# Patient Record
Sex: FEMALE | Race: WHITE | Employment: OTHER | URBAN - NONMETROPOLITAN AREA
[De-identification: names, ages, dates, MRNs, and addresses within clinical notes are randomized per-mention and may not be internally consistent; named-entity substitution may affect disease eponyms.]

---

## 2021-03-21 NOTE — PROGRESS NOTES
[facility] from 7 AM to 5:30 PM                         Past Medical History:   Diagnosis Date    Hypertension        No past surgical history on file. No Known Allergies    Current Outpatient Medications   Medication Sig Dispense Refill    Multiple Vitamins-Minerals (MULTIVITAMIN ADULT PO) Take by mouth daily       No current facility-administered medications for this visit. Family History   Problem Relation Age of Onset    High Cholesterol Mother     Cancer Father     Heart Disease Father     Heart Disease Maternal Grandfather     High Blood Pressure Maternal Grandfather     Diabetes Paternal Grandfather     Heart Disease Paternal Grandfather     High Blood Pressure Paternal Grandfather         Review of Systems:   General/Constitutional: No recent loss of weight or appetite changes. No fever or chills. HENT: Negative. Eyes: Negative. Upper respiratory tract: No nasal stuffiness or post nasal drip. Lower respiratory tract/ lungs: No cough or sputum production. No hemoptysis. Cardiovascular: No palpitations or chest pain. Gastrointestinal: No nausea or vomiting. Neurological: No focal neurologiacal weakness. Extremities: No edema. Musculoskeletal: No complaints. Genitourinary: No complaints. Hematological: Negative. Psychiatric/Behavioral: Negative. Skin: No itching. /80 (Site: Left Lower Arm, Position: Sitting, Cuff Size: Large Adult)   Pulse 86   Temp 97.4 °F (36.3 °C) (Tympanic)   Ht 5' 5\" (1.651 m)   Wt (!) 407 lb 3.2 oz (184.7 kg)   SpO2 91% Comment: room air  BMI 67.76 kg/m²   Mallampati airway Class:4  Neck Circumference: 17. 50Inches  [place] sleepiness score 4/1/21: 3  Sleep Apnea Quality of Life Questionnaire 65      Physical Exam   Nursing note and vitals reviewed. Constitutional: Patient appears well built and well nourished. No distress. Patient is oriented to person, place, and time. HENT:   Head: Normocephalic and atraumatic.    Right Ear: External ear normal.   Left Ear: External ear normal.   Mouth/Throat: Oropharynx is clear and moist.  No oral thrush. Eyes: Conjunctivae are normal. Pupils are equal, round, and reactive to light. No scleral icterus. Neck: Neck supple. No JVD present. No tracheal deviation present. Cardiovascular: Normal rate, regular rhythm, normal heart sounds. No murmur heard. Pulmonary/Chest: Effort normal and breath sounds normal. No stridor. No respiratory distress. No wheezes. No rales. Patient exhibits no tenderness. Abdominal: Soft. Obese. Patient exhibits no distension. No tenderness. Musculoskeletal: Normal range of motion. Extremities: Patient exhibits no edema and no tenderness. Lymphadenopathy:  No cervical adenopathy. Neurological: Patient is alert and oriented to person, place, and time. Skin: Skin is warm and dry. Patient is not diaphoretic. Psychiatric: Patient  has a normal mood and affect. Patient behavior is normal.     Diagnostic Data:  None related sleep. Assessment:  -Snoring without witnessed apneas,frequent nocturnal awakenings and excessive daytime sleepiness to evaluate for obstructive sleep apnea. -Inadequate sleep hygiene.  -Pre operative evaluation for planned bariatric surgery.  -Polycystic ovarian syndrome. She used to be on treatment with Metformin in the past.  She quit taking Metformin      Recommendations/Plan:  -Will schedule patient for polysomnogram in the sleep lab. -I had a discussion with patient regarding avialable treatment options for her sleep disorder breathing including but not limited to CPAP titration in the sleep lab Vs.Dental appliance placement with referral to a local dentist Vs other available surgical options including Uvulopalatopharyngoplasty, maxillomandibular ostomy and tracheostomy as last option.  At the end of discussion, she is not decided on her   treatment if she found to have obstructive sleep apnea at this time.  -We will see Genoveva Johnson Shields back in 1week after the sleep study to go over the sleep study results and further management options.  -She was educated to practice good sleep hygiene practices. She was provided with a good sleep hygiene hand out.  -Abagail Kocher was advised to make earlier appointment with my clinic if she develops any worsening of sleep symptoms. She verbalizes understanding.  -Shewas advised to loose weight by controlling diet and doing exercise. She is currently following with [facility] weight management center with Dr. Dolores Winters was educated about my impression and plan. She verbalizes understanding.      -I personally reviewed updated the Past medical hx, Past surgical hx,Social hx, Family hx, Medications, Allergies in the discrete data section of the patient chart along with labs, Pulmonary medicine,Sleep medicine related, Pathological, Microbiological and Radiological investigations.

## 2021-04-01 ENCOUNTER — INITIAL CONSULT (OUTPATIENT)
Dept: PULMONOLOGY | Age: 36
End: 2021-04-01
Payer: COMMERCIAL

## 2021-04-01 VITALS
TEMPERATURE: 97.4 F | HEART RATE: 86 BPM | DIASTOLIC BLOOD PRESSURE: 80 MMHG | OXYGEN SATURATION: 91 % | WEIGHT: 293 LBS | HEIGHT: 65 IN | BODY MASS INDEX: 48.82 KG/M2 | SYSTOLIC BLOOD PRESSURE: 138 MMHG

## 2021-04-01 DIAGNOSIS — Z01.818 PRE-OP EVALUATION: ICD-10-CM

## 2021-04-01 DIAGNOSIS — G47.30 SLEEP APNEA, UNSPECIFIED TYPE: ICD-10-CM

## 2021-04-01 DIAGNOSIS — R06.83 SNORING: Primary | ICD-10-CM

## 2021-04-01 PROCEDURE — 99204 OFFICE O/P NEW MOD 45 MIN: CPT | Performed by: INTERNAL MEDICINE

## 2021-04-01 NOTE — PROGRESS NOTES
Chief Complaint: Consult,ref by Wayne No prior studies per patient    Mallampati airway Class:4  Neck Circumference: 17. 50Inches    [place] sleepiness score 4/1/21: 3  Sleep Apnea Quality of Life Questionnaire 65

## 2021-05-05 DIAGNOSIS — R06.83 SNORING: ICD-10-CM

## 2021-05-05 DIAGNOSIS — Z01.818 PRE-OP EVALUATION: ICD-10-CM

## 2021-05-05 DIAGNOSIS — G47.30 SLEEP APNEA, UNSPECIFIED TYPE: ICD-10-CM

## 2021-11-21 ENCOUNTER — APPOINTMENT (OUTPATIENT)
Dept: CT IMAGING | Age: 36
End: 2021-11-21
Payer: COMMERCIAL

## 2021-11-21 ENCOUNTER — HOSPITAL ENCOUNTER (EMERGENCY)
Age: 36
Discharge: ANOTHER HOSPITAL | End: 2021-11-22
Attending: EMERGENCY MEDICINE
Payer: COMMERCIAL

## 2021-11-21 ENCOUNTER — APPOINTMENT (OUTPATIENT)
Dept: GENERAL RADIOLOGY | Age: 36
End: 2021-11-21
Payer: COMMERCIAL

## 2021-11-21 DIAGNOSIS — J12.82 PNEUMONIA DUE TO COVID-19 VIRUS: Primary | ICD-10-CM

## 2021-11-21 DIAGNOSIS — R09.02 HYPOXIA: ICD-10-CM

## 2021-11-21 DIAGNOSIS — U07.1 PNEUMONIA DUE TO COVID-19 VIRUS: Primary | ICD-10-CM

## 2021-11-21 LAB
ALBUMIN SERPL-MCNC: 3.9 GM/DL (ref 3.4–5)
ALP BLD-CCNC: 96 IU/L (ref 40–129)
ALT SERPL-CCNC: 33 U/L (ref 10–40)
ANION GAP SERPL CALCULATED.3IONS-SCNC: 16 MMOL/L (ref 4–16)
AST SERPL-CCNC: 33 IU/L (ref 15–37)
BASOPHILS ABSOLUTE: 0 K/CU MM
BASOPHILS RELATIVE PERCENT: 0.4 % (ref 0–1)
BILIRUB SERPL-MCNC: 0.5 MG/DL (ref 0–1)
BUN BLDV-MCNC: 15 MG/DL (ref 6–23)
CALCIUM SERPL-MCNC: 8.2 MG/DL (ref 8.3–10.6)
CHLORIDE BLD-SCNC: 97 MMOL/L (ref 99–110)
CO2: 22 MMOL/L (ref 21–32)
CREAT SERPL-MCNC: 0.8 MG/DL (ref 0.6–1.1)
D DIMER: 767 NG/ML(DDU)
DIFFERENTIAL TYPE: ABNORMAL
EKG ATRIAL RATE: 86 BPM
EKG DIAGNOSIS: NORMAL
EKG P AXIS: 35 DEGREES
EKG P-R INTERVAL: 168 MS
EKG Q-T INTERVAL: 382 MS
EKG QRS DURATION: 108 MS
EKG QTC CALCULATION (BAZETT): 457 MS
EKG R AXIS: -3 DEGREES
EKG T AXIS: 23 DEGREES
EKG VENTRICULAR RATE: 86 BPM
EOSINOPHILS ABSOLUTE: 0 K/CU MM
EOSINOPHILS RELATIVE PERCENT: 0.1 % (ref 0–3)
GFR AFRICAN AMERICAN: >60 ML/MIN/1.73M2
GFR NON-AFRICAN AMERICAN: >60 ML/MIN/1.73M2
GLUCOSE BLD-MCNC: 109 MG/DL (ref 70–99)
HCG QUALITATIVE: NEGATIVE
HCT VFR BLD CALC: 50.7 % (ref 37–47)
HEMOGLOBIN: 15.9 GM/DL (ref 12.5–16)
IMMATURE NEUTROPHIL %: 0.8 % (ref 0–0.43)
LYMPHOCYTES ABSOLUTE: 2.5 K/CU MM
LYMPHOCYTES RELATIVE PERCENT: 31.1 % (ref 24–44)
MCH RBC QN AUTO: 28.5 PG (ref 27–31)
MCHC RBC AUTO-ENTMCNC: 31.4 % (ref 32–36)
MCV RBC AUTO: 91 FL (ref 78–100)
MONOCYTES ABSOLUTE: 0.5 K/CU MM
MONOCYTES RELATIVE PERCENT: 6.6 % (ref 0–4)
PDW BLD-RTO: 14.9 % (ref 11.7–14.9)
PLATELET # BLD: 233 K/CU MM (ref 140–440)
PMV BLD AUTO: 10 FL (ref 7.5–11.1)
POTASSIUM SERPL-SCNC: 3.9 MMOL/L (ref 3.5–5.1)
PRO-BNP: 73.91 PG/ML
RBC # BLD: 5.57 M/CU MM (ref 4.2–5.4)
SEGMENTED NEUTROPHILS ABSOLUTE COUNT: 4.8 K/CU MM
SEGMENTED NEUTROPHILS RELATIVE PERCENT: 61 % (ref 36–66)
SODIUM BLD-SCNC: 135 MMOL/L (ref 135–145)
TOTAL IMMATURE NEUTOROPHIL: 0.06 K/CU MM
TOTAL PROTEIN: 7.4 GM/DL (ref 6.4–8.2)
TROPONIN T: <0.01 NG/ML
WBC # BLD: 7.9 K/CU MM (ref 4–10.5)

## 2021-11-21 PROCEDURE — 87205 SMEAR GRAM STAIN: CPT

## 2021-11-21 PROCEDURE — 84703 CHORIONIC GONADOTROPIN ASSAY: CPT

## 2021-11-21 PROCEDURE — 85025 COMPLETE CBC W/AUTO DIFF WBC: CPT

## 2021-11-21 PROCEDURE — 6360000004 HC RX CONTRAST MEDICATION: Performed by: EMERGENCY MEDICINE

## 2021-11-21 PROCEDURE — 83615 LACTATE (LD) (LDH) ENZYME: CPT

## 2021-11-21 PROCEDURE — 2580000003 HC RX 258: Performed by: NURSE PRACTITIONER

## 2021-11-21 PROCEDURE — 86141 C-REACTIVE PROTEIN HS: CPT

## 2021-11-21 PROCEDURE — 71275 CT ANGIOGRAPHY CHEST: CPT

## 2021-11-21 PROCEDURE — 86140 C-REACTIVE PROTEIN: CPT

## 2021-11-21 PROCEDURE — 84484 ASSAY OF TROPONIN QUANT: CPT

## 2021-11-21 PROCEDURE — 87449 NOS EACH ORGANISM AG IA: CPT

## 2021-11-21 PROCEDURE — 87899 AGENT NOS ASSAY W/OPTIC: CPT

## 2021-11-21 PROCEDURE — 6360000002 HC RX W HCPCS: Performed by: EMERGENCY MEDICINE

## 2021-11-21 PROCEDURE — 6360000002 HC RX W HCPCS: Performed by: NURSE PRACTITIONER

## 2021-11-21 PROCEDURE — 85379 FIBRIN DEGRADATION QUANT: CPT

## 2021-11-21 PROCEDURE — 93005 ELECTROCARDIOGRAM TRACING: CPT | Performed by: EMERGENCY MEDICINE

## 2021-11-21 PROCEDURE — 80053 COMPREHEN METABOLIC PANEL: CPT

## 2021-11-21 PROCEDURE — 99285 EMERGENCY DEPT VISIT HI MDM: CPT

## 2021-11-21 PROCEDURE — 96375 TX/PRO/DX INJ NEW DRUG ADDON: CPT

## 2021-11-21 PROCEDURE — 83880 ASSAY OF NATRIURETIC PEPTIDE: CPT

## 2021-11-21 PROCEDURE — 71045 X-RAY EXAM CHEST 1 VIEW: CPT

## 2021-11-21 PROCEDURE — 96376 TX/PRO/DX INJ SAME DRUG ADON: CPT

## 2021-11-21 PROCEDURE — 93010 ELECTROCARDIOGRAM REPORT: CPT | Performed by: INTERNAL MEDICINE

## 2021-11-21 PROCEDURE — 82728 ASSAY OF FERRITIN: CPT

## 2021-11-21 PROCEDURE — 96367 TX/PROPH/DG ADDL SEQ IV INF: CPT

## 2021-11-21 PROCEDURE — 96365 THER/PROPH/DIAG IV INF INIT: CPT

## 2021-11-21 PROCEDURE — 96372 THER/PROPH/DIAG INJ SC/IM: CPT

## 2021-11-21 RX ORDER — DEXAMETHASONE SODIUM PHOSPHATE 10 MG/ML
10 INJECTION, SOLUTION INTRAMUSCULAR; INTRAVENOUS ONCE
Status: COMPLETED | OUTPATIENT
Start: 2021-11-21 | End: 2021-11-21

## 2021-11-21 RX ORDER — ACETAMINOPHEN 325 MG/1
650 TABLET ORAL EVERY 6 HOURS PRN
Status: DISCONTINUED | OUTPATIENT
Start: 2021-11-21 | End: 2021-11-22 | Stop reason: HOSPADM

## 2021-11-21 RX ORDER — ACETAMINOPHEN 650 MG/1
650 SUPPOSITORY RECTAL EVERY 6 HOURS PRN
Status: DISCONTINUED | OUTPATIENT
Start: 2021-11-21 | End: 2021-11-22 | Stop reason: HOSPADM

## 2021-11-21 RX ADMIN — AZITHROMYCIN 500 MG: 500 INJECTION, POWDER, LYOPHILIZED, FOR SOLUTION INTRAVENOUS at 18:19

## 2021-11-21 RX ADMIN — DEXAMETHASONE SODIUM PHOSPHATE 10 MG: 10 INJECTION, SOLUTION INTRAMUSCULAR; INTRAVENOUS at 14:36

## 2021-11-21 RX ADMIN — DEXAMETHASONE SODIUM PHOSPHATE 10 MG: 10 INJECTION, SOLUTION INTRAMUSCULAR; INTRAVENOUS at 17:25

## 2021-11-21 RX ADMIN — CEFTRIAXONE SODIUM 1000 MG: 1 INJECTION, POWDER, FOR SOLUTION INTRAMUSCULAR; INTRAVENOUS at 17:43

## 2021-11-21 RX ADMIN — IOPAMIDOL 100 ML: 755 INJECTION, SOLUTION INTRAVENOUS at 14:23

## 2021-11-21 ASSESSMENT — ENCOUNTER SYMPTOMS
EYE REDNESS: 0
RHINORRHEA: 0
BACK PAIN: 0
VOMITING: 0
ABDOMINAL PAIN: 0
SHORTNESS OF BREATH: 1
COUGH: 1
NAUSEA: 0
SORE THROAT: 0
WHEEZING: 0

## 2021-11-21 NOTE — CONSULTS
History   Problem Relation Age of Onset    High Cholesterol Mother      Cancer Father      Heart Disease Father      Heart Disease Maternal Grandfather      High Blood Pressure Maternal Grandfather      Diabetes Paternal Grandfather      Heart Disease Paternal Grandfather      High Blood Pressure Paternal Grandfather         Objective:   No intake or output data in the 24 hours ending 11/21/21 1540   Vitals:   Vitals:    11/21/21 1236   BP: (!) 145/84   Pulse: 99   Resp: 19   Temp: 99.4 °F (37.4 °C)   SpO2: 95%     Physical Exam:   GEN Awake female, sitting upright in bed in no apparent distress. Appears given age. EYES Pupils are equally round. No scleral erythema, discharge, or conjunctivitis. HENT Mucous membranes are moist. Oral pharynx without exudates, no evidence of thrush. NECK Supple, no apparent thyromegaly or masses. RESP diminished breath sounds bilaterally  CARDIO/VASC S1/S2 auscultated. Regular rate without appreciable murmurs, rubs, or gallops. No JVD or carotid bruits. Peripheral pulses equal bilaterally and palpable. No peripheral edema. GI Abdomen is soft without significant tenderness, masses, or guarding. Bowel sounds are normoactive.  No costovertebral angle tenderness  MSK No gross joint deformities. SKIN Normal coloration, warm, dry. NEURO Cranial nerves appear grossly intact, normal speech, no lateralizing weakness. PSYCH Awake, alert, oriented x 4. Affect appropriate.     Medications:   Medications:    dexamethasone  20 mg IntraVENous Daily      Infusions:   PRN Meds:        Electronically signed by TRICIA Pereira NP on 11/21/2021 at 3:40 PM

## 2021-11-21 NOTE — ED PROVIDER NOTES
Triage Chief Complaint:   Shortness of Breath (sent from urgent care today for low oxygen saturation. Symptoms started wednesday  with fever chills and SOB. )    Knik:  Faith Sharp is a 31 y.o. female that presents from urgent care after she tested positive for Covid there and was found to be hypoxic. Patient states her symptoms started on Wednesday with body aches and a headache and loss of taste and smell. She did have some coughing over the last 2 days and last night she had one episode of blood-tinged mucus. She felt short of breath with the cough. She has had occasional fevers. No chest pain. No nausea or vomiting. She denies any significant past medical history denies being on any medications daily. No history of blood clots. No lower extremity swelling. ROS:   Review of Systems   Constitutional: Positive for chills, fatigue and fever. HENT: Negative for congestion, rhinorrhea and sore throat. Eyes: Negative for redness and visual disturbance. Respiratory: Positive for cough and shortness of breath (with coughing). Negative for wheezing. Cardiovascular: Negative for chest pain and leg swelling. Gastrointestinal: Negative for abdominal pain, nausea and vomiting. Genitourinary: Negative for dysuria and frequency. Musculoskeletal: Positive for myalgias (generalized). Negative for arthralgias and back pain. Skin: Negative for rash and wound. Neurological: Positive for headaches. Negative for dizziness, syncope and light-headedness. Psychiatric/Behavioral: Negative. Negative for hallucinations and suicidal ideas. Past Medical History:   Diagnosis Date    Hypertension      No past surgical history on file.   Family History   Problem Relation Age of Onset    High Cholesterol Mother     Cancer Father     Heart Disease Father     Heart Disease Maternal Grandfather     High Blood Pressure Maternal Grandfather     Diabetes Paternal Grandfather     Heart Disease Paternal Grandfather     High Blood Pressure Paternal Grandfather      Social History     Socioeconomic History    Marital status:      Spouse name: Not on file    Number of children: Not on file    Years of education: Not on file    Highest education level: Not on file   Occupational History    Not on file   Tobacco Use    Smoking status: Former Smoker     Quit date: 2013     Years since quittin.4    Smokeless tobacco: Never Used   Substance and Sexual Activity    Alcohol use: No     Comment: ocassionally     Drug use: Not on file    Sexual activity: Not on file   Other Topics Concern    Not on file   Social History Narrative    Not on file     Social Determinants of Health     Financial Resource Strain:     Difficulty of Paying Living Expenses: Not on file   Food Insecurity:     Worried About [address] in the Last Year: Not on file    [address] [place] in the Last Year: Not on file   Transportation Needs:     Lack of Transportation (Medical): Not on file    Lack of Transportation (Non-Medical):  Not on file   Physical Activity:     Days of Exercise per Week: Not on file    Minutes of Exercise per Session: Not on file   Stress:     Feeling of Stress : Not on file   Social Connections:     Frequency of Communication with Friends and Family: Not on file    Frequency of Social Gatherings with Friends and Family: Not on file    Attends Anabaptism Services: Not on file    Active Member of [address] [place] [address] or Organizations: Not on file    Attends Club or Organization Meetings: Not on file    Marital Status: Not on file   Intimate Partner Violence:     Fear of Current or Ex-Partner: Not on file    Emotionally Abused: Not on file    Physically Abused: Not on file    Sexually Abused: Not on file   Housing Stability:     Unable to Pay for Housing in the Last Year: Not on file    Number of [place] in the Last Year: Not on file    Unstable Housing in the Last Year: Not on file     No current facility-administered medications for this encounter. No current outpatient medications on file.      Facility-Administered Medications Ordered in Other Encounters   Medication Dose Route Frequency Provider Last Rate Last Admin    sodium chloride flush 0.9 % injection 5-40 mL  5-40 mL IntraVENous 2 times per day Phoebe Rim, APRN - CNP   10 mL at 11/22/21 1229    sodium chloride flush 0.9 % injection 5-40 mL  5-40 mL IntraVENous PRN Phoebe Rim, APRN - CNP        0.9 % sodium chloride infusion  25 mL IntraVENous PRN Phoebe Rim, APRN - CNP        enoxaparin (LOVENOX) injection 30 mg  30 mg SubCUTAneous BID Phoebe Rim, APRN - CNP   30 mg at 11/22/21 1133    ondansetron (ZOFRAN-ODT) disintegrating tablet 4 mg  4 mg Oral Q8H PRN Phoebe Rim, APRN - CNP        Or    ondansetron (ZOFRAN) injection 4 mg  4 mg IntraVENous Q6H PRN Phoebe Rim, APRN - CNP        polyethylene glycol (GLYCOLAX) packet 17 g  17 g Oral Daily PRN Phoeeb Rim, APRN - CNP        acetaminophen (TYLENOL) tablet 650 mg  650 mg Oral Q6H PRN Phoebe Rim, APRN - CNP        Or    acetaminophen (TYLENOL) suppository 650 mg  650 mg Rectal Q6H PRN Phoebe Rim, APRN - CNP        ascorbic acid (VITAMIN C) tablet 500 mg  500 mg Oral Daily Phoebe Rim, APRN - CNP   500 mg at 11/22/21 1133    guaiFENesin-dextromethorphan (ROBITUSSIN DM) 100-10 MG/5ML syrup 5 mL  5 mL Oral Q4H PRN Phoebe Rim, APRN - CNP        dexamethasone (DECADRON) tablet 6 mg  6 mg Oral Daily Phoebe Rim, APRN - CNP   6 mg at 11/22/21 1133    vitamin D CAPS 2,000 Units  2,000 Units Oral Daily Phoebe Rim, APRN - CNP   2,000 Units at 11/22/21 1133    baricitinib (OLUMIANT) tablet 4 mg  4 mg Oral Daily Phoebe Rim, APRN - CNP   4 mg at 11/22/21 1228    albuterol sulfate  (90 Base) MCG/ACT inhaler 2 puff  2 puff Inhalation Q6H PRN Phoebe Rim, APRN - CNP         No Known Allergies    Nursing Notes Reviewed Pneumoniae Antigen    Specimen: CSF   Result Value Ref Range    Strep pneumo Ag URINE NEGATIVE    CBC Auto Differential   Result Value Ref Range    WBC 7.9 4.0 - 10.5 K/CU MM    RBC 5.57 (H) 4.2 - 5.4 M/CU MM    Hemoglobin 15.9 12.5 - 16.0 GM/DL    Hematocrit 50.7 (H) 37 - 47 %    MCV 91.0 78 - 100 FL    MCH 28.5 27 - 31 PG    MCHC 31.4 (L) 32.0 - 36.0 %    RDW 14.9 11.7 - 14.9 %    Platelets 918 228 - 940 K/CU MM    MPV 10.0 7.5 - 11.1 FL    Differential Type AUTOMATED DIFFERENTIAL     Segs Relative 61.0 36 - 66 %    Lymphocytes % 31.1 24 - 44 %    Monocytes % 6.6 (H) 0 - 4 %    Eosinophils % 0.1 0 - 3 %    Basophils % 0.4 0 - 1 %    Segs Absolute 4.8 K/CU MM    Lymphocytes Absolute 2.5 K/CU MM    Monocytes Absolute 0.5 K/CU MM    Eosinophils Absolute 0.0 K/CU MM    Basophils Absolute 0.0 K/CU MM    Immature Neutrophil % 0.8 (H) 0 - 0.43 %    Total Immature Neutrophil 0.06 K/CU MM   Comprehensive Metabolic Panel w/ Reflex to MG   Result Value Ref Range    Sodium 135 135 - 145 MMOL/L    Potassium 3.9 3.5 - 5.1 MMOL/L    Chloride 97 (L) 99 - 110 mMol/L    CO2 22 21 - 32 MMOL/L    BUN 15 6 - 23 MG/DL    CREATININE 0.8 0.6 - 1.1 MG/DL    Glucose 109 (H) 70 - 99 MG/DL    Calcium 8.2 (L) 8.3 - 10.6 MG/DL    Albumin 3.9 3.4 - 5.0 GM/DL    Total Protein 7.4 6.4 - 8.2 GM/DL    Total Bilirubin 0.5 0.0 - 1.0 MG/DL    ALT 33 10 - 40 U/L    AST 33 15 - 37 IU/L    Alkaline Phosphatase 96 40 - 129 IU/L    GFR Non-African American >60 >60 mL/min/1.73m2    GFR African American >60 >60 mL/min/1.73m2    Anion Gap 16 4 - 16   Troponin   Result Value Ref Range    Troponin T <0.010 <0.01 NG/ML   Brain Natriuretic Peptide   Result Value Ref Range    Pro-BNP 73.91 <300 PG/ML   HCG Serum, Qualitative   Result Value Ref Range    hCG Qual NEGATIVE    C-reactive protein   Result Value Ref Range    CRP, High Sensitivity 215.2 mg/L   Lactate dehydrogenase   Result Value Ref Range     (H) 120 - 246 IU/L   D-dimer, quantitative   Result Value Ref Range    D-Dimer, Quant 767 (H) <230 NG/mL(DDU)   High sensitivity CRP   Result Value Ref Range    CRP High Sensitivity 150.2 (H) <5.0 mg/L   Ferritin   Result Value Ref Range    Ferritin 347 (H) 15 - 150 NG/ML   EKG 12 Lead   Result Value Ref Range    Ventricular Rate 86 BPM    Atrial Rate 86 BPM    P-R Interval 168 ms    QRS Duration 108 ms    Q-T Interval 382 ms    QTc Calculation (Bazett) 457 ms    P Axis 35 degrees    R Axis -3 degrees    T Axis 23 degrees    Diagnosis       Normal sinus rhythm  Possible Left atrial enlargement  Incomplete right bundle branch block  Left ventricular hypertrophy  Abnormal ECG  No previous ECGs available  Confirmed by [facility] Ronal GARCÍA ([id number]) on 11/21/2021 6:35:05 PM        Radiographs (if obtained):  [] The following radiograph was interpreted by myself in the absence of a radiologist:  [x]Radiologist's Report Reviewed:  CTA PULMONARY W CONTRAST   Final Result   1. No pulmonary embolism. 2. Diffuse patchy bilateral consolidative opacities compatible with   multifocal pneumonia. 3. Enlarged main pulmonary artery as can be seen with pulmonary hypertension. XR CHEST PORTABLE   Final Result   Mild pulmonary edema. EKG (if obtained): (All EKG's are interpreted by myself in the absence of a cardiologist)  Normal sinus rhythm with a rate of 86. NC interval 168, , QTc 457. No ST elevations or depressions. Normal T waves. Incomplete right bundle branch block. Left ventricular hypertrophy. Impression: Abnormal EKG. No previous EKGs for comparison. MDM:  Differential diagnoses considered include but are not limited to COVID-19, Covid pneumonia, bacterial pneumonia, pulmonary edema, pulmonary embolism. Given patient's positive Covid test and hypoxia suspect her symptoms are due to COVID-19 pneumonia. Basic labs were obtained and are unremarkable. Chest x-ray shows mild pulmonary edema.   A CT scan of her chest was obtained which shows no pulmonary embolism but diffuse patchy bilateral consolidative opacities compatible with multifocal pneumonia. There is also an enlarged pulmonary artery which could be seen with pulmonary hypertension. Patient continues to require at least 2 L of oxygen at rest to maintain oxygen saturations above 90%. Due to this plan admitted to the hospital for further evaluation and treatment. Patient given a dose of Decadron in the emergency department. Patient will need to be transferred to [facility] given her positive Covid status. Plan of care explained to patient. All questions and concerns were addressed to the patient's satisfaction. Patient understood and agreed with plan. I spoke with Dr. Judi Herr who accepted pt in transfer. I did don appropriate PPE (including face mask, protective eye ware/safety glasses and gloves), as recommended by the health facility/national standard best practice, during my bedside interactions with the patient. The likelihood of other entities in the differential is insufficient to justify any further testing for them. This was explained to the patient. The patient was advised that persistent or worsening symptoms would requirefurther evaluation. Clinical Impression:  1. Pneumonia due to COVID-19 virus    2. Hypoxia          Kirk Stiles MD       Please note that portions of this note may have been complete with a voice recognition program.  Effortswere made to edit the dictations, but occasional words are mis-transcribed.   \       Kirk Stiles MD  11/22/21 4426

## 2021-11-21 NOTE — ED TRIAGE NOTES
Pt ambulatory into ED bed 5. Pt went to urgent care today and she was sent directly to [facility] ED. Pt sats were 72% on RA on arrival. Pt placed on 2 L NC upon arrival and sats are currently 96% at rest. Pt denies cp.

## 2021-11-22 ENCOUNTER — HOSPITAL ENCOUNTER (INPATIENT)
Age: 36
LOS: 5 days | Discharge: HOME | DRG: 177 | End: 2021-11-27
Attending: FAMILY MEDICINE | Admitting: FAMILY MEDICINE
Payer: COMMERCIAL

## 2021-11-22 VITALS
RESPIRATION RATE: 20 BRPM | DIASTOLIC BLOOD PRESSURE: 86 MMHG | HEART RATE: 85 BPM | OXYGEN SATURATION: 92 % | SYSTOLIC BLOOD PRESSURE: 135 MMHG | TEMPERATURE: 98.2 F

## 2021-11-22 LAB
APTT: 34.5 SECONDS (ref 25.1–37.1)
C-REACTIVE PROTEIN, HIGH SENSITIVITY: 150.2 MG/L
D DIMER: 720 NG/ML(DDU)
FERRITIN: 347 NG/ML (ref 15–150)
HIGH SENSITIVE C-REACTIVE PROTEIN: 148.1 MG/L
HIGH SENSITIVE C-REACTIVE PROTEIN: 215.2 MG/L
INR BLD: 1.03 INDEX
LACTATE DEHYDROGENASE: 313 IU/L (ref 120–246)
LACTATE DEHYDROGENASE: 341 IU/L (ref 120–246)
LEGIONELLA URINARY AG: NEGATIVE
PROCALCITONIN: 0.04
PROTHROMBIN TIME: 13.3 SECONDS (ref 11.7–14.5)
STREP PNEUMONIAE ANTIGEN: NORMAL
VITAMIN D 25-HYDROXY: 11.61 NG/ML

## 2021-11-22 PROCEDURE — 85730 THROMBOPLASTIN TIME PARTIAL: CPT

## 2021-11-22 PROCEDURE — 85379 FIBRIN DEGRADATION QUANT: CPT

## 2021-11-22 PROCEDURE — 2060000000 HC ICU INTERMEDIATE R&B

## 2021-11-22 PROCEDURE — 6370000000 HC RX 637 (ALT 250 FOR IP): Performed by: NURSE PRACTITIONER

## 2021-11-22 PROCEDURE — 1200000000 HC SEMI PRIVATE

## 2021-11-22 PROCEDURE — 84145 PROCALCITONIN (PCT): CPT

## 2021-11-22 PROCEDURE — 82306 VITAMIN D 25 HYDROXY: CPT

## 2021-11-22 PROCEDURE — 6360000002 HC RX W HCPCS: Performed by: NURSE PRACTITIONER

## 2021-11-22 PROCEDURE — 86141 C-REACTIVE PROTEIN HS: CPT

## 2021-11-22 PROCEDURE — 2700000000 HC OXYGEN THERAPY PER DAY

## 2021-11-22 PROCEDURE — 36415 COLL VENOUS BLD VENIPUNCTURE: CPT

## 2021-11-22 PROCEDURE — 94761 N-INVAS EAR/PLS OXIMETRY MLT: CPT

## 2021-11-22 PROCEDURE — 83615 LACTATE (LD) (LDH) ENZYME: CPT

## 2021-11-22 PROCEDURE — 85610 PROTHROMBIN TIME: CPT

## 2021-11-22 PROCEDURE — 2580000003 HC RX 258: Performed by: NURSE PRACTITIONER

## 2021-11-22 PROCEDURE — 94660 CPAP INITIATION&MGMT: CPT

## 2021-11-22 PROCEDURE — 80053 COMPREHEN METABOLIC PANEL: CPT

## 2021-11-22 RX ORDER — SODIUM CHLORIDE 0.9 % (FLUSH) 0.9 %
5-40 SYRINGE (ML) INJECTION PRN
Status: DISCONTINUED | OUTPATIENT
Start: 2021-11-22 | End: 2021-11-27 | Stop reason: HOSPADM

## 2021-11-22 RX ORDER — ACETAMINOPHEN 650 MG/1
650 SUPPOSITORY RECTAL EVERY 6 HOURS PRN
Status: DISCONTINUED | OUTPATIENT
Start: 2021-11-22 | End: 2021-11-27 | Stop reason: HOSPADM

## 2021-11-22 RX ORDER — DEXAMETHASONE 4 MG/1
6 TABLET ORAL DAILY
Status: DISCONTINUED | OUTPATIENT
Start: 2021-11-22 | End: 2021-11-27 | Stop reason: HOSPADM

## 2021-11-22 RX ORDER — ASCORBIC ACID 500 MG
500 TABLET ORAL DAILY
Status: DISCONTINUED | OUTPATIENT
Start: 2021-11-22 | End: 2021-11-27 | Stop reason: HOSPADM

## 2021-11-22 RX ORDER — ACETAMINOPHEN 325 MG/1
650 TABLET ORAL EVERY 6 HOURS PRN
Status: DISCONTINUED | OUTPATIENT
Start: 2021-11-22 | End: 2021-11-27 | Stop reason: HOSPADM

## 2021-11-22 RX ORDER — SODIUM CHLORIDE 0.9 % (FLUSH) 0.9 %
5-40 SYRINGE (ML) INJECTION EVERY 12 HOURS SCHEDULED
Status: DISCONTINUED | OUTPATIENT
Start: 2021-11-22 | End: 2021-11-27 | Stop reason: HOSPADM

## 2021-11-22 RX ORDER — POLYETHYLENE GLYCOL 3350 17 G/17G
17 POWDER, FOR SOLUTION ORAL DAILY PRN
Status: DISCONTINUED | OUTPATIENT
Start: 2021-11-22 | End: 2021-11-27 | Stop reason: HOSPADM

## 2021-11-22 RX ORDER — ALBUTEROL SULFATE 90 UG/1
2 AEROSOL, METERED RESPIRATORY (INHALATION) EVERY 6 HOURS PRN
Status: DISCONTINUED | OUTPATIENT
Start: 2021-11-22 | End: 2021-11-27 | Stop reason: HOSPADM

## 2021-11-22 RX ORDER — GUAIFENESIN/DEXTROMETHORPHAN 100-10MG/5
5 SYRUP ORAL EVERY 4 HOURS PRN
Status: DISCONTINUED | OUTPATIENT
Start: 2021-11-22 | End: 2021-11-27 | Stop reason: HOSPADM

## 2021-11-22 RX ORDER — ONDANSETRON 4 MG/1
4 TABLET, ORALLY DISINTEGRATING ORAL EVERY 8 HOURS PRN
Status: DISCONTINUED | OUTPATIENT
Start: 2021-11-22 | End: 2021-11-27 | Stop reason: HOSPADM

## 2021-11-22 RX ORDER — ONDANSETRON 2 MG/ML
4 INJECTION INTRAMUSCULAR; INTRAVENOUS EVERY 6 HOURS PRN
Status: DISCONTINUED | OUTPATIENT
Start: 2021-11-22 | End: 2021-11-27 | Stop reason: HOSPADM

## 2021-11-22 RX ORDER — SODIUM CHLORIDE 9 MG/ML
25 INJECTION, SOLUTION INTRAVENOUS PRN
Status: DISCONTINUED | OUTPATIENT
Start: 2021-11-22 | End: 2021-11-27 | Stop reason: HOSPADM

## 2021-11-22 RX ADMIN — DEXAMETHASONE 6 MG: 4 TABLET ORAL at 11:33

## 2021-11-22 RX ADMIN — OXYCODONE HYDROCHLORIDE AND ACETAMINOPHEN 500 MG: 500 TABLET ORAL at 11:33

## 2021-11-22 RX ADMIN — SODIUM CHLORIDE, PRESERVATIVE FREE 10 ML: 5 INJECTION INTRAVENOUS at 12:29

## 2021-11-22 RX ADMIN — Medication 2000 UNITS: at 11:33

## 2021-11-22 RX ADMIN — ENOXAPARIN SODIUM 30 MG: 100 INJECTION SUBCUTANEOUS at 00:56

## 2021-11-22 RX ADMIN — SODIUM CHLORIDE, PRESERVATIVE FREE 10 ML: 5 INJECTION INTRAVENOUS at 21:32

## 2021-11-22 RX ADMIN — ENOXAPARIN SODIUM 30 MG: 100 INJECTION SUBCUTANEOUS at 21:31

## 2021-11-22 RX ADMIN — ENOXAPARIN SODIUM 30 MG: 100 INJECTION SUBCUTANEOUS at 11:33

## 2021-11-22 RX ADMIN — BARICITINIB 4 MG: 2 TABLET, FILM COATED ORAL at 12:28

## 2021-11-22 ASSESSMENT — ENCOUNTER SYMPTOMS
VOMITING: 0
COUGH: 1
PHOTOPHOBIA: 0
SHORTNESS OF BREATH: 1
EYES NEGATIVE: 1
CHEST TIGHTNESS: 0
ABDOMINAL PAIN: 0
NAUSEA: 0
DIARRHEA: 0
SINUS PRESSURE: 0
WHEEZING: 0

## 2021-11-22 NOTE — ED NOTES
Frozen dinner, sherbert, blane mist and water served. States she has not eaten dinner. Sitting at the side of bed to eat. Enc activity as tolerated.        Kayleen Encinas RN  11/21/21 1584

## 2021-11-22 NOTE — ED NOTES
Transferred to [address] [place] [address] 2013 in stable condition by [facility].        Ashvin Araya RN  11/22/21 0208

## 2021-11-22 NOTE — ED NOTES
Patient placed on bipap on the following settings: IPAP 16 , EPAP 8, set RR 14 and 02 at 5lpm entrained to maintain Sp02 91-95%. VS HR 81, RR 17.  Patient uses bipap at home during sleep and related she is comfortable on mask. RN [employer] aware and RT follow.

## 2021-11-22 NOTE — CARE COORDINATION
.CM called pt's spouse to initiate a safe discharge plan. Cm introduced self and explained role of CM. Pt is kind, alert and oriented. Pt lives at home in a one floor home with spouse. Pt has a PCP. Pt has transportation for appointments and groceries. Pt does not have insurance but  is working with his employer to help. CM discussed Med Assist if needed. . CM called Otilia Vaz with the financial counselor to check also on insurance. Discharge plan is pending pts needs but at this time pt will return home with her . She is also loved and supported by her parents. Pt has a PCP. Poss need for Med assist. No insur listed. .  CM provided card and encouraged to call for any needs or concern. CM is available if any needs arise.

## 2021-11-22 NOTE — CONSULTS
Pharmacy Consult for Baricitinib Initiation per Trace Seth NP    Criteria per [facility] P&T Committee  **All criteria need to be met to receive   Baricitinib for COVID-19 patients**   Age    >5years old     Yes   Laboratory Results    Confirmed positive COVID-19     PLUS    ANY elevation in biomarkers   (CRP, D-dimer, LDH, ferritin)       Yes  D-Dimer   Concomitant Therapy    Receiving systemic steroids for treatment of COVID 19     Yes   Oxygen Status        Requiring supplemental oxygen   (>1 L NC, HFNC, Heated Vapotherm, biPAP, mechanical ventilation)        Yes   Special Considerations    Pregnancy  Severe Hepatic Impairment  Chronic/Recurrent Infections   Hemoglobin <8         Clarify risk vs. benefit with provider if criteria met     Contraindications    1. Invasive active mycobacterial or fungal infection  2. Lymphocyte count <200  3. Neutrophil count, ANC <500   4. Significant immunosuppression    ? None     Dosing Recommendations:    Baricitinib 4 mg PO daily x 14 days (or until hospital discharge)    Renal dose adjustment:  -eGFR > 60: no adjustment necessary       Thank you for the consult,  Supriya Santiago.  Kattie Kayser  11/22/2021 @ 11:27 AM

## 2021-11-22 NOTE — H&P
History and Physical      Name:  Eliseo Cabral /Age/Sex: 1985  (31 y.o. female)   MRN & CSN:  [id number] & [id number] Admission Date/Time: 2021  9:02 AM   Location:  6602/7327-F PCP: Jennifer Mojica Day: 1    Assessment and Plan:   Eliseo Cabral is a 31 y.o.  female  who presents with shortness of breath     Assessment and plan:     Acute respiratory failure with hypoxia 2/2 COVID viral pneumonia -CTA chest-no pulmonary embolism. Diffuse patchy bilateral consultive opacities compatible with multifocal pneumonia. Enlarged main pulmonary artery. In the Er patient was treated with Azithromycin and Rocephin. Decadron 20 mg IVPB. DDimer [phone],   -Admit to Med/Surg  -Pending screening labs  -Consult to Pharmacy for treatment plan  -Pending cultures  -Bronchodilators   -Pulmonary hygiene  - Decadron 6 mg daily  -Vitamin C 500 mg daily PO  -Vit D level  -Vitamin D daily PO  -respiratory per protocol  -Continuous pulse ox  -Strep pneumo. and legionella pending  -Robitussin DM    Obstructive sleep apnea: CPAP at night. CTA demonstrated pulmonary artery measurement of 4.5 cm seen in Pulmonary hypertension   -continue CPAP    Super Morbid obesity BMI: 65.45 kg/m2 Life style modifications    Disposition: Inpaitent. Patient was accepted for continue evaluation and treatment and improvement of clinical symptoms. Discussed assessment and treatment plan with the admitting and supervising physician who agrees with the plan. Diet ADULT DIET; Regular   DVT Prophylaxis [x] Lovenox, []  Heparin, [] SCDs, [] Warfarin  [] NOAC     GI Prophylaxis [] PPI,  [] H2 Blocker,  [] Carafate,  [] Diet/Tube Feeds   Code Status Full Code   ADM: mother Kate Pineda and Spouse Wilian     History of Present Illness:     Chief Complaint: shortness of breath, fever, chills, myalgias.    Eliseo Cabral is a 31 y.o.  female, with past medical history significant for sleep apnea, morbid obesity, who presented to the emergency room with complaint of fever, chills, shortness of breath, myalgias. The present condition started 5 days ago on Wednesday she reports she started with fever, chills, myalgias, cough at night states that dinner she had a change in her taste. Patient states she did not receive her Covid vaccine or a flu vaccine. 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 Patient denies any nausea, vomiting, abdominal pain, diarrhea. Denies any urinary symptoms, dysuria urgency or frequency. Patient denies any chest pain or palpitations. Patient states she does have a history of sleep apnea and uses CPAP at night. Patient initially presented urgent care and was found to be hypoxic with saturations 80-86% per what patient was told in  and was sent to the ER, her Covid test was positive. Upon arrival to ER O2 sats were 72%. Patient was seen at [facility] and accepted to transfer to [facility]. For further evaluation and treatment. Vital signs upon admission were temperature 97.8, blood pressure 122/103, respirations 19, pulse 79, 94% on high flow nasal cannula. The patient was brought to the ED and was subsequently admitted for  further evaluation. and management     Review of Systems   Constitutional: Positive for appetite change, chills and fever. Altered taste   HENT: Negative for congestion and sinus pressure. Eyes: Negative. Negative for photophobia and visual disturbance. Respiratory: Positive for cough and shortness of breath. Negative for chest tightness and wheezing. Cardiovascular: Negative for chest pain and palpitations. Gastrointestinal: Negative for abdominal pain, diarrhea, nausea and vomiting. Endocrine: Negative. Genitourinary: Negative for dysuria, frequency and urgency. Musculoskeletal: Positive for myalgias. Skin: Negative. Neurological: Positive for headaches.  Negative for weakness and light-headedness. Psychiatric/Behavioral: Negative. Objective:   No intake or output data in the 24 hours ending 11/22/21 1051   Vitals:   Vitals:    11/22/21 0903   BP: (!) 122/103   Pulse: 79   Resp: 19   Temp: 97.8 °F (36.6 °C)   SpO2: 94%     Physical Exam:   GEN- Awake female, NAD, sitting up in bed, appears to be stated age. EYES- Pupils are equally round. No scleral erythema, discharge, or conjunctivitis. HENT- Mucous membranes are moist. Oral pharynx without exudates, no evidence of thrush. NECK- Supple, no apparent thyromegaly or masses. RESP-Clear to auscultation, no wheezes, rales or rhonchi. Symmetric chest movement while on room air. CARDIO/VASC-  S1/S2 auscultated. Regular rate and rhythm, No JVD. Peripheral pulses equal bilaterally and palpable. GI- Abdomen is soft, obese, no tenderness, masses, or guarding. Bowel sounds present. MSK- No gross joint deformities. EXTREMITIES- pulses intact, no swelling, no calf tenderness  SKIN- Normal coloration, warm, dry. NEURO- Cranial nerves appear grossly intact, normal speech, no lateralizing weakness. PSYCH- Awake, alert, oriented x 4. Past Medical History:      Past Medical History:   Diagnosis Date    Hypertension      PSHX:  has no past surgical history on file. Allergies: No Known Allergies    FAM HX: family history includes Cancer in her father; Diabetes in her paternal grandfather; Heart Disease in her father, maternal grandfather, and paternal grandfather; High Blood Pressure in her maternal grandfather and paternal grandfather; High Cholesterol in her mother.   Soc HX:   Social History     Socioeconomic History    Marital status:      Spouse name: Not on file    Number of children: Not on file    Years of education: Not on file    Highest education level: Not on file   Occupational History    Not on file   Tobacco Use    Smoking status: Former Smoker     Quit date: 5/30/2013     Years since quittin.4    Smokeless tobacco: Never Used   Substance and Sexual Activity    Alcohol use: No     Comment: ocassionally     Drug use: Not on file    Sexual activity: Not on file   Other Topics Concern    Not on file   Social History Narrative    Not on file     Social and family history reviewed with patient. Medications:     Home Medication   Prior to Admission medications    Medication Sig Start Date End Date Taking? Authorizing Provider   Multiple Vitamins-Minerals (MULTIVITAMIN ADULT PO) Take by mouth daily    Historical Provider, MD     Medications:    sodium chloride flush  5-40 mL IntraVENous 2 times per day    enoxaparin  30 mg SubCUTAneous BID    ascorbic acid  500 mg Oral Daily    dexamethasone  6 mg Oral Daily    Vitamin D  2,000 Units Oral Daily      Infusions:    sodium chloride       PRN Meds: sodium chloride flush, 5-40 mL, PRN  sodium chloride, 25 mL, PRN  ondansetron, 4 mg, Q8H PRN   Or  ondansetron, 4 mg, Q6H PRN  polyethylene glycol, 17 g, Daily PRN  acetaminophen, 650 mg, Q6H PRN   Or  acetaminophen, 650 mg, Q6H PRN  guaiFENesin-dextromethorphan, 5 mL, Q4H PRN        Recent Labs     21  1230   WBC 7.9   HGB 15.9   HCT 50.7*         Recent Labs     21  1230      K 3.9   CL 97*   CO2 22   BUN 15   CREATININE 0.8     Recent Labs     21  1230   AST 33   ALT 33   BILITOT 0.5   ALKPHOS 96     No results for input(s): INR in the last 72 hours. Recent Labs     21  1230   TROPONINT <0.010        Imaging reviewed  XR CHEST PORTABLE    Result Date: 2021  EXAMINATION: ONE XRAY VIEW OF THE CHEST 2021 12:33 pm COMPARISON: None. HISTORY: ORDERING SYSTEM PROVIDED HISTORY: shortness of breath TECHNOLOGIST PROVIDED HISTORY: Reason for exam:->shortness of breath Reason for Exam: Shortness of breath FINDINGS: Cardiomediastinal silhouette is within normal limits. Bilateral pulmonary vascular congestion and mild interstitial edema.   No pleural effusion or pneumothorax. No gross bony abnormality. Mild pulmonary edema. CTA PULMONARY W CONTRAST    Result Date: 11/21/2021  EXAMINATION: CTA OF THE CHEST 11/21/2021 1:56 pm TECHNIQUE: CTA of the chest was performed after the administration of intravenous contrast.  Multiplanar reformatted images are provided for review. MIP images are provided for review. Dose modulation, iterative reconstruction, and/or weight based adjustment of the mA/kV was utilized to reduce the radiation dose to as low as reasonably achievable. COMPARISON: Chest radiograph 11/21/2021. HISTORY: ORDERING SYSTEM PROVIDED HISTORY: hypoxia TECHNOLOGIST PROVIDED HISTORY: Reason for exam:->hypoxia Decision Support Exception - unselect if not a suspected or confirmed emergency medical condition->Emergency Medical Condition (MA) Reason for Exam: Hypoxia FINDINGS: Pulmonary Arteries: The main pulmonary artery is enlarged measuring up to approximately 4.5 cm in diameter. The pulmonary arteries are adequately opacified for evaluation. Limited evaluation of the subsegmental pulmonary artery branches secondary to the patient's body habitus. No pulmonary embolism identified. Mediastinum: The thoracic aorta is normal in appearance. The coronary arteries and branch vessels of the superior mediastinum and lower neck are unremarkable. The heart is upper limits of normal in size. No pericardial effusion. The mediastinal esophagus and visualized aspects of the thyroid gland are unremarkable. No mediastinal or hilar lymphadenopathy. Lungs/pleura: The central airways are patent. No pleural effusion or pneumothorax. There are diffuse patchy bilateral consolidative opacities most pronounced in the upper lobes. No interlobular septal thickening. Upper Abdomen: Limited images of the upper abdomen are unremarkable. Soft Tissues/Bones: No acute bone or soft tissue abnormality. 1. No pulmonary embolism.  2. Diffuse patchy bilateral consolidative opacities compatible with multifocal pneumonia. 3. Enlarged main pulmonary artery as can be seen with pulmonary hypertension.           Electronically signed by TRICIA Long CNP on 11/22/2021 at 10:51 AM

## 2021-11-22 NOTE — ED NOTES
Report given to [facility].       Felix Mike RN  11/22/21 [address] Siddhartha Maria M Aguillon RN  11/22/21 6416

## 2021-11-22 NOTE — ED NOTES
Symptoms started Wednesday (4 days ago). Was sick last month with with URI but tested Negative for covid then. Today is the first covid test she has received since Wednesday.  + HA, body aches, fever, can't smell or taste anything. SOB started last night with cough. Productive cough and states she has had blood in it. Typically sleeps in a recliner. Has been able to walk to BR at home. Has increased fatigue, slept for 2 days straight.        Reji Ozuna, GONZALES  11/21/21 2041

## 2021-11-22 NOTE — ED NOTES
Bipap changed to V-60 and settings adjusted to IPAP 17, EPAP 8 , set RR 15 and Fi02 55%  to maintain Sp02 in the 90's. VS HR 64, RR17 and Sp02 98%. Noted patient does have apneas during sleep and she does use bipap at home with 02 entrained. Patient relates she is currently comfortable and respirations are non-labored. RN aware and RT will follow.

## 2021-11-22 NOTE — ED NOTES
Sitting at the side of bed drinking fluids. Diaphoretic and states she has been that way since she got here.        Ashley Chery RN  11/21/21 3999

## 2021-11-23 LAB
ALBUMIN SERPL-MCNC: 3.9 GM/DL (ref 3.4–5)
ALP BLD-CCNC: 85 IU/L (ref 40–128)
ALT SERPL-CCNC: 34 U/L (ref 10–40)
ANION GAP SERPL CALCULATED.3IONS-SCNC: 11 MMOL/L (ref 4–16)
AST SERPL-CCNC: 39 IU/L (ref 15–37)
BASOPHILS ABSOLUTE: 0 K/CU MM
BASOPHILS RELATIVE PERCENT: 0.1 % (ref 0–1)
BILIRUB SERPL-MCNC: 0.5 MG/DL (ref 0–1)
BUN BLDV-MCNC: 23 MG/DL (ref 6–23)
CALCIUM SERPL-MCNC: 8.9 MG/DL (ref 8.3–10.6)
CHLORIDE BLD-SCNC: 101 MMOL/L (ref 99–110)
CO2: 29 MMOL/L (ref 21–32)
CREAT SERPL-MCNC: 0.7 MG/DL (ref 0.6–1.1)
D DIMER: 632 NG/ML(DDU)
DIFFERENTIAL TYPE: ABNORMAL
EOSINOPHILS ABSOLUTE: 0 K/CU MM
EOSINOPHILS RELATIVE PERCENT: 0 % (ref 0–3)
GFR AFRICAN AMERICAN: >60 ML/MIN/1.73M2
GFR NON-AFRICAN AMERICAN: >60 ML/MIN/1.73M2
GLUCOSE BLD-MCNC: 192 MG/DL (ref 70–99)
HCT VFR BLD CALC: 49.8 % (ref 37–47)
HEMOGLOBIN: 15.2 GM/DL (ref 12.5–16)
HIGH SENSITIVE C-REACTIVE PROTEIN: 63.4 MG/L
IMMATURE NEUTROPHIL %: 0.7 % (ref 0–0.43)
LACTATE: 1.8 MMOL/L (ref 0.4–2)
LYMPHOCYTES ABSOLUTE: 1.5 K/CU MM
LYMPHOCYTES RELATIVE PERCENT: 15 % (ref 24–44)
MCH RBC QN AUTO: 28.5 PG (ref 27–31)
MCHC RBC AUTO-ENTMCNC: 30.5 % (ref 32–36)
MCV RBC AUTO: 93.4 FL (ref 78–100)
MONOCYTES ABSOLUTE: 0.6 K/CU MM
MONOCYTES RELATIVE PERCENT: 5.3 % (ref 0–4)
NUCLEATED RBC %: 0 %
PDW BLD-RTO: 14.6 % (ref 11.7–14.9)
PLATELET # BLD: 289 K/CU MM (ref 140–440)
PMV BLD AUTO: 9.7 FL (ref 7.5–11.1)
POTASSIUM SERPL-SCNC: 4.4 MMOL/L (ref 3.5–5.1)
RBC # BLD: 5.33 M/CU MM (ref 4.2–5.4)
SEGMENTED NEUTROPHILS ABSOLUTE COUNT: 8.1 K/CU MM
SEGMENTED NEUTROPHILS RELATIVE PERCENT: 78.9 % (ref 36–66)
SODIUM BLD-SCNC: 141 MMOL/L (ref 135–145)
TOTAL IMMATURE NEUTOROPHIL: 0.07 K/CU MM
TOTAL NUCLEATED RBC: 0 K/CU MM
TOTAL PROTEIN: 7.2 GM/DL (ref 6.4–8.2)
TROPONIN T: <0.01 NG/ML
WBC # BLD: 10.3 K/CU MM (ref 4–10.5)

## 2021-11-23 PROCEDURE — 6370000000 HC RX 637 (ALT 250 FOR IP): Performed by: FAMILY MEDICINE

## 2021-11-23 PROCEDURE — XW0DXM6 INTRODUCTION OF BARICITINIB INTO MOUTH AND PHARYNX, EXTERNAL APPROACH, NEW TECHNOLOGY GROUP 6: ICD-10-PCS | Performed by: FAMILY MEDICINE

## 2021-11-23 PROCEDURE — 84484 ASSAY OF TROPONIN QUANT: CPT

## 2021-11-23 PROCEDURE — 2060000000 HC ICU INTERMEDIATE R&B

## 2021-11-23 PROCEDURE — 36415 COLL VENOUS BLD VENIPUNCTURE: CPT

## 2021-11-23 PROCEDURE — 80053 COMPREHEN METABOLIC PANEL: CPT

## 2021-11-23 PROCEDURE — 2580000003 HC RX 258: Performed by: NURSE PRACTITIONER

## 2021-11-23 PROCEDURE — 1200000000 HC SEMI PRIVATE

## 2021-11-23 PROCEDURE — 83605 ASSAY OF LACTIC ACID: CPT

## 2021-11-23 PROCEDURE — 86141 C-REACTIVE PROTEIN HS: CPT

## 2021-11-23 PROCEDURE — 6370000000 HC RX 637 (ALT 250 FOR IP): Performed by: NURSE PRACTITIONER

## 2021-11-23 PROCEDURE — 94761 N-INVAS EAR/PLS OXIMETRY MLT: CPT

## 2021-11-23 PROCEDURE — 85379 FIBRIN DEGRADATION QUANT: CPT

## 2021-11-23 PROCEDURE — 6360000002 HC RX W HCPCS: Performed by: NURSE PRACTITIONER

## 2021-11-23 PROCEDURE — 2700000000 HC OXYGEN THERAPY PER DAY

## 2021-11-23 PROCEDURE — 85025 COMPLETE CBC W/AUTO DIFF WBC: CPT

## 2021-11-23 RX ADMIN — SODIUM CHLORIDE, PRESERVATIVE FREE 10 ML: 5 INJECTION INTRAVENOUS at 08:57

## 2021-11-23 RX ADMIN — SODIUM CHLORIDE, PRESERVATIVE FREE 10 ML: 5 INJECTION INTRAVENOUS at 21:49

## 2021-11-23 RX ADMIN — BARICITINIB 4 MG: 2 TABLET, FILM COATED ORAL at 08:57

## 2021-11-23 RX ADMIN — DEXAMETHASONE 6 MG: 4 TABLET ORAL at 08:57

## 2021-11-23 RX ADMIN — Medication 2000 UNITS: at 08:56

## 2021-11-23 RX ADMIN — ENOXAPARIN SODIUM 30 MG: 100 INJECTION SUBCUTANEOUS at 21:48

## 2021-11-23 RX ADMIN — OXYCODONE HYDROCHLORIDE AND ACETAMINOPHEN 500 MG: 500 TABLET ORAL at 08:56

## 2021-11-23 RX ADMIN — ENOXAPARIN SODIUM 30 MG: 100 INJECTION SUBCUTANEOUS at 08:57

## 2021-11-23 NOTE — PROGRESS NOTES
Hospitalist Progress Note      Name:  Silvana Izaguirre /Age/Sex: 1985  (31 y.o. female)   MRN & CSN:  [id number] & [id number] Admission Date/Time: 2021  9:02 AM   Location:  [address] [place] [address]-A PCP: Jodee Vigil Day: 2    Assessment and Plan:   Silvana Izaguirre is a 31 y.o.  female  who presents with <principal problem not specified>    1. PNEUMONIA DUE TO COVID-19  -Currently stable, on Olumiant/dexa day 2. Will continue for now    2. ACUTE RESPIRATORY FAILURE WITH HYPOXIA  -sec to above. Still on 7L will continue and wean as tolerated    3. OBSTRUCTIVE SLEEP APNEA  -Stable, slept last night. Based on what patient described she is likely on BiPAP at home. Suggested she could have home machine brought in but is currently stable without it    4. ENLARGED PULMONARY ARTERY  -Incidental finding on CT. I explained it is likely secondary to TENNILLE and obesity and suggested she can follow up with PCP as an outpatient to continue to follow    5. MORBID OBESITY      Diet ADULT DIET; Regular   DVT Prophylaxis [x] Lovenox, []  Heparin, [] SCDs, [] Ambulation   GI Prophylaxis [] PPI,  [] H2 Blocker,  [] Carafate,  [] Diet/Tube Feeds   Code Status Full Code   Disposition Patient requires continued admission due to covid   MDM [] Low, [] Moderate,[]  High  Patient's risk as above due to covid     History of Present Illness:     Chief Complaint: <principal problem not specified>  Silvana Izaguirre is a 31 y.o.  female  who presents with SOB    No reported issues overnight. She was able to sleep well last night. No SOB or other complaints. Appetite appropriate. No other complaints       14 point ROS reviewed negative, unless as noted above    Objective:        Intake/Output Summary (Last 24 hours) at 2021 1201  Last data filed at 2021 2119  Gross per 24 hour   Intake 120 ml   Output --   Net 120 ml      Vitals:   Vitals:    21 0855   BP:    Pulse:    Resp:    Temp: 97.4 °F (36.3 °C)   SpO2: Physical Exam:   GEN Awake female, sitting on side of bed in no apparent distress. Appears given age. EYES Pupils are equally round. No scleral erythema, discharge, or conjunctivitis. HENT Mucous membranes are moist. Oral pharynx without exudates, no evidence of thrush. NECK Supple, no apparent thyromegaly or masses. RESP Clear to auscultation, no wheezes, rales or rhonchi. Symmetric chest movement. CARDIO/VASC S1/S2 auscultated. Regular rate without appreciable murmurs, rubs, or gallops. No JVD or carotid bruits. Peripheral pulses equal bilaterally and palpable. No peripheral edema. GI Abdomen is soft without significant tenderness, masses, or guarding. Bowel sounds are normoactive. Rectal exam deferred. HEME/LYMPH No palpable cervical lymphadenopathy and no hepatosplenomegaly. No petechiae or ecchymoses. MSK No gross joint deformities. SKIN Normal coloration, warm, dry. NEURO Cranial nerves appear grossly intact, normal speech, no lateralizing weakness. PSYCH Awake, alert, oriented x 4. Affect appropriate.     Medications:   Medications:    sodium chloride flush  5-40 mL IntraVENous 2 times per day    enoxaparin  30 mg SubCUTAneous BID    ascorbic acid  500 mg Oral Daily    dexamethasone  6 mg Oral Daily    vitamin D  2,000 Units Oral Daily    baricitinib  4 mg Oral Daily      Infusions:    sodium chloride       PRN Meds: sodium chloride flush, 5-40 mL, PRN  sodium chloride, 25 mL, PRN  ondansetron, 4 mg, Q8H PRN   Or  ondansetron, 4 mg, Q6H PRN  polyethylene glycol, 17 g, Daily PRN  acetaminophen, 650 mg, Q6H PRN   Or  acetaminophen, 650 mg, Q6H PRN  guaiFENesin-dextromethorphan, 5 mL, Q4H PRN  albuterol sulfate HFA, 2 puff, Q6H PRN

## 2021-11-23 NOTE — CARE COORDINATION
Reviewing pt's chart and noticed pt does not have Insurance and is positive for COVID. Per Ashley Desai CM at [facility], \"Pt does not have insurance but  is working with his employer to help. CM discussed Med Assist if needed. . CM called Fanny Keith with the financial counselor to check also on insurance. Poss need for Med Assist.\" Pt is being added to our referral list if she would need assistance. If a voucher is created at d/c, she will then be put on the flag list. If she would require any further assistance, she would have to fill out an application and required documents. Noni Epstein

## 2021-11-24 LAB
ALBUMIN SERPL-MCNC: 3.6 GM/DL (ref 3.4–5)
ALP BLD-CCNC: 71 IU/L (ref 40–128)
ALT SERPL-CCNC: 53 U/L (ref 10–40)
ANION GAP SERPL CALCULATED.3IONS-SCNC: 15 MMOL/L (ref 4–16)
AST SERPL-CCNC: 50 IU/L (ref 15–37)
BASOPHILS ABSOLUTE: 0 K/CU MM
BASOPHILS RELATIVE PERCENT: 0.1 % (ref 0–1)
BILIRUB SERPL-MCNC: 0.5 MG/DL (ref 0–1)
BUN BLDV-MCNC: 23 MG/DL (ref 6–23)
CALCIUM SERPL-MCNC: 8.4 MG/DL (ref 8.3–10.6)
CHLORIDE BLD-SCNC: 103 MMOL/L (ref 99–110)
CO2: 26 MMOL/L (ref 21–32)
CREAT SERPL-MCNC: 0.6 MG/DL (ref 0.6–1.1)
D DIMER: 617 NG/ML(DDU)
DIFFERENTIAL TYPE: ABNORMAL
EOSINOPHILS ABSOLUTE: 0 K/CU MM
EOSINOPHILS RELATIVE PERCENT: 0 % (ref 0–3)
GFR AFRICAN AMERICAN: >60 ML/MIN/1.73M2
GFR NON-AFRICAN AMERICAN: >60 ML/MIN/1.73M2
GLUCOSE BLD-MCNC: 233 MG/DL (ref 70–99)
HCT VFR BLD CALC: 46.7 % (ref 37–47)
HEMOGLOBIN: 14.3 GM/DL (ref 12.5–16)
HIGH SENSITIVE C-REACTIVE PROTEIN: 38.1 MG/L
IMMATURE NEUTROPHIL %: 0.9 % (ref 0–0.43)
LYMPHOCYTES ABSOLUTE: 2 K/CU MM
LYMPHOCYTES RELATIVE PERCENT: 25.1 % (ref 24–44)
MCH RBC QN AUTO: 28.7 PG (ref 27–31)
MCHC RBC AUTO-ENTMCNC: 30.6 % (ref 32–36)
MCV RBC AUTO: 93.8 FL (ref 78–100)
MONOCYTES ABSOLUTE: 0.4 K/CU MM
MONOCYTES RELATIVE PERCENT: 4.7 % (ref 0–4)
NUCLEATED RBC %: 0 %
PDW BLD-RTO: 14.7 % (ref 11.7–14.9)
PLATELET # BLD: 260 K/CU MM (ref 140–440)
PMV BLD AUTO: 9.3 FL (ref 7.5–11.1)
POTASSIUM SERPL-SCNC: 4 MMOL/L (ref 3.5–5.1)
RBC # BLD: 4.98 M/CU MM (ref 4.2–5.4)
SEGMENTED NEUTROPHILS ABSOLUTE COUNT: 5.4 K/CU MM
SEGMENTED NEUTROPHILS RELATIVE PERCENT: 69.2 % (ref 36–66)
SODIUM BLD-SCNC: 144 MMOL/L (ref 135–145)
TOTAL IMMATURE NEUTOROPHIL: 0.07 K/CU MM
TOTAL NUCLEATED RBC: 0 K/CU MM
TOTAL PROTEIN: 6.3 GM/DL (ref 6.4–8.2)
WBC # BLD: 7.8 K/CU MM (ref 4–10.5)

## 2021-11-24 PROCEDURE — 6370000000 HC RX 637 (ALT 250 FOR IP): Performed by: NURSE PRACTITIONER

## 2021-11-24 PROCEDURE — 36415 COLL VENOUS BLD VENIPUNCTURE: CPT

## 2021-11-24 PROCEDURE — 94761 N-INVAS EAR/PLS OXIMETRY MLT: CPT

## 2021-11-24 PROCEDURE — 86141 C-REACTIVE PROTEIN HS: CPT

## 2021-11-24 PROCEDURE — 2700000000 HC OXYGEN THERAPY PER DAY

## 2021-11-24 PROCEDURE — 85379 FIBRIN DEGRADATION QUANT: CPT

## 2021-11-24 PROCEDURE — 80053 COMPREHEN METABOLIC PANEL: CPT

## 2021-11-24 PROCEDURE — 2580000003 HC RX 258: Performed by: NURSE PRACTITIONER

## 2021-11-24 PROCEDURE — 6370000000 HC RX 637 (ALT 250 FOR IP): Performed by: FAMILY MEDICINE

## 2021-11-24 PROCEDURE — 85025 COMPLETE CBC W/AUTO DIFF WBC: CPT

## 2021-11-24 PROCEDURE — 1200000000 HC SEMI PRIVATE

## 2021-11-24 PROCEDURE — 84145 PROCALCITONIN (PCT): CPT

## 2021-11-24 PROCEDURE — 6360000002 HC RX W HCPCS: Performed by: NURSE PRACTITIONER

## 2021-11-24 RX ADMIN — BARICITINIB 4 MG: 2 TABLET, FILM COATED ORAL at 08:20

## 2021-11-24 RX ADMIN — Medication 2000 UNITS: at 08:21

## 2021-11-24 RX ADMIN — DEXAMETHASONE 6 MG: 4 TABLET ORAL at 08:21

## 2021-11-24 RX ADMIN — ENOXAPARIN SODIUM 30 MG: 100 INJECTION SUBCUTANEOUS at 21:39

## 2021-11-24 RX ADMIN — ENOXAPARIN SODIUM 30 MG: 100 INJECTION SUBCUTANEOUS at 08:22

## 2021-11-24 RX ADMIN — OXYCODONE HYDROCHLORIDE AND ACETAMINOPHEN 500 MG: 500 TABLET ORAL at 08:21

## 2021-11-24 RX ADMIN — SODIUM CHLORIDE, PRESERVATIVE FREE 10 ML: 5 INJECTION INTRAVENOUS at 08:22

## 2021-11-24 RX ADMIN — SODIUM CHLORIDE, PRESERVATIVE FREE 10 ML: 5 INJECTION INTRAVENOUS at 21:39

## 2021-11-24 NOTE — FLOWSHEET NOTE
Initial pastoral care visit. Patient was hoping to be home on thanksgiving and realize that is probably not going to happen. Patient was sad and little tearful during the visit. This  offered active listening, emotional support through encouragements and prayer support. Patient appreciated the visit.

## 2021-11-24 NOTE — PROGRESS NOTES
Hospitalist Progress Note      Name:  Eric Damon /Age/Sex: 1985  (31 y.o. female)   MRN & CSN:  [id number] & [id number] Admission Date/Time: 2021  9:02 AM   Location:  [address] [place] [address]-H PCP: Lilly Quinn Day: 3    Assessment and Plan:   Eric Damon is a 31 y.o.  female  who presents with <principal problem not specified>    1. PNEUMONIA DUE TO COVID-19  -Currently stable, she is feeling better. On Olumiant/dexa day 2. Will continue for now     2. ACUTE RESPIRATORY FAILURE WITH HYPOXIA  -sec to above. Currently on 6L will continue and wean as tolerated     3. OBSTRUCTIVE SLEEP APNEA  -Stable, slept last night. I encouraged patient to have her  bring in machine     4. ENLARGED PULMONARY ARTERY  -Incidental finding on CT. Will follow up with PCP    5. MORBID OBESITY          Diet ADULT DIET; Regular   DVT Prophylaxis [x] Lovenox, []  Heparin, [] SCDs, [] Ambulation   GI Prophylaxis [] PPI,  [] H2 Blocker,  [] Carafate,  [] Diet/Tube Feeds   Code Status Full Code   Disposition Patient requires continued admission due to covid   MDM [] Low, [] Moderate,[]  High  Patient's risk as above due to covid     History of Present Illness:     Chief Complaint: <principal problem not specified>  Eric Damon is a 31 y.o.  female  who presents with SOB    No issues overnight. She is feeling better today. Was able to sleep and is going to toilet without issues       14 point ROS reviewed negative, unless as noted above    Objective:   No intake or output data in the 24 hours ending 21 1357   Vitals:   Vitals:    21 0811   BP:    Pulse:    Resp:    Temp:    SpO2: 93%     Physical Exam:   GEN Awake female, sitting upright side of bed in no apparent distress. Appears given age. EYES Pupils are equally round. No scleral erythema, discharge, or conjunctivitis. HENT Mucous membranes are moist. Oral pharynx without exudates, no evidence of thrush.   NECK Supple, no apparent thyromegaly or masses. RESP Clear to auscultation, no wheezes, rales or rhonchi. Symmetric chest movement . CARDIO/VASC S1/S2 auscultated. Regular rate without appreciable murmurs, rubs, or gallops. No JVD or carotid bruits. Peripheral pulses equal bilaterally and palpable. No peripheral edema. GI Abdomen is soft without significant tenderness, masses, or guarding. Bowel sounds are normoactive. Rectal exam deferred. HEME/LYMPH No palpable cervical lymphadenopathy and no hepatosplenomegaly. No petechiae or ecchymoses. MSK No gross joint deformities. SKIN Normal coloration, warm, dry. NEURO Cranial nerves appear grossly intact, normal speech, no lateralizing weakness. PSYCH Awake, alert, oriented x 4. Affect appropriate.     Medications:   Medications:    sodium chloride flush  5-40 mL IntraVENous 2 times per day    enoxaparin  30 mg SubCUTAneous BID    ascorbic acid  500 mg Oral Daily    dexamethasone  6 mg Oral Daily    vitamin D  2,000 Units Oral Daily    baricitinib  4 mg Oral Daily      Infusions:    sodium chloride       PRN Meds: sodium chloride flush, 5-40 mL, PRN  sodium chloride, 25 mL, PRN  ondansetron, 4 mg, Q8H PRN   Or  ondansetron, 4 mg, Q6H PRN  polyethylene glycol, 17 g, Daily PRN  acetaminophen, 650 mg, Q6H PRN   Or  acetaminophen, 650 mg, Q6H PRN  guaiFENesin-dextromethorphan, 5 mL, Q4H PRN  albuterol sulfate HFA, 2 puff, Q6H PRN

## 2021-11-25 LAB
ESTIMATED AVERAGE GLUCOSE: 143 MG/DL
GLUCOSE BLD-MCNC: 102 MG/DL (ref 70–99)
GLUCOSE BLD-MCNC: 119 MG/DL (ref 70–99)
GLUCOSE BLD-MCNC: 123 MG/DL (ref 70–99)
GLUCOSE BLD-MCNC: 99 MG/DL (ref 70–99)
HBA1C MFR BLD: 6.6 % (ref 4.2–6.3)
PROCALCITONIN: 0.02

## 2021-11-25 PROCEDURE — 2700000000 HC OXYGEN THERAPY PER DAY

## 2021-11-25 PROCEDURE — 1200000000 HC SEMI PRIVATE

## 2021-11-25 PROCEDURE — 82962 GLUCOSE BLOOD TEST: CPT

## 2021-11-25 PROCEDURE — 83036 HEMOGLOBIN GLYCOSYLATED A1C: CPT

## 2021-11-25 PROCEDURE — 6360000002 HC RX W HCPCS: Performed by: NURSE PRACTITIONER

## 2021-11-25 PROCEDURE — 36415 COLL VENOUS BLD VENIPUNCTURE: CPT

## 2021-11-25 PROCEDURE — 6370000000 HC RX 637 (ALT 250 FOR IP): Performed by: FAMILY MEDICINE

## 2021-11-25 PROCEDURE — 6370000000 HC RX 637 (ALT 250 FOR IP): Performed by: NURSE PRACTITIONER

## 2021-11-25 PROCEDURE — 2580000003 HC RX 258: Performed by: NURSE PRACTITIONER

## 2021-11-25 PROCEDURE — 94761 N-INVAS EAR/PLS OXIMETRY MLT: CPT

## 2021-11-25 RX ORDER — NICOTINE POLACRILEX 4 MG
15 LOZENGE BUCCAL PRN
Status: DISCONTINUED | OUTPATIENT
Start: 2021-11-25 | End: 2021-11-27 | Stop reason: HOSPADM

## 2021-11-25 RX ORDER — DEXTROSE MONOHYDRATE 25 G/50ML
12.5 INJECTION, SOLUTION INTRAVENOUS PRN
Status: DISCONTINUED | OUTPATIENT
Start: 2021-11-25 | End: 2021-11-27 | Stop reason: HOSPADM

## 2021-11-25 RX ORDER — DEXTROSE MONOHYDRATE 50 MG/ML
100 INJECTION, SOLUTION INTRAVENOUS PRN
Status: DISCONTINUED | OUTPATIENT
Start: 2021-11-25 | End: 2021-11-27 | Stop reason: HOSPADM

## 2021-11-25 RX ADMIN — Medication 2000 UNITS: at 10:03

## 2021-11-25 RX ADMIN — SODIUM CHLORIDE, PRESERVATIVE FREE 10 ML: 5 INJECTION INTRAVENOUS at 21:05

## 2021-11-25 RX ADMIN — ENOXAPARIN SODIUM 30 MG: 100 INJECTION SUBCUTANEOUS at 10:03

## 2021-11-25 RX ADMIN — OXYCODONE HYDROCHLORIDE AND ACETAMINOPHEN 500 MG: 500 TABLET ORAL at 10:03

## 2021-11-25 RX ADMIN — DEXAMETHASONE 6 MG: 4 TABLET ORAL at 10:02

## 2021-11-25 RX ADMIN — BARICITINIB 4 MG: 2 TABLET, FILM COATED ORAL at 10:03

## 2021-11-25 RX ADMIN — ENOXAPARIN SODIUM 30 MG: 100 INJECTION SUBCUTANEOUS at 21:03

## 2021-11-25 RX ADMIN — SODIUM CHLORIDE, PRESERVATIVE FREE 10 ML: 5 INJECTION INTRAVENOUS at 10:03

## 2021-11-25 NOTE — PROGRESS NOTES
11/25/21 0943   Oxygen Therapy/Pulse Ox   O2 Therapy Oxygen   $Oxygen $Daily Charge   O2 Device High flow nasal cannula   O2 Flow Rate (L/min) 4 L/min   SpO2 96 %   Pulse Oximeter Device Mode Continuous   Pulse Oximeter Device Location Finger   $Pulse Oximeter $Spot check (multiple/continuous)   Blood Gas  Performed?  No

## 2021-11-25 NOTE — PROGRESS NOTES
Hospitalist Progress Note      Name:  Eric Damon /Age/Sex: 1985  (31 y.o. female)   MRN & CSN:  [id number] & [id number] Admission Date/Time: 2021  9:02 AM   Location:  8483/5056-U PCP: Lilly Quinn Day: 4    Assessment and Plan:   Eric Damon is a 31 y.o.  female  who presents with <principal problem not specified>    1. PNEUMONIA DUE TO COVID-19  -Currently stable, she is feeling better. On Olumiant/dexa day 3 , see below     2. ACUTE RESPIRATORY FAILURE WITH HYPOXIA  -sec to above. Currently on 5L will continue to wean as tolerated     3. OBSTRUCTIVE SLEEP APNEA  -Stable, slept last night. She has been using her home machine     4. ENLARGED PULMONARY ARTERY  -Incidental finding on CT. Will follow up with PCP     5. MORBID OBESITY    6. HYPERGLYCEMIA  -sec to steroids. No reported H/O DM or elevated glucose. Add HgbA1C       Diet ADULT DIET; Regular   DVT Prophylaxis [x] Lovenox, []  Heparin, [] SCDs, [] Ambulation   GI Prophylaxis [] PPI,  [] H2 Blocker,  [] Carafate,  [] Diet/Tube Feeds   Code Status Full Code   Disposition Patient requires continued admission due to resp failure   MDM [] Low, [] Moderate,[]  High  Patient's risk as above due to resp failure     History of Present Illness:     Chief Complaint: <principal problem not specified>  Eric Damon is a 31 y.o.  female  who presents with SOB    No issues overnight. She is sitting on side of bed, feeling better       14 point ROS reviewed negative, unless as noted above    Objective: Intake/Output Summary (Last 24 hours) at 2021 1328  Last data filed at 2021 0831  Gross per 24 hour   Intake 240 ml   Output --   Net 240 ml      Vitals:   Vitals:    21 0943   BP:    Pulse:    Resp:    Temp:    SpO2: 96%     Physical Exam:   GEN Awake female, sitting upright in bed in no apparent distress. Appears given age. EYES Pupils are equally round.   No scleral erythema, discharge, or conjunctivitis. HENT Mucous membranes are moist. Oral pharynx without exudates, no evidence of thrush. NECK Supple, no apparent thyromegaly or masses. RESP Clear to auscultation, no wheezes, rales or rhonchi. Symmetric chest movement . CARDIO/VASC S1/S2 auscultated. Regular rate without appreciable murmurs, rubs, or gallops. No JVD or carotid bruits. Peripheral pulses equal bilaterally and palpable. No peripheral edema. GI Abdomen is soft without significant tenderness, masses, or guarding. Bowel sounds are normoactive. Rectal exam deferred. HEME/LYMPH No palpable cervical lymphadenopathy and no hepatosplenomegaly. No petechiae or ecchymoses. MSK No gross joint deformities. SKIN Normal coloration, warm, dry. NEURO Cranial nerves appear grossly intact, normal speech, no lateralizing weakness. PSYCH Awake, alert, oriented x 4. Affect appropriate.         Medications:   Medications:    insulin lispro  0-12 Units SubCUTAneous TID WC    insulin lispro  0-6 Units SubCUTAneous Nightly    sodium chloride flush  5-40 mL IntraVENous 2 times per day    enoxaparin  30 mg SubCUTAneous BID    ascorbic acid  500 mg Oral Daily    dexamethasone  6 mg Oral Daily    vitamin D  2,000 Units Oral Daily    baricitinib  4 mg Oral Daily      Infusions:    dextrose      sodium chloride       PRN Meds: glucose, 15 g, PRN  dextrose, 12.5 g, PRN  glucagon (rDNA), 1 mg, PRN  dextrose, 100 mL/hr, PRN  sodium chloride flush, 5-40 mL, PRN  sodium chloride, 25 mL, PRN  ondansetron, 4 mg, Q8H PRN   Or  ondansetron, 4 mg, Q6H PRN  polyethylene glycol, 17 g, Daily PRN  acetaminophen, 650 mg, Q6H PRN   Or  acetaminophen, 650 mg, Q6H PRN  guaiFENesin-dextromethorphan, 5 mL, Q4H PRN  albuterol sulfate HFA, 2 puff, Q6H PRN

## 2021-11-26 LAB
GLUCOSE BLD-MCNC: 101 MG/DL (ref 70–99)
GLUCOSE BLD-MCNC: 103 MG/DL (ref 70–99)
GLUCOSE BLD-MCNC: 103 MG/DL (ref 70–99)
GLUCOSE BLD-MCNC: 124 MG/DL (ref 70–99)

## 2021-11-26 PROCEDURE — 2580000003 HC RX 258: Performed by: NURSE PRACTITIONER

## 2021-11-26 PROCEDURE — 1200000000 HC SEMI PRIVATE

## 2021-11-26 PROCEDURE — 94761 N-INVAS EAR/PLS OXIMETRY MLT: CPT

## 2021-11-26 PROCEDURE — 82962 GLUCOSE BLOOD TEST: CPT

## 2021-11-26 PROCEDURE — 6360000002 HC RX W HCPCS: Performed by: NURSE PRACTITIONER

## 2021-11-26 PROCEDURE — 6370000000 HC RX 637 (ALT 250 FOR IP): Performed by: NURSE PRACTITIONER

## 2021-11-26 PROCEDURE — 6370000000 HC RX 637 (ALT 250 FOR IP): Performed by: FAMILY MEDICINE

## 2021-11-26 PROCEDURE — 2700000000 HC OXYGEN THERAPY PER DAY

## 2021-11-26 RX ADMIN — ENOXAPARIN SODIUM 30 MG: 100 INJECTION SUBCUTANEOUS at 09:14

## 2021-11-26 RX ADMIN — OXYCODONE HYDROCHLORIDE AND ACETAMINOPHEN 500 MG: 500 TABLET ORAL at 09:14

## 2021-11-26 RX ADMIN — SODIUM CHLORIDE, PRESERVATIVE FREE 10 ML: 5 INJECTION INTRAVENOUS at 09:14

## 2021-11-26 RX ADMIN — SODIUM CHLORIDE, PRESERVATIVE FREE 10 ML: 5 INJECTION INTRAVENOUS at 21:30

## 2021-11-26 RX ADMIN — DEXAMETHASONE 6 MG: 4 TABLET ORAL at 09:14

## 2021-11-26 RX ADMIN — Medication 2000 UNITS: at 09:13

## 2021-11-26 RX ADMIN — ACETAMINOPHEN 650 MG: 325 TABLET ORAL at 21:30

## 2021-11-26 RX ADMIN — ENOXAPARIN SODIUM 30 MG: 100 INJECTION SUBCUTANEOUS at 21:31

## 2021-11-26 RX ADMIN — BARICITINIB 4 MG: 2 TABLET, FILM COATED ORAL at 09:13

## 2021-11-26 RX ADMIN — SODIUM CHLORIDE, PRESERVATIVE FREE 10 ML: 5 INJECTION INTRAVENOUS at 21:31

## 2021-11-26 ASSESSMENT — PAIN SCALES - GENERAL
PAINLEVEL_OUTOF10: 0
PAINLEVEL_OUTOF10: 5
PAINLEVEL_OUTOF10: 0

## 2021-11-26 NOTE — PROGRESS NOTES
Hospitalist Progress Note      Name:  Meaghan Thomas /Age/Sex: 1985  (31 y.o. female)   MRN & CSN:  [id number] & [id number] Admission Date/Time: 2021  9:02 AM   Location:  6984/2961-K PCP: Karen Remy Day: 5    Assessment and Plan:   Meaghan Thomas is a 31 y.o.  female  who presents with <principal problem not specified>    1. PNEUMONIA DUE TO COVID-19  -Currently stable, she is feeling better. On Olumiant/dexa day 4 , see below     2. ACUTE RESPIRATORY FAILURE WITH HYPOXIA  -sec to above. Currently on 4L will continue to wean as tolerated     3. OBSTRUCTIVE SLEEP APNEA  -Stable, she is using her home machine with oxygen bleed in, continue     4. ENLARGED PULMONARY ARTERY  -Incidental finding on CT.  Will follow up with PCP     5. MORBID OBESITY     6. TYPE 2 DIABETES  -New, HgbA1C is 6.6. We did discuss. She would likely benefit from metformin, she previously used for PCOS. If FBG at acceptable level at DC she can address with PCP, may start before discharge    Diet ADULT DIET; Regular   DVT Prophylaxis [x] Lovenox, []  Heparin, [] SCDs, [] Ambulation   GI Prophylaxis [] PPI,  [] H2 Blocker,  [] Carafate,  [] Diet/Tube Feeds   Code Status Full Code   Disposition Patient requires continued admission due to resp failure   MDM [] Low, [] Moderate,[]  High  Patient's risk as above due to resp failure     History of Present Illness:     Chief Complaint: <principal problem not specified>  Meaghan Thomas is a 31 y.o.  female  who presents with SOB    No issues overnight. She is feeling better today no complaints       14 point ROS reviewed negative, unless as noted above    Objective:        Intake/Output Summary (Last 24 hours) at 2021 1302  Last data filed at 2021 2318  Gross per 24 hour   Intake 720 ml   Output --   Net 720 ml      Vitals:   Vitals:    21 0834   BP: 124/69   Pulse: (!) 41   Resp: 16   Temp: 97.3 °F (36.3 °C)   SpO2: 94%     Physical Exam: GEN Awake female, sitting upright in bed in no apparent distress. Appears given age. EYES Pupils are equally round. No scleral erythema, discharge, or conjunctivitis. HENT Mucous membranes are moist. Oral pharynx without exudates, no evidence of thrush. NECK Supple, no apparent thyromegaly or masses. RESP Clear to auscultation, no wheezes, rales or rhonchi. Symmetric chest movement . CARDIO/VASC S1/S2 auscultated. Regular rate without appreciable murmurs, rubs, or gallops. No JVD or carotid bruits. Peripheral pulses equal bilaterally and palpable. No peripheral edema. GI Abdomen is soft without significant tenderness, masses, or guarding. Bowel sounds are normoactive. Rectal exam deferred. HEME/LYMPH No palpable cervical lymphadenopathy and no hepatosplenomegaly. No petechiae or ecchymoses. MSK No gross joint deformities. SKIN Normal coloration, warm, dry. NEURO Cranial nerves appear grossly intact, normal speech, no lateralizing weakness. PSYCH Awake, alert, oriented x 4. Affect appropriate.     I have reviewed recent results including lab testing, radiology and pertinent findings if any are as follows:  HgbA1C 6.6  /119    Medications:   Medications:    insulin lispro  0-12 Units SubCUTAneous TID WC    insulin lispro  0-6 Units SubCUTAneous Nightly    sodium chloride flush  5-40 mL IntraVENous 2 times per day    enoxaparin  30 mg SubCUTAneous BID    ascorbic acid  500 mg Oral Daily    dexamethasone  6 mg Oral Daily    vitamin D  2,000 Units Oral Daily    baricitinib  4 mg Oral Daily      Infusions:    dextrose      sodium chloride       PRN Meds: glucose, 15 g, PRN  dextrose, 12.5 g, PRN  glucagon (rDNA), 1 mg, PRN  dextrose, 100 mL/hr, PRN  sodium chloride flush, 5-40 mL, PRN  sodium chloride, 25 mL, PRN  ondansetron, 4 mg, Q8H PRN   Or  ondansetron, 4 mg, Q6H PRN  polyethylene glycol, 17 g, Daily PRN  acetaminophen, 650 mg, Q6H PRN   Or  acetaminophen, 650 mg, Q6H PRN  guaiFENesin-dextromethorphan, 5 mL, Q4H PRN  albuterol sulfate HFA, 2 puff, Q6H PRN

## 2021-11-26 NOTE — PROGRESS NOTES
Per discussion with patient, patient has O2 at home and uses for bleed on home Cpap. I turned patient down to 2 lpm, She only uses O2 at night with CPAP.

## 2021-11-27 VITALS
SYSTOLIC BLOOD PRESSURE: 128 MMHG | RESPIRATION RATE: 25 BRPM | BODY MASS INDEX: 65.45 KG/M2 | OXYGEN SATURATION: 90 % | DIASTOLIC BLOOD PRESSURE: 89 MMHG | TEMPERATURE: 97.9 F | HEART RATE: 41 BPM | WEIGHT: 293 LBS

## 2021-11-27 LAB
ALBUMIN SERPL-MCNC: 3.6 GM/DL (ref 3.4–5)
ALP BLD-CCNC: 66 IU/L (ref 40–128)
ALT SERPL-CCNC: 129 U/L (ref 10–40)
ANION GAP SERPL CALCULATED.3IONS-SCNC: 9 MMOL/L (ref 4–16)
AST SERPL-CCNC: 54 IU/L (ref 15–37)
BASOPHILS ABSOLUTE: 0 K/CU MM
BASOPHILS RELATIVE PERCENT: 0.2 % (ref 0–1)
BILIRUB SERPL-MCNC: 0.7 MG/DL (ref 0–1)
BUN BLDV-MCNC: 26 MG/DL (ref 6–23)
CALCIUM SERPL-MCNC: 8.5 MG/DL (ref 8.3–10.6)
CHLORIDE BLD-SCNC: 105 MMOL/L (ref 99–110)
CO2: 28 MMOL/L (ref 21–32)
CREAT SERPL-MCNC: 0.6 MG/DL (ref 0.6–1.1)
DIFFERENTIAL TYPE: ABNORMAL
EOSINOPHILS ABSOLUTE: 0.1 K/CU MM
EOSINOPHILS RELATIVE PERCENT: 0.6 % (ref 0–3)
GFR AFRICAN AMERICAN: >60 ML/MIN/1.73M2
GFR NON-AFRICAN AMERICAN: >60 ML/MIN/1.73M2
GLUCOSE BLD-MCNC: 113 MG/DL (ref 70–99)
GLUCOSE BLD-MCNC: 89 MG/DL (ref 70–99)
HCT VFR BLD CALC: 45.9 % (ref 37–47)
HEMOGLOBIN: 14.4 GM/DL (ref 12.5–16)
IMMATURE NEUTROPHIL %: 1.5 % (ref 0–0.43)
LYMPHOCYTES ABSOLUTE: 2 K/CU MM
LYMPHOCYTES RELATIVE PERCENT: 24.1 % (ref 24–44)
MCH RBC QN AUTO: 28.3 PG (ref 27–31)
MCHC RBC AUTO-ENTMCNC: 31.4 % (ref 32–36)
MCV RBC AUTO: 90.4 FL (ref 78–100)
MONOCYTES ABSOLUTE: 0.6 K/CU MM
MONOCYTES RELATIVE PERCENT: 6.9 % (ref 0–4)
NUCLEATED RBC %: 0 %
PDW BLD-RTO: 13.9 % (ref 11.7–14.9)
PLATELET # BLD: 323 K/CU MM (ref 140–440)
PMV BLD AUTO: 9.4 FL (ref 7.5–11.1)
POTASSIUM SERPL-SCNC: 4.5 MMOL/L (ref 3.5–5.1)
RBC # BLD: 5.08 M/CU MM (ref 4.2–5.4)
SEGMENTED NEUTROPHILS ABSOLUTE COUNT: 5.4 K/CU MM
SEGMENTED NEUTROPHILS RELATIVE PERCENT: 66.7 % (ref 36–66)
SODIUM BLD-SCNC: 142 MMOL/L (ref 135–145)
TOTAL IMMATURE NEUTOROPHIL: 0.12 K/CU MM
TOTAL NUCLEATED RBC: 0 K/CU MM
TOTAL PROTEIN: 6.1 GM/DL (ref 6.4–8.2)
WBC # BLD: 8.1 K/CU MM (ref 4–10.5)

## 2021-11-27 PROCEDURE — 94618 PULMONARY STRESS TESTING: CPT

## 2021-11-27 PROCEDURE — 2580000003 HC RX 258: Performed by: NURSE PRACTITIONER

## 2021-11-27 PROCEDURE — 82962 GLUCOSE BLOOD TEST: CPT

## 2021-11-27 PROCEDURE — 6370000000 HC RX 637 (ALT 250 FOR IP): Performed by: NURSE PRACTITIONER

## 2021-11-27 PROCEDURE — 85025 COMPLETE CBC W/AUTO DIFF WBC: CPT

## 2021-11-27 PROCEDURE — 36415 COLL VENOUS BLD VENIPUNCTURE: CPT

## 2021-11-27 PROCEDURE — 80053 COMPREHEN METABOLIC PANEL: CPT

## 2021-11-27 PROCEDURE — 6370000000 HC RX 637 (ALT 250 FOR IP): Performed by: FAMILY MEDICINE

## 2021-11-27 PROCEDURE — 6360000002 HC RX W HCPCS: Performed by: NURSE PRACTITIONER

## 2021-11-27 RX ORDER — DEXAMETHASONE 6 MG/1
6 TABLET ORAL DAILY
Qty: 4 TABLET | Refills: 0 | Status: SHIPPED | OUTPATIENT
Start: 2021-11-28 | End: 2021-11-27 | Stop reason: HOSPADM

## 2021-11-27 RX ORDER — ALBUTEROL SULFATE 90 UG/1
2 AEROSOL, METERED RESPIRATORY (INHALATION) EVERY 6 HOURS PRN
Qty: 18 G | Refills: 0 | Status: SHIPPED | OUTPATIENT
Start: 2021-11-27 | End: 2022-04-04 | Stop reason: ALTCHOICE

## 2021-11-27 RX ORDER — ASCORBIC ACID 500 MG
500 TABLET ORAL DAILY
Qty: 30 TABLET | Refills: 0 | Status: ON HOLD
Start: 2021-11-28 | End: 2022-09-13 | Stop reason: HOSPADM

## 2021-11-27 RX ADMIN — Medication 2000 UNITS: at 08:40

## 2021-11-27 RX ADMIN — ENOXAPARIN SODIUM 30 MG: 100 INJECTION SUBCUTANEOUS at 08:40

## 2021-11-27 RX ADMIN — BARICITINIB 4 MG: 2 TABLET, FILM COATED ORAL at 08:40

## 2021-11-27 RX ADMIN — DEXAMETHASONE 6 MG: 4 TABLET ORAL at 08:40

## 2021-11-27 RX ADMIN — SODIUM CHLORIDE, PRESERVATIVE FREE 10 ML: 5 INJECTION INTRAVENOUS at 08:43

## 2021-11-27 RX ADMIN — OXYCODONE HYDROCHLORIDE AND ACETAMINOPHEN 500 MG: 500 TABLET ORAL at 09:35

## 2021-11-27 NOTE — PROGRESS NOTES
Patient already has Home O2 at night with a CPAP. No need to order overnight Pulse Ox.   Home O2 is completed and patient DID NOT qualify

## 2021-11-27 NOTE — DISCHARGE INSTR - DIET
Good nutrition is important when healing from an illness, injury, or surgery. Follow any nutrition recommendations given to you during your hospital stay. If you were given an oral nutrition supplement while in the hospital, continue to take this supplement at home. You can take it with meals, in-between meals, and/or before bedtime. These supplements can be purchased at most local grocery stores, pharmacies, and chain [employer]-stores. If you have any questions about your diet or nutrition, call the hospital and ask for the dietitian. Recommend limiting carbs due to sugar. Recommend American Diabetes Association website-  www.diabetes. org

## 2021-11-27 NOTE — PROGRESS NOTES
At home oxygen test initated at 1032. At time of initiation patient on 3 LPM via Nasal cannula, oxygen saturation 94%. During trial patient oxygen saturation remained above 92% while at rest and during exertion. At conclusion of trial patient was at 93% on room air. Notified respiratory.      Electronically signed by Dianna Lin RN on 11/27/2021 at 11:39 AM

## 2021-11-27 NOTE — PROGRESS NOTES
Discharge teaching and instructions for Complications of CVID completed with patient using teachback method. AVS reviewed. Reviewed prescriptions called to pharmacy. Patient voiced understanding regarding prescriptions, follow up appointments, and care of self at home. Discharged in a wheelchair to  home with support per family.      Electronically signed by Abdias Ramesh RN on 11/27/2021 at 2:53 PM

## 2021-11-27 NOTE — DISCHARGE SUMMARY
VITAMIN C  Take 1 tablet by mouth daily  Start taking on: November 28, 2021     vitamin D 25 MCG (1000 UT) Caps  Take 2 capsules by mouth daily  Start taking on: November 28, 2021        Mari Le taking these medications    MULTIVITAMIN ADULT PO           Where to Get Your Medications      These medications were sent to [facility]  [address], [address] [place] [address]    Phone: [phone]   · albuterol sulfate  (90 Base) MCG/ACT inhaler     Information about where to get these medications is not yet available    Ask your nurse or doctor about these medications  · ascorbic acid 500 MG tablet  · vitamin D 25 MCG (1000 UT) Caps         Objective Findings at Discharge:   /89   Pulse (!) 41   Temp 97.9 °F (36.6 °C) (Oral)   Resp 25   Wt (!) 393 lb 4.8 oz (178.4 kg)   SpO2 90%   BMI 65.45 kg/m²            PHYSICAL EXAM   GEN Awake female, sitting upright in bed in no apparent distress. Appears given age. EYES Pupils are equally round. No scleral erythema, discharge, or conjunctivitis. HENT Mucous membranes are moist. Oral pharynx without exudates, no evidence of thrush. NECK Supple, no apparent thyromegaly or masses. RESP Clear to auscultation, no wheezes, rales or rhonchi. Symmetric chest movement while on room air. CARDIO/VASC S1/S2 auscultated. Regular rate without appreciable murmurs, rubs, or gallops. No JVD or carotid bruits. Peripheral pulses equal bilaterally and palpable. No peripheral edema. GI Abdomen is soft without significant tenderness, masses, or guarding. Bowel sounds are normoactive. Rectal exam deferred. HEME/LYMPH No palpable cervical lymphadenopathy and no hepatosplenomegaly. No petechiae or ecchymoses. MSK No gross joint deformities. SKIN Normal coloration, warm, dry. NEURO Cranial nerves appear grossly intact, normal speech, no lateralizing weakness. PSYCH Awake, alert, oriented x 4.   Affect appropriate. BMP/CBC  Recent Labs     11/27/21  0251      K 4.5      CO2 28   BUN 26*   CREATININE 0.6   WBC 8.1   HCT 45.9          IMAGING:  CTA OF THE CHEST 11/21/2021 1:56 pm       TECHNIQUE:   CTA of the chest was performed after the administration of intravenous   contrast.  Multiplanar reformatted images are provided for review.  MIP   images are provided for review. Dose modulation, iterative reconstruction,   and/or weight based adjustment of the mA/kV was utilized to reduce the   radiation dose to as low as reasonably achievable.       COMPARISON:   Chest radiograph 11/21/2021.       HISTORY:   ORDERING SYSTEM PROVIDED HISTORY: hypoxia   TECHNOLOGIST PROVIDED HISTORY:   Reason for exam:->hypoxia   Decision Support Exception - unselect if not a suspected or confirmed   emergency medical condition->Emergency Medical Condition (MA)   Reason for Exam: Hypoxia       FINDINGS:   Pulmonary Arteries: The main pulmonary artery is enlarged measuring up to   approximately 4.5 cm in diameter.  The pulmonary arteries are adequately   opacified for evaluation.  Limited evaluation of the subsegmental pulmonary   artery branches secondary to the patient's body habitus.  No pulmonary   embolism identified.       Mediastinum: The thoracic aorta is normal in appearance.  The coronary   arteries and branch vessels of the superior mediastinum and lower neck are   unremarkable.  The heart is upper limits of normal in size.  No pericardial   effusion.  The mediastinal esophagus and visualized aspects of the thyroid   gland are unremarkable.  No mediastinal or hilar lymphadenopathy.       Lungs/pleura:  The central airways are patent.  No pleural effusion or   pneumothorax.  There are diffuse patchy bilateral consolidative opacities   most pronounced in the upper lobes.  No interlobular septal thickening.       Upper Abdomen: Limited images of the upper abdomen are unremarkable.       Soft Tissues/Bones: No

## 2021-11-29 ENCOUNTER — CARE COORDINATION (OUTPATIENT)
Dept: CASE MANAGEMENT | Age: 36
End: 2021-11-29

## 2021-11-29 NOTE — CARE COORDINATION
[facility] 45 Transitions Initial Follow Up Call    Call within 2 business days of discharge: Yes    Patient: Narendra Gavin Patient : 1985   MRN: <[id number]>  Reason for Admission: PNA d/t COVID  Discharge Date: 21 RARS: Readmission Risk Score: 5.4 ( )      Last Discharge [address] [id number]       Complaint Diagnosis Description Type Department Provider    21   Admission (Discharged) Rupal Williamson MD           Spoke with: Narendra Gavin, patient    Facility: [facility] patient for initial [facility] transition follow up. [facility] states that she is doing pretty good so far. Reports she had anxiety the first night she returned because she did not have the oxygen but has been doing fine since. She plans to purchase a pulse oximeter. She denies having any c/o chest pain/discomfort, pressure, tightness, increased shortness of breath, cough, fever, chills. Reports she had a headache yesterday and took Tylenol which was effective. Denies having a headache today. Appetite has been good. Sense of taste and smell slowly returning. Reviewed medication list.  She states her  will be picking up the Albuterol inhaler today. She plans on contacting her PCP office for a follow up appointment. She asked if PCP will have her hospital records. Informed her that if office does not then they can contact the hospital medical records. She verbalized understanding. She does not have any questions or needs at this time. Patient contacted regarding COVID-19 risk, exposure, diagnosis, pulse oximeter ordered at discharge and monoclonal antibody infusion follow up. Discussed COVID-19 related testing which was available at this time. Test results were positive. Patient informed of results, if available? Yes. Care Transition Nurse contacted the patient by telephone to perform post discharge assessment. Call within 2 business days of discharge: Yes.  Verified name and  with patient as identifiers. Provided introduction to self, and explanation of the CTN/ACM role, and reason for call due to risk factors for infection and/or exposure to COVID-19. Symptoms reviewed with patient who verbalized the following symptoms: no new symptoms, no worsening symptoms and headache. Due to no new or worsening symptoms encounter was not routed to provider for escalation. Discussed follow-up appointments. If no appointment was previously scheduled, appointment scheduling offered: Yes-she plans to call the office today. [facility] follow up appointment(s): No future appointments. Non-[facility] follow up appointment(s):      Advance Care Planning:   Does patient have an Advance Directive:  not on file. Educated patient about risk for severe COVID-19 due to risk factors according to CDC guidelines. CTN reviewed discharge instructions, medical action plan and red flag symptoms with the patient who verbalized understanding. Discussed COVID vaccination status: Yes. Education provided on COVID-19 vaccination as appropriate. Discussed exposure protocols and quarantine with CDC Guidelines. Patient was given an opportunity to verbalize any questions and concerns and agrees to contact CTN or health care provider for questions related to their healthcare. Reviewed and educated patient on any new and changed medications related to discharge diagnosis     Was patient discharged with a pulse oximeter? No Discussed and confirmed pulse oximeter discharge instructions and when to notify provider or seek emergency care. CTN provided contact information. Plan for follow up in 7-10 days based on severity of symptoms and risk factors.       Care Transitions 24 Hour Call    Do you have any ongoing symptoms?: Yes  Patient-reported symptoms: Other (Comment: Headache yesterday)  Do you have a copy of your discharge instructions?: Yes  Do you have all of your prescriptions and are they filled?: No  Have you been contacted by a [address]?: No  Have you scheduled your follow up appointment?: No (Comment: She plans to call the office today)  Were you discharged with any Home Care or Post Acute Services: No  Do you feel like you have everything you need to keep you well at home?: Yes  Care Transitions Interventions         Follow Up  No future appointments.     Trevor Espino RN

## 2021-12-07 ENCOUNTER — CARE COORDINATION (OUTPATIENT)
Dept: CASE MANAGEMENT | Age: 36
End: 2021-12-07

## 2021-12-07 NOTE — CARE COORDINATION
[facility] Transitions Follow Up Call    2021    Patient: Krzysztof Longoria  Patient : 1985   MRN: <[id number]>  Reason for Admission: PNA d/t COVID  Discharge Date: 21 RARS: Readmission Risk Score: 5.4 ( )    Attempted to contact patient for COVID transition follow up. Unable to reach patient. Left message with contact information and request for call back. Follow Up  No future appointments.     Deborah Thomas RN

## 2021-12-13 ENCOUNTER — CARE COORDINATION (OUTPATIENT)
Dept: CASE MANAGEMENT | Age: 36
End: 2021-12-13

## 2021-12-13 NOTE — CARE COORDINATION
[facility] Transitions Follow Up Call    2021    Patient: Faith Sharp  Patient : 1985   MRN: <[id number]>  Reason for Admission:   Discharge Date: 21 RARS: Readmission Risk Score: 5.4 ( )    Attempted to contact patient for COVID transition follow up. Unable to reach patient. Left message with contact information and request for call back. Patient has COVID risk score 2. No further outreach. Follow Up  No future appointments.     Enrico Worley RN

## 2022-01-25 NOTE — ADT AUTH CERT
Pneumonia - Care Day 6 (11/27/2021) by Alix Nyhan, RN       Review Status Review Entered   Completed 1/25/2022 08:11      Criteria Review      Care Day: 6 Care Date: 11/27/2021 Level of Care: Inpatient Floor    Guideline Day 2    Clinical Status    (X) * No CO2 retention or acidosis    (X) * No requirement for mechanical ventilation    (X) * Hypotension absent    1/25/2022 8:11 AM EST by Milan Pierce      128/89 23 94% room air    (X) * Afebrile or fever improved    (X) * No hypoxia on room air or oxygenation improved    (X) * Mental status improved or at baseline    Activity    (X) * Increased activity    Routes    (X) Oral hydration    (X) Oral medications    (X) Usual diet    1/25/2022 8:11 AM EST by Milan Pierce      Regular diet    Interventions    (X) Pulse oximetry    1/25/2022 8:11 AM EST by Milan Pierce      oximetry    (X) Possible oxygen    * Milestone   Additional Notes   DATE: 11/27/21            Pertinent Updates:      Hospital Course:   Meaghan Thomas is a 31 y. oArlicmariia Menardel presents with <principal problem not specified>SOB       Patient was admitted, placed on supplemental oxygen dexamethasone and baricitinib. She remained stable and improved daily. Her oxygen was weaned as tolerated and she was feeling better. She was weaned down and today upon home O2 eval noted to be stable without supplemental oxygen. The remainder of her stay was uneventful       1. PNEUMONIA DUE TO COVID-19   -Currently stable, no issues. DC home no further treatment, follow up PCP       2. ACUTE RESPIRATORY FAILURE WITH HYPOXIA   -resolved see below       3. OBSTRUCTIVE SLEEP APNEA   -patient will resume home CPAP with oxygen bleed in       4. ENLARGED PULMONARY ARTERY   -Incidental finding on CT.  Will follow up with PCP       5. MORBID OBESITY       6. TYPE 2 DIABETES   -HgbA1C 6.6. Currently FBG stable and will be off steroids .  Patient advised to discuss with PCP to consider treatment if warranted         The patient expressed appropriate understanding of and agreement with the discharge recommendations, medications, and plan.        Consults this admission:   IP CONSULT TO PHARMACY         Abnl/Pertinent Labs/Radiology/Diagnostic Studies:      Results for Sheree Ingram (MRN [id number]) as of 1/25/2022 07:58      11/27/2021 02:51   Sodium: 142   Potassium: 4.5   Chloride: 105   CO2: 28   BUN: 26 (H)   Creatinine: 0.6   Anion Gap: 9   GFR Non-: >60   GFR African American: >60   Glucose: 113 (H)   CALCIUM, SERUM, [id number]: 8.5   Total Protein: 6.1 (L)   Albumin: 3.6   Alk Phos: 66   ALT: 129 (H)   AST: 54 (H)   Bilirubin: 0.7   WBC: 8.1   RBC: 5.08   Hemoglobin Quant: 14.4   Hematocrit: 45.9   MCV: 90.4   MCH: 28.3   MCHC: 31.4 (L)   MPV: 9.4   RDW: 13.9   Platelet Count: 006   Lymphocyte %: 24.1   Monocytes %: 6.9 (H)   Eosinophils %: 0.6   Basophils %: 0.2   Lymphocytes Absolute: 2.0   Monocytes Absolute: 0.6   Eosinophils Absolute: 0.1   Basophils Absolute: 0.0   Differential Type: AUTOMATED DIFFERENTIAL   Segs Relative: 66.7 (H)   Segs Absolute: 5.4   Nucleated RBC %: 0.0   Immature Neutrophil %: 1.5 (H)   Total Immature Neutrophil: 0.12   Total Nucleated RBC: 0.0      11/27/2021 08:33   POC Glucose: 89         Physical Exam:      PHYSICAL EXAM    GEN    Awake female, sitting upright in bed in no apparent distress. Appears given age. EYES   Pupils are equally round.  No scleral erythema, discharge, or conjunctivitis. HENT  Mucous membranes are moist. Oral pharynx without exudates, no evidence of thrush. NECK  Supple, no apparent thyromegaly or masses. RESP  Clear to auscultation, no wheezes, rales or rhonchi.  Symmetric chest movement while on room air. CARDIO/VASC           S1/S2 auscultated. Regular rate without appreciable murmurs, rubs, or gallops. No JVD or carotid bruits. Peripheral pulses equal bilaterally and palpable. No peripheral edema.    GI        Abdomen is soft without significant tenderness, masses, or guarding. Bowel sounds are normoactive. Rectal exam deferred. HEME/LYMPH            No palpable cervical lymphadenopathy and no hepatosplenomegaly. No petechiae or ecchymoses. MSK    No gross joint deformities. SKIN    Normal coloration, warm, dry. NEURO           Cranial nerves appear grossly intact, normal speech, no lateralizing weakness.    PSYCH            Awake, alert, oriented x 4.  Affect appropriate.             Medications:      Discharge Medications:           Medication List       START taking these medications    albuterol sulfate  (90 Base) MCG/ACT inhaler   Inhale 2 puffs into the lungs every 6 hours as needed for Wheezing or Shortness of Breath       ascorbic acid 500 MG tablet   Commonly known as: VITAMIN C   Take 1 tablet by mouth daily   Start taking on: November 28, 2021       vitamin D 25 MCG (1000 UT) Caps   Take 2 capsules by mouth daily   Start taking on: November 28, 2021           CONTINUE taking these medications    MULTIVITAMIN ADULT PO                              Pneumonia - Care Day 5 (11/26/2021) by Fito Dowell RN       Review Status Review Entered   Completed 1/25/2022 08:05      Criteria Review      Care Day: 5 Care Date: 11/26/2021 Level of Care: Inpatient Floor    Guideline Day 2    Clinical Status    (X) * No CO2 retention or acidosis    (X) * No requirement for mechanical ventilation    (X) * Hypotension absent    1/25/2022 8:05 AM EST by Barbara Mcadams      149/125 82 20 97.9 90% O2 3l/m/nc    (X) * Afebrile or fever improved    ( ) * No hypoxia on room air or oxygenation improved    (X) * Mental status improved or at baseline    Activity    (X) * Increased activity    Routes    (X) Oral hydration    1/25/2022 8:05 AM EST by Barbara Mcadams      Regular diet    (X) Oral medications    (X) Usual diet    1/25/2022 8:05 AM EST by Barbara Mcadams      Regular diet    Interventions    (X) Pulse oximetry    (X) Possible oxygen 1/25/2022 8:05 AM EST by Akhil Jordan      per [place]    * Milestone   Additional Notes   DATE: 11/26/21               Pertinent Updates:      IM Note:          Assessment and Plan:   Rigoberto Younger is a 31 y. oMalachy Portal presents with <principal problem not specified>       1. PNEUMONIA DUE TO COVID-19   -Currently stable, she is feeling better. On Olumiant/dexa day 4 , see below       2. ACUTE RESPIRATORY FAILURE WITH HYPOXIA   -sec to above. Currently on 4L will continue to wean as tolerated       3. OBSTRUCTIVE SLEEP APNEA   -Stable, she is using her home machine with oxygen bleed in, continue       4. ENLARGED PULMONARY ARTERY   -Incidental finding on CT.  Will follow up with PCP       5. MORBID OBESITY       6. TYPE 2 DIABETES   -New, HgbA1C is 6.6. We did discuss. She would likely benefit from metformin, she previously used for PCOS. If FBG at acceptable level at DC she can address with PCP, may start before discharge       Diet ADULT DIET; Regular      Abnl/Pertinent Labs/Radiology/Diagnostic Studies:      Results for Francesca Hinton (MRN [id number]) as of 1/25/2022 07:58      11/26/2021 08:27   POC Glucose: 103 (H)      11/26/2021 11:58   POC Glucose: 103 (H)      11/26/2021 17:12   POC Glucose: 101 (H)      11/26/2021 20:30   POC Glucose: 124 (H)         Physical Exam:   GEN    Awake female, sitting upright in bed in no apparent distress. Appears given age. EYES   Pupils are equally round.  No scleral erythema, discharge, or conjunctivitis. HENT  Mucous membranes are moist. Oral pharynx without exudates, no evidence of thrush. NECK  Supple, no apparent thyromegaly or masses. RESP  Clear to auscultation, no wheezes, rales or rhonchi.  Symmetric chest movement . CARDIO/VASC           S1/S2 auscultated. Regular rate without appreciable murmurs, rubs, or gallops. No JVD or carotid bruits. Peripheral pulses equal bilaterally and palpable. No peripheral edema.    GI        Abdomen is soft without significant tenderness, masses, or guarding. Bowel sounds are normoactive. Rectal exam deferred. HEME/LYMPH            No palpable cervical lymphadenopathy and no hepatosplenomegaly. No petechiae or ecchymoses. MSK    No gross joint deformities. SKIN    Normal coloration, warm, dry. NEURO           Cranial nerves appear grossly intact, normal speech, no lateralizing weakness. PSYCH            Awake, alert, oriented x 4.  Affect appropriate. Medications:      ascorbic acid (VITAMIN C) tablet 500 mg   Dose: 500 mg   Freq: DAILY Route: PO      baricitinib (OLUMIANT) tablet 4 mg   Dose: 4 mg   Freq: DAILY Route: PO      dexamethasone (DECADRON) tablet 6 mg   Dose: 6 mg   Freq: DAILY Route: PO      enoxaparin (LOVENOX) injection 30 mg   Dose: 30 mg   Freq: 2 TIMES DAILY Route: SC      vitamin D CAPS 2,000 Units   Dose: 2,000 Units   Freq: DAILY Route: PO      acetaminophen (TYLENOL) tablet 650 mg   Dose: 650 mg   Freq: EVERY 6 HOURS PRN Route: PO                     Pneumonia - Care Day 4 (11/25/2021) by Fito Dowell RN       Review Status Review Entered   Completed 1/25/2022 07:56      Criteria Review      Care Day: 4 Care Date: 11/25/2021 Level of Care: Inpatient Floor    Guideline Day 2    Clinical Status    (X) * No CO2 retention or acidosis    (X) * No requirement for mechanical ventilation    (X) * Hypotension absent    1/25/2022 7:56 AM EST by Barbara [facility]      120/72 50 19 97.8 96% O2 2l/m/nc    (X) * Afebrile or fever improved    ( ) * No hypoxia on room air or oxygenation improved    (X) * Mental status improved or at baseline    Activity    (X) * Increased activity    Routes    (X) Oral hydration    [facility]25[facility]2022 7:56 [email or web address]      ADULT DIET; Regular [[id number]]    (X) Oral medications    (X) Usual diet    1/25/2022 7:56 AM EST [facility]      ADULT DIET;  Regular [[id number]]    Interventions    (X) Pulse oximetry    1/25/2022 7:56 AM EST [facility]      oximetry (X) Possible oxygen    1/25/2022 7:56 AM EST by Casey Diaz      per [place]    * Milestone   Additional Notes   DATE: 11/25/21            Pertinent Updates:      IM Note:      Assessment and Plan:   Arely Miranda is a 31 y. oGeroldine [place]te presents with <principal problem not specified>       1. PNEUMONIA DUE TO COVID-19   -Currently stable, she is feeling better. On Olumiant/dexa day 3 , see below       2. ACUTE RESPIRATORY FAILURE WITH HYPOXIA   -sec to above. Currently on 5L will continue to wean as tolerated       3. OBSTRUCTIVE SLEEP APNEA   -Stable, slept last night. She has been using her home machine       4. ENLARGED PULMONARY ARTERY   -Incidental finding on CT.  Will follow up with PCP       5. MORBID OBESITY       6. HYPERGLYCEMIA   -sec to steroids. No reported H/O DM or elevated glucose. Add HgbA1C           Diet ADULT DIET; Regular               Abnl/Pertinent Labs/Radiology/Diagnostic Studies:      Results for Sheree Ingram (MRN [id number]) as of 1/25/2022 07:58      11/25/2021 08:35   POC Glucose: 99      11/25/2021 13:15   POC Glucose: 102 (H)      11/25/2021 16:30   Hemoglobin A1C: 6.6 (H)   eAG (mg/dL): 143      11/25/2021 16:42   POC Glucose: 123 (H)      11/25/2021 21:01   POC Glucose: 119 (H)         Physical Exam:      Physical Exam:   GEN    Awake female, sitting upright in bed in no apparent distress. Appears given age. EYES   Pupils are equally round.  No scleral erythema, discharge, or conjunctivitis. HENT  Mucous membranes are moist. Oral pharynx without exudates, no evidence of thrush. NECK  Supple, no apparent thyromegaly or masses. RESP  Clear to auscultation, no wheezes, rales or rhonchi.  Symmetric chest movement . CARDIO/VASC           S1/S2 auscultated. Regular rate without appreciable murmurs, rubs, or gallops. No JVD or carotid bruits. Peripheral pulses equal bilaterally and palpable. No peripheral edema.    GI        Abdomen is soft without significant tenderness,

## 2022-02-17 ENCOUNTER — HOSPITAL ENCOUNTER (OUTPATIENT)
Age: 37
Discharge: HOME | End: 2022-02-17
Payer: COMMERCIAL

## 2022-02-17 ENCOUNTER — OFFICE VISIT (OUTPATIENT)
Dept: BARIATRICS/WEIGHT MGMT | Age: 37
End: 2022-02-17
Payer: COMMERCIAL

## 2022-02-17 VITALS
OXYGEN SATURATION: 96 % | HEART RATE: 96 BPM | DIASTOLIC BLOOD PRESSURE: 97 MMHG | WEIGHT: 293 LBS | SYSTOLIC BLOOD PRESSURE: 142 MMHG | BODY MASS INDEX: 48.82 KG/M2 | HEIGHT: 65 IN

## 2022-02-17 DIAGNOSIS — Z01.818 PREOPERATIVE CLEARANCE: ICD-10-CM

## 2022-02-17 DIAGNOSIS — Z79.899 MEDICATION MANAGEMENT: ICD-10-CM

## 2022-02-17 DIAGNOSIS — E66.01 MORBID OBESITY DUE TO EXCESS CALORIES (HCC): Primary | ICD-10-CM

## 2022-02-17 LAB
AMPHETAMINES: NEGATIVE
BARBITURATE SCREEN URINE: NEGATIVE
BENZODIAZEPINE SCREEN, URINE: NEGATIVE
CANNABINOID SCREEN URINE: ABNORMAL
COCAINE METABOLITE: NEGATIVE
FOLATE: >20 NG/ML (ref 3.1–17.5)
MAGNESIUM: 2 MG/DL (ref 1.8–2.4)
OPIATES, URINE: NEGATIVE
OXYCODONE: NEGATIVE
PHENCYCLIDINE, URINE: NEGATIVE
TSH HIGH SENSITIVITY: 2.35 UIU/ML (ref 0.27–4.2)
VITAMIN B-12: 436.9 PG/ML (ref 211–911)
VITAMIN D 25-HYDROXY: 23.61 NG/ML

## 2022-02-17 PROCEDURE — 81025 URINE PREGNANCY TEST: CPT | Performed by: SURGERY

## 2022-02-17 PROCEDURE — 36415 COLL VENOUS BLD VENIPUNCTURE: CPT

## 2022-02-17 PROCEDURE — 83970 ASSAY OF PARATHORMONE: CPT

## 2022-02-17 PROCEDURE — 93005 ELECTROCARDIOGRAM TRACING: CPT | Performed by: SURGERY

## 2022-02-17 PROCEDURE — 84443 ASSAY THYROID STIM HORMONE: CPT

## 2022-02-17 PROCEDURE — 84630 ASSAY OF ZINC: CPT

## 2022-02-17 PROCEDURE — 82746 ASSAY OF FOLIC ACID SERUM: CPT

## 2022-02-17 PROCEDURE — 82607 VITAMIN B-12: CPT

## 2022-02-17 PROCEDURE — 80307 DRUG TEST PRSMV CHEM ANLYZR: CPT

## 2022-02-17 PROCEDURE — 99204 OFFICE O/P NEW MOD 45 MIN: CPT | Performed by: SURGERY

## 2022-02-17 PROCEDURE — 82306 VITAMIN D 25 HYDROXY: CPT

## 2022-02-17 PROCEDURE — 83735 ASSAY OF MAGNESIUM: CPT

## 2022-02-17 ASSESSMENT — ENCOUNTER SYMPTOMS
BACK PAIN: 1
CHOKING: 0
ALLERGIC/IMMUNOLOGIC NEGATIVE: 1
GASTROINTESTINAL NEGATIVE: 1
EYES NEGATIVE: 1
SHORTNESS OF BREATH: 0

## 2022-02-17 ASSESSMENT — PATIENT HEALTH QUESTIONNAIRE - PHQ9
2. FEELING DOWN, DEPRESSED OR HOPELESS: 0
1. LITTLE INTEREST OR PLEASURE IN DOING THINGS: 0
SUM OF ALL RESPONSES TO PHQ QUESTIONS 1-9: 0
SUM OF ALL RESPONSES TO PHQ9 QUESTIONS 1 & 2: 0

## 2022-02-17 NOTE — PROGRESS NOTES
Initial Bariatric Surgery Consultation History and Physical    Chief Complaint: Obesity Body mass index is 67.35 kg/m². History of Present Illness: The patient is a [address] y.o. female being seen today for initial bariatric surgery consultation. The patient previously attended a bariatric surgery informational seminar or received electronic version of presentation. The patient's PCP is Truong Friedman. The patient first recognized having issues with increased weight approximately \"since I young. \"     The patient identifies the following precipitants causing, or contributing to, weight gain: \"Was dx with PCOS. \"    The patient estimates the lowest weight in the past five years is approximately 315 pounds. The patient estimates the highest weight in the past five years is approximately 411 pounds. The patient's current weight is 404.7 pounds, while the current BMI is Body mass index is 67.35 kg/m². The patient has not had previous bariatric surgery. The patient has tried the following diet(s): Diogo Markus, Weight Watchers, SlimFast, at previous program she was on a low carb diet but without any calorie goal.    The patient has tried the following over-the-counter drugs and/or prescription weight loss medications: Denies. The patient has tried the following physical activities/exercies: in home walking program - walking in place, arm movements    The patient was mostly unsuccessful with previous dietary, medication, and/or physical activity for sustained weight loss.     The patient reports current level of commitment to weight loss as: 100 percent    In reviewing the patient's typical daily diet, it consists of the following:   Breakfast: Shake   Snack: celery and peanut butter and cheese   Lunch: skipped   Snack: denies   Dinner: chicken and veggies   Snack: denies    The patient reports drinking the following beverages throughout the day: water    The patient reports eating outside of the house approximately 1 times per week at either fast food or sit down restaurants. The patient reports the following eating and dietary styles: grazing, nighttime eating    The patient has the following cardiovascular risk factor(s):  hypertension, questionable impaired fasting glucose/diabetes, obstructive sleep apnea (on CPAP), and cigarette smoking (hx of 10 years ago, quit 5 years ago, then restarted 2 years ago and smokes a couple times per month). The patient has the following obesity-related disorder(s):  symptomatic osteoarthritis, PCOS,  and impaired quality of life. Past Medical History:  Past Medical History:   Diagnosis Date    Hypertension    T[place]    Past Surgical History:  No past surgical history on file.    Denies    Family History:  Family History   Problem Relation Age of Onset    High Cholesterol Mother     Cancer Father     Heart Disease Father     Heart Disease Maternal Grandfather     High Blood Pressure Maternal Grandfather     Diabetes Paternal Grandfather     Heart Disease Paternal Grandfather     High Blood Pressure Paternal Grandfather        Social History:  Social History     Socioeconomic History    Marital status:      Spouse name: Not on file    Number of children: Not on file    Years of education: Not on file    Highest education level: Not on file   Occupational History    Not on file   Tobacco Use    Smoking status: Light Tobacco Smoker     Types: Cigarettes     Last attempt to quit: 2013     Years since quittin.7    Smokeless tobacco: Never Used   Vaping Use    Vaping Use: Never used   Substance and Sexual Activity    Alcohol use: No     Comment: ocassionally     Drug use: Never    Sexual activity: Not on file   Other Topics Concern    Not on file   Social History Narrative    Not on file     Social Determinants of Health     Financial Resource Strain:     Difficulty of Paying Living Expenses: Not on file   Food Insecurity:     Worried About Running Out of Food in the Last Year: Not on file    Ran Out of Food in the Last Year: Not on file   Transportation Needs:     Lack of Transportation (Medical): Not on file    Lack of Transportation (Non-Medical): Not on file   Physical Activity:     Days of Exercise per Week: Not on file    Minutes of Exercise per Session: Not on file   Stress:     Feeling of Stress : Not on file   Social Connections:     Frequency of Communication with Friends and Family: Not on file    Frequency of Social Gatherings with Friends and Family: Not on file    Attends [employer] Services: Not on file    Active Member of [address] [place] [address] [employer] or Organizations: Not on file    Attends Club or Organization Meetings: Not on file    Marital Status: Not on file   Intimate Partner Violence:     Fear of Current or Ex-Partner: Not on file    Emotionally Abused: Not on file    Physically Abused: Not on file    Sexually Abused: Not on file   Housing Stability:     Unable to Pay for Housing in the Last Year: Not on file    Number of [place] in the Last Year: Not on file    Unstable Housing in the Last Year: Not on file       Allergies:  No Known Allergies    Medications:  Current Outpatient Medications   Medication Sig Dispense Refill    albuterol sulfate  (90 Base) MCG/ACT inhaler Inhale 2 puffs into the lungs every 6 hours as needed for Wheezing or Shortness of Breath 18 g 0    ascorbic acid (VITAMIN C) 500 MG tablet Take 1 tablet by mouth daily 30 tablet 0    vitamin D 25 MCG (1000 UT) CAPS Take 2 capsules by mouth daily 30 capsule 0    Multiple Vitamins-Minerals (MULTIVITAMIN ADULT PO) Take by mouth daily       No current facility-administered medications for this visit. Review of Systems:  Review of Systems   Constitutional: Positive for activity change and fatigue. HENT: Negative. Eyes: Negative. Respiratory: Negative for choking and shortness of breath. Cardiovascular: Negative for chest pain. Gastrointestinal: Negative. Endocrine: Negative. Genitourinary: Negative. Musculoskeletal: Positive for arthralgias and back pain. Skin: Negative. Allergic/Immunologic: Negative. Neurological: Negative. Hematological: Negative. Psychiatric/Behavioral: Negative. Physical Exam:  Physical Exam  Vitals reviewed. Constitutional:       General: She is not in acute distress. Appearance: She is obese. She is not ill-appearing, toxic-appearing or diaphoretic. HENT:      Head: Normocephalic and atraumatic. Right Ear: External ear normal.      Left Ear: External ear normal.      Nose: Nose normal.   Eyes:      General:         Right eye: No discharge. Left eye: No discharge. Extraocular Movements: Extraocular movements intact. Cardiovascular:      Rate and Rhythm: Normal rate. Pulmonary:      Effort: No respiratory distress. Abdominal:      Tenderness: There is no abdominal tenderness. Musculoskeletal:         General: Swelling present. Cervical back: Normal range of motion. Right lower leg: Edema present. Left lower leg: Edema present. Skin:     General: Skin is warm. Neurological:      General: No focal deficit present. Mental Status: She is alert. Psychiatric:         Mood and Affect: Mood normal.          Assessment and Plan:  Baljeet Brink is a 31 y.o. presenting to clinic for initial surgical evaluation for bariatric surgery. Patient Active Problem List   Diagnosis    Essential hypertension    Patient overweight    Polycystic ovary syndrome    Pneumonia due to COVID-19 virus       Plan   1. Because of the known health risks associated with excess body weight, the patient is a good candidate for bariatric surgery. Reviewed with the patient both sleeve gastrectomy and RYGB, including an overview of each--with pros and cons--while showing them a picture diagram of the new anatomical surgical changes.  The patient is interested in sleeve gastrectomy as initial procedure and may ultimately need ASHLYN-S or RYGB. Discussed with the patient the basics of the surgical procedure including risks, benefits, alternatives, and expected hospital course. Expectations were discussed with the patient and the patient agreed to proceed. The patient previously attended an informational seminar which discussed surgical and non-surgical options as well as procedure specific risks, benefits, and alternatives. 2. Will order initial bariatric surgery labs unless the patient has had any in the previous six months. 3. The patient will be scheduled to be seen by our weight management CNP and registered dietician after having the above labs drawn and resulted. 4. The patient will be referred for an evaluation by physical therapy for assistance with an exercise plan. 5. The patient will be given a bariatric handbook by the dietician at the initial nutrition class. The patient will be instructed to review the handbook and to ask questions about any part which is not clear at any time throughout the workup process. It was stressed to the patient the need to thoroughly review the handbook and the patient agreed. 6. Discussed with patient that losing weight prior to surgery can be indicator of post-operative success. Goal set at this visit for patient for weight loss prior to surgery is ~30-40 lbs. 7. Discussed with patient need for long-term follow-up and vitamin supplementation. The patient is agreeable. 8. This patient is a female of reproductive age and was advised she should not become pregnant during the bariatric workup process and for at least 12-18 months after surgery. The patient is agreeable to this plan. 9. Pt reportedly was in another program and changed bc of insurance changes. We have no information from other program. Unless clearances and labs etc are within 6 months, will need to be redone. D/w pt.      10. Planning on starting pt on Adipex to help with weight loss prior to surgery. Will check UDS and EKG. No contraindications. Checked PDMP - no issues. Thank you for this referral / consult. Please call with any questions or concerns you have. Patient was seen with total face-to-face time and time spent reviewing chart, placing orders, and reviewing past/current results of labs and images of over 45 minutes. More than 50% of this visit was counseling on diet, nutrition, surgical options, and education as above documented in my note.     Electronically signed by Baljeet Chauhan II, MD on 2/17/2022 at 10:07 AM

## 2022-02-18 LAB
EKG ATRIAL RATE: 95 BPM
EKG DIAGNOSIS: NORMAL
EKG P AXIS: 65 DEGREES
EKG P-R INTERVAL: 176 MS
EKG Q-T INTERVAL: 358 MS
EKG QRS DURATION: 118 MS
EKG QTC CALCULATION (BAZETT): 449 MS
EKG R AXIS: 68 DEGREES
EKG T AXIS: 71 DEGREES
EKG VENTRICULAR RATE: 95 BPM

## 2022-02-18 PROCEDURE — 93010 ELECTROCARDIOGRAM REPORT: CPT | Performed by: INTERNAL MEDICINE

## 2022-02-21 ENCOUNTER — TELEPHONE (OUTPATIENT)
Dept: BARIATRICS/WEIGHT MGMT | Age: 37
End: 2022-02-21

## 2022-02-21 ENCOUNTER — HOSPITAL ENCOUNTER (OUTPATIENT)
Age: 37
Discharge: HOME | End: 2022-02-21
Payer: COMMERCIAL

## 2022-02-21 DIAGNOSIS — E66.01 MORBID OBESITY DUE TO EXCESS CALORIES (HCC): ICD-10-CM

## 2022-02-21 DIAGNOSIS — Z79.899 MEDICATION MANAGEMENT: ICD-10-CM

## 2022-02-21 DIAGNOSIS — Z01.818 PREOPERATIVE CLEARANCE: ICD-10-CM

## 2022-02-21 LAB
CHOLESTEROL: 195 MG/DL
HDLC SERPL-MCNC: 38 MG/DL
IRON: 77 UG/DL (ref 37–145)
LDL CHOLESTEROL CALCULATED: 126 MG/DL
PARATHYROID HORMONE INTACT: 38 PG/ML (ref 15–65)
PCT TRANSFERRIN: 28 % (ref 10–44)
TOTAL IRON BINDING CAPACITY: 280 UG/DL (ref 250–450)
TRIGL SERPL-MCNC: 155 MG/DL
UNSATURATED IRON BINDING CAPACITY: 203 UG/DL (ref 110–370)

## 2022-02-21 PROCEDURE — 36415 COLL VENOUS BLD VENIPUNCTURE: CPT

## 2022-02-21 PROCEDURE — 83540 ASSAY OF IRON: CPT

## 2022-02-21 PROCEDURE — 83550 IRON BINDING TEST: CPT

## 2022-02-21 PROCEDURE — 80061 LIPID PANEL: CPT

## 2022-02-22 ENCOUNTER — OFFICE VISIT (OUTPATIENT)
Dept: BARIATRICS/WEIGHT MGMT | Age: 37
End: 2022-02-22

## 2022-02-22 VITALS — BODY MASS INDEX: 48.82 KG/M2 | WEIGHT: 293 LBS | HEIGHT: 65 IN

## 2022-02-22 DIAGNOSIS — E66.01 OBESITY, MORBID, BMI 50 OR HIGHER (HCC): Primary | ICD-10-CM

## 2022-02-22 LAB — ZINC: 88.1 UG/DL (ref 60–120)

## 2022-02-22 PROCEDURE — 99999 PR OFFICE/OUTPT VISIT,PROCEDURE ONLY: CPT

## 2022-02-22 NOTE — PROGRESS NOTES
Outpatient Nutrition Counseling    REASON FOR VISIT: Initial Surgical Class    Chief Complaint:    Chief Complaint   Patient presents with    Weight Management       SUBJECTIVE:  Pt here for initial surgical class. Instructed on mindful eating, label reading, calorie counting, healthy food choices and goal setting. Pt provided meal plan, food lists and goal card. Pt verbalized understanding and signed goal card. The patient is a 31 y.o. female being seen for morbid obesity, considering weight loss surgery; George's, Height: 5' 5\" (165.1 cm), Weight: (!) 404 lb 11.2 oz (183.6 kg), Current Body mass index is 67.35 kg/m². The patient's PCP is Sandor Martini   George's life is significantlyaffected by weight related to her co-morbidities. Comorbid Conditions:  Significant diseases affecting this patient are   Past Medical History:   Diagnosis Date    Hypertension    . Review of Systems - Review of Systems  Otherwise per HPI. Allergies:  No Known Allergies    Past Surgical History:  No past surgical history on file.     Family History:  Family History   Problem Relation Age of Onset    High Cholesterol Mother     Cancer Father     Heart Disease Father     Heart Disease Maternal Grandfather     High Blood Pressure Maternal Grandfather     Diabetes Paternal Grandfather     Heart Disease Paternal Grandfather     High Blood Pressure Paternal Grandfather        Social History:  Social History     Socioeconomic History    Marital status:      Spouse name: Not on file    Number of children: Not on file    Years of education: Not on file    Highest education level: Not on file   Occupational History    Not on file   Tobacco Use    Smoking status: Light Tobacco Smoker     Types: Cigarettes     Last attempt to quit: 2013     Years since quittin.7    Smokeless tobacco: Never Used   Vaping Use    Vaping Use: Never used   Substance and Sexual Activity    Alcohol use: No     Comment: ocassionally     Drug use: Never    Sexual activity: Not on file   Other Topics Concern    Not on file   Social History Narrative    Not on file     Social Determinants of Health     Financial Resource Strain:     Difficulty of Paying Living Expenses: Not on file   Food Insecurity:     Worried About Running Out of Food in the Last Year: Not on file    Diana of Food in the Last Year: Not on file   Transportation Needs:     Lack of Transportation (Medical): Not on file    Lack of Transportation (Non-Medical):  Not on file   Physical Activity:     Days of Exercise per Week: Not on file    Minutes of Exercise per Session: Not on file   Stress:     Feeling of Stress : Not on file   Social Connections:     Frequency of Communication with Friends and Family: Not on file    Frequency of Social Gatherings with Friends and Family: Not on file    Attends Yazdanism Services: Not on file    Active Member of [address] [place] [address] [employer] or Organizations: Not on file    Attends Club or Organization Meetings: Not on file    Marital Status: Not on file   Intimate Partner Violence:     Fear of Current or Ex-Partner: Not on file    Emotionally Abused: Not on file    Physically Abused: Not on file    Sexually Abused: Not on file   Housing Stability:     Unable to Pay for Housing in the Last Year: Not on file    Number of [place] in the Last Year: Not on file    Unstable Housing in the Last Year: Not on file         OBJECTIVE:  Physical Exam   Ht 5' 5\" (1.651 m)   Wt (!) 404 lb 11.2 oz (183.6 kg)   BMI 67.35 kg/m²        NUTRITION DIAGNOSIS: Overweight / Obesity   Problem: Increased adiposity compared to reference standard or established norms   Etiology: Excess intake compared to output over time   S/S: Ht: 65\" Wt: 404.7 lbs BMI: 67.35    NUTRITION INTERVENTIONS:    Individualized treatment goals to address nutritiondiagnosis:   Instructed on 1200 kcal diet for weight loss   Provided sample menus, food lists, and goal card   Encouraged Physical activity as approved by physician    MONITORING/ EVALUATION/ PLAN:   Pt verbalized understanding of allmaterials covered   Pt asked pertinent questions throughout the session - expect compliance with nutrition guidelines presented   Provided pt with contact information should questions arise prior to next visit   Will f/u with pt in 4-5 months for further education and post-op diet instructions  CRISTINE Shah MS, RDN, LD  2/22/2022

## 2022-02-25 ENCOUNTER — TELEPHONE (OUTPATIENT)
Dept: BARIATRICS/WEIGHT MGMT | Age: 37
End: 2022-02-25

## 2022-02-25 DIAGNOSIS — Z01.818 PREOPERATIVE TESTING: Primary | ICD-10-CM

## 2022-02-25 NOTE — TELEPHONE ENCOUNTER
Good morning-    This is Azeem Myers. I signed a release form for my medical records from [facility] and they were sent over. I am waiting to hear back from them because I was cleared by a psychiatrist and you said there was no record of that. Also you had said from March to I believe July there were no records of appointments. I know thats not right either. But I do know that I had to see my pcp Gene Rodriguez in [facility] at least once or twice and those counted as visits towards my 6months visits. Do I need to fill out another release form for you to get those records? Also, when will the appointments be made for the cardiologist and the physical therapy? Wasnt sure if I had tones it for my next visit March 15th to discuss that or if that is something I could be doing now? Thank you for your time.     Azeem Myers

## 2022-02-25 NOTE — TELEPHONE ENCOUNTER
Good Morning,   I gave your records to Dr. Mirella Lomeli to review. Not sure how far he has gotten with them yet. He was only in the office on Tuesday this week due to unforeseen circumstances at the hospital. The physical therapy was sent they should be contacting you for an appointment. We will send Cardiac out probably at your next visit. I can see Jenifer's visits in Care everywhere in our system. With your insurance and the 6 months they have to be consecutive so we'll have to look at see what we can do with them. I will resend the physical therapy referral to them again. If you don't hear from them by next week let me know. We will also need to send a clearance request to the pulmonologist that did you sleep study to see if we can get a clearance from them. If you want to check with [facility] on the status of the psych clearance that would be great. The only issue we may run into is that the clearances have to be within 6 months of the date of surgery. So if some of these clearances are old they may need to be redone.  Thanks     [employer]

## 2022-03-08 ENCOUNTER — HOSPITAL ENCOUNTER (OUTPATIENT)
Dept: PHYSICAL THERAPY | Age: 37
Setting detail: THERAPIES SERIES
Discharge: HOME | End: 2022-03-08
Payer: COMMERCIAL

## 2022-03-08 PROCEDURE — 97161 PT EVAL LOW COMPLEX 20 MIN: CPT

## 2022-03-08 NOTE — PROGRESS NOTES
Physical Therapy  Initial Assessment  Date: 3/8/2022  Patient Name: Britney Gaines  MRN: [id number]  : 1985     Treatment Diagnosis: Bariatric    Subjective   General  Chart Reviewed: Yes  Patient assessed for rehabilitation services?: Yes  Referring Practitioner: [place]  Diagnosis: eris  Follows Commands: Within Functional Limits  PT Visit Information  PT Insurance Information: Umr  Subjective  Subjective: Patient reports she has only seen Dr. Mendy Pack one time and she has seen the dietician. Has not scheduled the cardiologist or psychologist. She says she was almost done with the program prior to having to switch insurance. Pt reports she has added more protein and more veggies to her diet. She has started an in-home walk DVD (approx. 25 minutes) and has resistance bands at home. She reports getting on and off the floor is difficulty. No reported pain today and when she is exercising.   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    Objective  Observation/Palpation  Posture: Fair  Palpation: No pain or tenderness reported with rest or activity  Observation: Pt ambulates without AD and no antalgic gait noted  Edema: No swelling present at this time    PROM RLE (degrees)  RLE PROM: WNL  AROM RLE (degrees)  RLE AROM: WNL  RLE General AROM: RLE AROM: WNL in all directions no increased pain  PROM LLE (degrees)  LLE PROM: WNL  AROM LLE (degrees)  LLE AROM : WNL  LLE General AROM: LLE AROM: WNL in all directions, no increased pain  PROM RUE (degrees)  RUE PROM: WNL  AROM RUE (degrees)  RUE AROM : WNL  PROM LUE (degrees)  LUE PROM: WNL  AROM LUE (degrees)  LUE AROM : WNL  Spine  Lumbar: Minimal limitations with lumbar extension and rotation bilaterally    Strength RLE  Comment: RLE Strength: WNL in all directions, no increased pain reported  R Hip Flexion: 5/5  R Hip Extension: 5/5  R Hip ABduction: 5/5  R Hip ADduction: 5/5  R Hip Internal Rotation: 5/5  R Hip External Rotation: 5/5  R Knee Flexion: 5/5  R Knee Extension: 5/5  R Ankle Dorsiflexion: 5/5  R Ankle Plantar flexion: 5/5  Strength LLE  Comment: LLE Strength: WNL in all directions, no increased pain reported  L Hip Flexion: 5/5  L Hip Extension: 5/5  L Hip ABduction: 5/5  L Hip ADduction: 5/5  L Hip Internal Rotation: 5/5  L Hip External Rotation: 5/5  L Knee Flexion: 5/5  L Knee Extension: 5/5  L Ankle Dorsiflexion: 5/5  L Ankle Plantar Flexion: 5/5  Strength Other  Other: 30 sec STS: 13 times, no increased pain or SOB     Additional Measures  Special Tests: DL/SL Heel Raise: able to complete x10 on ea side with x2 UE assist and no increased pain  Other: 6MWT: (before)  98% O2 sat/98 bpm, (after) 94% O2/124 bpm 1,215 ft without AD  Sensation  Overall Sensation Status: WNL    Assessment   Conditions Requiring Skilled Therapeutic Intervention  Body structures, Functions, Activity limitations: Decreased functional mobility ; Increased pain;Decreased ADL status; Decreased balance;Decreased posture;Decreased strength;Decreased ROM; Decreased endurance  Pt is 31year old female currently enrolled in weight management program. Pt now has difficulties completing prolonged activities including standing and walking due to fatigue and SOB. Pt demo deficits this date that include deconditioning and SOB with continued activity.  Pt will benefit with PT services with return for education on physical activity to safely increase activity to prevent injury and safe weight loss. Patient agrees with established plan of care and assisted in the development of their short term and long term goals. Patient had no adverse reaction with initial treatment and there are no barriers to learning.  Demonstrates no mental or cognitive disorder  Treatment Diagnosis: Bariatric  Prognosis: Good  Decision Making: Low Complexity  Barriers to Learning: none-prefers demo  REQUIRES PT FOLLOW UP: Yes  Activity Tolerance  Activity Tolerance: Patient Tolerated treatment well    Plan   Plan  Times per week: 1x  Plan weeks: 2 weeks  Current Treatment Recommendations: Home Exercise Program,Strengthening,ROM,Neuromuscular Re-education,Balance Training,Endurance Training    Goals  Short term goals  Short term goal 1: Patient will return for Wellness Bariatric class on March 23rd  Patient Goals   Patient goals : weight loss    Joey Witt, PT, DPT, CSCS

## 2022-03-08 NOTE — FLOWSHEET NOTE
Outpatient Physical Therapy  [facility]           [x] Phone: [phone]   Fax: [phone]  [facility]           [] Phone: [phone]   Fax: [phone]        Physical Therapy Daily Treatment Note  Date:  3/8/2022    Patient Name:  Jose Granados    :  1985  MRN: [id number]  Restrictions/Precautions: none  Diagnosis:   Diagnosis: eris  Date of Injury/Surgery: --  Treatment Diagnosis: Treatment Diagnosis: Bariatric    Insurance/Certification information: PT Insurance Information: Umr   Referring Physician:  Referring Practitioner: Alannah Babb  Next Doctor Visit:  --  Plan of care signed (Y/N):  N, sent 3/8/22  Outcome Measure: --  Visit# / total visits:   /  Pain level: 0/10   Goals:     Patient goals : weight loss  Short term goals  Short term goal 1: Patient will return for Wellness Bariatric class on     Summary of Evaluation:  Pt is 31year old female currently enrolled in weight management program. Pt now has difficulties completing prolonged activities including standing and walking due to fatigue and SOB. Pt demo deficits this date that include deconditioning and SOB with continued activity. Pt will benefit with PT services with return for education on physical activity to safely increase activity to prevent injury and safe weight loss. Patient agrees with established plan of care and assisted in the development of their short term and long term goals. Patient had no adverse reaction with initial treatment and there are no barriers to learning. Demonstrates no mental or cognitive disorder    Subjective:  See eval         Any changes in Ambulatory Summary Sheet?   None        Objective:  See eval   COVID screening questions were asked and patient attested that there had been no contact or symptoms        Exercises: (No more than 4 columns)   Exercise/Equipment 3/8/22 #1 Date Date           WARM UP                     TABLE                                       STANDING PROPRIOCEPTION                                    MODALITIES                      Other Therapeutic Activities/Education:  Patient received education on their current pathology and how their condition effects them with their functional activities. Patient understood discussion and questions were answered. Patient understands their activity limitations and understands rational for treatment progression. Home Exercise Program:  HO issued, reviewed and discussed with patient. Pt agreed to comply. Manual Treatments:  --      Modalities:  --      Communication with other providers:  cucoal sent 3/8/22      Assessment:  (Response towards treatment session) (Pain Rating)  Pt is 31year old female currently enrolled in weight management program. Pt now has difficulties completing prolonged activities including standing and walking due to fatigue and SOB. Pt demo deficits this date that include deconditioning and SOB with continued activity. Pt will benefit with PT services with return for education on physical activity to safely increase activity to prevent injury and safe weight loss. Patient agrees with established plan of care and assisted in the development of their short term and long term goals. Patient had no adverse reaction with initial treatment and there are no barriers to learning.  Demonstrates no mental or cognitive disorder      Plan for Next Session:  Return for bariatric wellness class       Time In / Time Out:  3005-2570        Timed Code/Total Treatment Minutes:  28'   (1) PT cucoal        Next Progress Note due:  10th visit      Plan of Care Interventions:  [x] Therapeutic Exercise  [] Modalities:  [x] Therapeutic Activity     [] Ultrasound  [] Estim  [] Gait Training      [] Cervical Traction [] Lumbar Traction  [x] Neuromuscular Re-education    [] Cold/hotpack [] Iontophoresis   [x] Instruction in HEP      [] Vasopneumatic   [] Dry Needling    [] Manual Therapy               [] Aquatic Therapy              Electronically signed by:  Kavya Foreman PT, DPT, CSCS 3/8/2022, 6:43 AM

## 2022-03-08 NOTE — PLAN OF CARE
Outpatient Physical Therapy           [place]           [] Phone: [phone]   Fax: [phone]  [facility] [place]           [] Phone: [phone]   Fax: [phone]     To: Referring Practitioner: Kitty Agee   From: Barbara Judge, PT     Patient: Kandace Schmitt       : 1985  Diagnosis: Diagnosis: eris   Treatment Diagnosis: Treatment Diagnosis: Bariatric   Date: 3/8/2022    Physical Therapy Certification/Re-Certification Form  Dear Dr. Mirella Lomeli,  The following patient has been evaluated for physical therapy services and for therapy to continue, insurance requires physician review of the treatment plan initially and every 90 days. Please review the attached evaluation and/or summary of the patient's plan of care, and verify that you agree therapy should continue by signing the attached document and sending it back to our office. Assessment:  Pt is 31year old female currently enrolled in weight management program. Pt now has difficulties completing prolonged activities including standing and walking due to fatigue and SOB. Pt demo deficits this date that include deconditioning and SOB with continued activity. Pt will benefit with PT services with return for education on physical activity to safely increase activity to prevent injury and safe weight loss. Patient agrees with established plan of care and assisted in the development of their short term and long term goals. Patient had no adverse reaction with initial treatment and there are no barriers to learning.  Demonstrates no mental or cognitive disorder       Plan of Care/Treatment to date:  [x] Therapeutic Exercise  [] Modalities:  [] Therapeutic Activity     [] Ultrasound  [] Electrical Stimulation  [] Gait Training      [] Cervical Traction [] Lumbar Traction  [] Neuromuscular Re-education    [] Cold/hotpack [] Iontophoresis   [x] Instruction in HEP      [] Vasopneumatic    [] Dry Needling  [x] Manual Therapy               [] Aquatic Therapy Other:          Frequency/Duration:  # Days per week: [x] 1 day # Weeks: [] 1 week [] 5 weeks     [] 2 days   [x] 2 weeks [] 6 weeks     [] 3 days   [] 3 weeks [] 7 weeks     [] 4 days   [] 4 weeks [] 8 weeks         [] 9 weeks [] 10 weeks         [] 11 weeks [] 12 weeks    Rehab Potential/Progress: [] Excellent [] Good [] Fair  [] Poor     Goals:    Patient goals : weight loss  Short term goals  Short term goal 1: Patient will return for Wellness Bariatric class on March 23rd    Electronically signed by:  Yasmin Cho PT, DPT, NEAL 3/8/2022, 3:13 PM        If you have any questions or concerns, please don't hesitate to call.   Thank you for your referral.      Physician Signature:________________________________Date:_________ TIME: _____  By signing above, therapists plan is approved by physician

## 2022-03-15 ENCOUNTER — OFFICE VISIT (OUTPATIENT)
Dept: BARIATRICS/WEIGHT MGMT | Age: 37
End: 2022-03-15
Payer: COMMERCIAL

## 2022-03-15 VITALS
BODY MASS INDEX: 48.82 KG/M2 | DIASTOLIC BLOOD PRESSURE: 80 MMHG | HEART RATE: 68 BPM | HEIGHT: 65 IN | WEIGHT: 293 LBS | SYSTOLIC BLOOD PRESSURE: 126 MMHG | OXYGEN SATURATION: 98 %

## 2022-03-15 DIAGNOSIS — G47.33 OBSTRUCTIVE SLEEP APNEA: ICD-10-CM

## 2022-03-15 DIAGNOSIS — E55.9 VITAMIN D DEFICIENCY: ICD-10-CM

## 2022-03-15 DIAGNOSIS — E66.01 OBESITY, MORBID, BMI 50 OR HIGHER (HCC): Primary | ICD-10-CM

## 2022-03-15 DIAGNOSIS — Z01.818 PRE-OP EVALUATION: ICD-10-CM

## 2022-03-15 PROCEDURE — 99213 OFFICE O/P EST LOW 20 MIN: CPT | Performed by: NURSE PRACTITIONER

## 2022-03-15 ASSESSMENT — ENCOUNTER SYMPTOMS
ALLERGIC/IMMUNOLOGIC NEGATIVE: 1
EYES NEGATIVE: 1
GASTROINTESTINAL NEGATIVE: 1

## 2022-03-15 ASSESSMENT — PATIENT HEALTH QUESTIONNAIRE - PHQ9
SUM OF ALL RESPONSES TO PHQ QUESTIONS 1-9: 0
SUM OF ALL RESPONSES TO PHQ9 QUESTIONS 1 & 2: 0
SUM OF ALL RESPONSES TO PHQ QUESTIONS 1-9: 0
2. FEELING DOWN, DEPRESSED OR HOPELESS: 0
1. LITTLE INTEREST OR PLEASURE IN DOING THINGS: 0

## 2022-03-15 NOTE — PROGRESS NOTES
BARIATRIC SURGERY OFFICE PROGRESS NOTE    SUBJECTIVE:    Patient presenting today referred from Keila Harkins, for   Chief Complaint   Patient presents with    Other     2nd surg wm visit -psych -pulm -card   . Vitals:    03/15/22 1135   BP: 126/80   Pulse: 68   SpO2: 98%        BMI: Body mass index is 66.3 kg/m². Weight History: Wt Readings from Last 3 Encounters:   03/15/22 (!) 398 lb 6.4 oz (180.7 kg)   02/22/22 (!) 404 lb 11.2 oz (183.6 kg)   02/17/22 (!) 404 lb 11.2 oz (183.6 kg)         Tianna Mcclure is a 31 y.o. female presenting in second bariatric PRE-OP visit    Diet Recall: Total weight loss/gain: -6.3 lbs since last visit, and -6.3 lbs since starting program     Protein: tracking calories about 0749-4528 daily  Water Intake: about 80 oz a day  Exercise: daily- walking and some resistance training  New health problems: denies  New medications: using thrive patches for appetite suppressant/ vitamins  Clearances:  -- Cardiology: referral sent  -- Pulmonology: referral sent   -- Psychology: referral sent    Thoroughly reviewed the patient's medical history, family history, social history and review of systems with the patient today in the office. Please see medical record for pertinent positives. Past Medical History:   Diagnosis Date    Hypertension         Patient Active Problem List   Diagnosis    Essential hypertension    Patient overweight    Polycystic ovary syndrome    Pneumonia due to COVID-19 virus       No past surgical history on file.     Current Outpatient Medications   Medication Sig Dispense Refill    Collagen-Vitamin C-Biotin (COLLAGEN 1500/C PO) Take by mouth      albuterol sulfate  (90 Base) MCG/ACT inhaler Inhale 2 puffs into the lungs every 6 hours as needed for Wheezing or Shortness of Breath 18 g 0    ascorbic acid (VITAMIN C) 500 MG tablet Take 1 tablet by mouth daily 30 tablet 0    vitamin D 25 MCG (1000 UT) CAPS Take 2 capsules by mouth daily 30 capsule 0    Multiple Vitamins-Minerals (MULTIVITAMIN ADULT PO) Take by mouth daily       No current facility-administered medications for this visit. No Known Allergies      Review of Systems   Constitutional: Positive for fatigue. HENT: Negative. Eyes: Negative. Respiratory:        Sleep apnea   Cardiovascular: Negative. Gastrointestinal: Negative. Endocrine: Negative. Genitourinary:        PCOS   Musculoskeletal: Negative. Skin: Negative. Allergic/Immunologic: Negative. Neurological: Negative. Hematological: Negative. Psychiatric/Behavioral: Negative. OBJECTIVE:    /80 (Site: Left Upper Arm, Position: Sitting, Cuff Size: Large Adult)   Pulse 68   Ht 5' 5\" (1.651 m)   Wt (!) 398 lb 6.4 oz (180.7 kg)   SpO2 98%   BMI 66.30 kg/m²      Physical Exam  Constitutional:       Appearance: Normal appearance. HENT:      Head: Normocephalic. Nose: Nose normal.      Mouth/Throat:      Pharynx: Oropharynx is clear. Eyes:      Conjunctiva/sclera: Conjunctivae normal.      Pupils: Pupils are equal, round, and reactive to light. Cardiovascular:      Rate and Rhythm: Normal rate. Pulses: Normal pulses. Pulmonary:      Effort: Pulmonary effort is normal.      Breath sounds: Normal breath sounds. Abdominal:      General: Bowel sounds are normal.      Palpations: Abdomen is soft. Comments: Large/ obese ABD   Musculoskeletal:         General: Normal range of motion. Cervical back: Normal range of motion. Skin:     General: Skin is warm and dry. Capillary Refill: Capillary refill takes less than 2 seconds. Neurological:      General: No focal deficit present. Mental Status: She is alert and oriented to person, place, and time. Psychiatric:         Mood and Affect: Mood normal.         Behavior: Behavior normal.         ASSESSMENT & PLAN:    1.  Obesity, morbid, BMI 50 or higher ([facility] [address]  -Patient was encouraged to journal all food intake.   -Keep calorie level at approximately 1200, per discussion / plan with registered dietician.  -Protein intake is to be a minimum of 60 grams per day. -Water drinking was encouraged with a goal of 64oz-128oz daily. Beverages are to be calorie free except for milk. Avoid soda. -Continue to increase level of physical activity. 2. Pre-op evaluation  - labs and medications reviewed  - Trig and LDL slightly elevated- will repeat later in program  - drug screen positive for marijuana- can not start Adipex per recommendations until negative drug screen. Does not have a medical marijuana card. Pt made aware. Will repeat UDS in one month  - If UDS negative next month, will start Adipex  - Ambulatory referral to Cardiology  - Ambulatory referral to Pulmonology  - Amb External Referral To Psychology  - Urine Drug Screen; Future  - Inquiring about starting BCP for PCOS- informed pt that progesterone based BCP would be best at this time d/t having to discontinue medication prior to surgery if was estrogen based. 3. Obstructive sleep apnea  - pulmonology referral    4. Vitamin D deficiency  - continue to take supplements  - vit D level WNL with recent labs    This patient is a female of reproductive age and was advised she should not become pregnant during the bariatric workup process and for at least 12-18 months after surgery. The patient is agreeable to this plan. I spent 25 minutes with the patient face to face today and over 50% of the office visit today was spent in face to face counseling regarding diet and exercise, in preparation for her planned Robotic Sleeve Gastrectomy.     Discussed in length complying with the dietary recommendations, complying with the preoperative workup including dietary counseling, exercise physiologist counseling, and pre-operative optimization of pulmonologist and cardiologist.    The patient expressed understanding and willingness to comply nicely; all questions and concerns addressed. No orders of the defined types were placed in this encounter. Orders Placed This Encounter   Procedures    Urine Drug Screen     Standing Status:   Future     Standing Expiration Date:   3/15/2023    Ambulatory referral to Cardiology     Referral Priority:   Routine     Referral Type:   Consult for Advice and Opinion     Referred to Provider:   Miguelina Gomez MD     Number of Visits Requested:   1    Ambulatory referral to Pulmonology     Referral Priority:   Routine     Referral Type:   Consult for Advice and Opinion     Referral Reason:   Specialty Services Required     Referred to Provider:   Lena Chance MD     Number of Visits Requested:   1    Amb External Referral To Psychology     Referral Priority:   Routine     Referral Reason:   Specialty Services Required     Referred to Provider:   Rose Marie Grimaldo     Requested Specialty:   Psychology     Number of Visits Requested:   1       Follow Up:  Return in about 1 month (around 4/15/2022).     Monica Zafar, TRICIA - CNP

## 2022-03-23 ENCOUNTER — HOSPITAL ENCOUNTER (OUTPATIENT)
Dept: PHYSICAL THERAPY | Age: 37
Setting detail: THERAPIES SERIES
Discharge: HOME | End: 2022-03-23
Payer: COMMERCIAL

## 2022-03-23 PROCEDURE — 97150 GROUP THERAPEUTIC PROCEDURES: CPT

## 2022-03-25 ENCOUNTER — TELEPHONE (OUTPATIENT)
Dept: CARDIOLOGY CLINIC | Age: 37
End: 2022-03-25

## 2022-04-04 PROBLEM — G47.33 OSA (OBSTRUCTIVE SLEEP APNEA): Status: ACTIVE | Noted: 2022-04-04

## 2022-04-08 ENCOUNTER — INITIAL CONSULT (OUTPATIENT)
Dept: CARDIOLOGY CLINIC | Age: 37
End: 2022-04-08
Payer: COMMERCIAL

## 2022-04-08 VITALS
DIASTOLIC BLOOD PRESSURE: 82 MMHG | BODY MASS INDEX: 48.82 KG/M2 | WEIGHT: 293 LBS | HEART RATE: 76 BPM | HEIGHT: 65 IN | SYSTOLIC BLOOD PRESSURE: 172 MMHG

## 2022-04-08 DIAGNOSIS — I10 ESSENTIAL HYPERTENSION: ICD-10-CM

## 2022-04-08 DIAGNOSIS — E66.01 CLASS 3 SEVERE OBESITY DUE TO EXCESS CALORIES WITHOUT SERIOUS COMORBIDITY WITH BODY MASS INDEX (BMI) OF 60.0 TO 69.9 IN ADULT (HCC): ICD-10-CM

## 2022-04-08 DIAGNOSIS — G47.33 OSA (OBSTRUCTIVE SLEEP APNEA): ICD-10-CM

## 2022-04-08 DIAGNOSIS — R06.02 SHORTNESS OF BREATH: ICD-10-CM

## 2022-04-08 DIAGNOSIS — G47.33 OBSTRUCTIVE SLEEP APNEA: Primary | ICD-10-CM

## 2022-04-08 DIAGNOSIS — Z72.0 NICOTINE ABUSE: ICD-10-CM

## 2022-04-08 DIAGNOSIS — Z01.810 PREOP CARDIOVASCULAR EXAM: ICD-10-CM

## 2022-04-08 PROCEDURE — 99204 OFFICE O/P NEW MOD 45 MIN: CPT | Performed by: INTERNAL MEDICINE

## 2022-04-08 RX ORDER — VALSARTAN AND HYDROCHLOROTHIAZIDE 80; 12.5 MG/1; MG/1
1 TABLET, FILM COATED ORAL DAILY
Qty: 30 TABLET | Refills: 3 | Status: SHIPPED | OUTPATIENT
Start: 2022-04-08 | End: 2022-08-10

## 2022-04-08 NOTE — PROGRESS NOTES
Sanna Huizar MD                                  CARDIOLOGY  NOTE         Chief Complaint:    Chief Complaint   Patient presents with    Hypertension     Denies CP, SOB, Palpitations, Edema, and Dizziness.  Cardiac Clearance        HPI:     Nuria Cheung is a 31y.o. year old female with past medical history significant for morbid obesity BMI of 67.23, presents for preop assessment. Patient has prior medical history significant for obesity, obstructive sleep apnea on CPAP, recently diagnosed hypertension currently not on medications, occasional smoker. Patient is anticipating gastric weight loss surgery. As part of work-up patient presents for preop cardiac risk assessment    Patient denies any chest pain  + Mild shortness of breath with exertion  No palpitations    No prior hx of CAD, CHF, Arrhythmias     EKG  normal sinus rhythm, right bundle branch block, nonspecific ST-T changes        Current Outpatient Medications   Medication Sig Dispense Refill    CPAP Machine MISC 14 cm by CPAP route nightly      OXYGEN Inhale 2 L into the lungs nightly      Collagen-Vitamin C-Biotin (COLLAGEN 1500/C PO) Take by mouth      ascorbic acid (VITAMIN C) 500 MG tablet Take 1 tablet by mouth daily 30 tablet 0    vitamin D 25 MCG (1000 UT) CAPS Take 2 capsules by mouth daily 30 capsule 0    Multiple Vitamins-Minerals (MULTIVITAMIN ADULT PO) Take by mouth daily       No current facility-administered medications for this visit. Allergies:     Patient has no known allergies. Patient History:    Past Medical History:   Diagnosis Date    Hypertension     Sleep apnea      History reviewed. No pertinent surgical history.   Family History   Problem Relation Age of Onset    High Cholesterol Mother     Cancer Father     Heart Disease Father     Heart Disease Maternal Grandfather     High Blood Pressure Maternal Grandfather     Diabetes Paternal Grandfather     Heart Disease Paternal [place] High Blood Pressure Paternal Grandfather      Social History     Tobacco Use    Smoking status: Current Some Day Smoker     Types: Cigarettes     Last attempt to quit: 2013     Years since quittin.8    Smokeless tobacco: Never Used   Substance Use Topics    Alcohol use: Yes     Comment: ocassionally         Review of Systems:     · Constitutional:  No Fever or Weight Loss   · Eyes: No Decreased Vision  · ENT: No Headaches, Hearing Loss or Vertigo  · Cardiovascular: No Chest Pain,  + Shortness of breath, No Palpitations. No Edema   · Respiratory: No cough or wheezing . No Respiratory distress   · Gastrointestinal: No abdominal pain, appetite loss, blood in stools, constipation, diarrhea or heartburn  · Genitourinary: No dysuria, trouble voiding, or hematuria  · Musculoskeletal:  denies any new  joint aches , or pain   · Integumentary: No rash or pruritis  · Neurological: No TIA or stroke symptoms  · Psychiatric: No anxiety or depression  · Endocrine: No malaise, fatigue or temperature intolerance  · Hematologic/Lymphatic: No bleeding problems, blood clots or swollen lymph nodes  · Allergic/Immunologic: No nasal congestion or hives        Objective:      Physical Exam:    BP (!) 172/82   Pulse 76   Ht 5' 5\" (1.651 m)   Wt (!) 404 lb (183.3 kg)   BMI 67.23 kg/m²   Wt Readings from Last 3 Encounters:   22 (!) 404 lb (183.3 kg)   22 (!) 398 lb (180.5 kg)   03/15/22 (!) 398 lb 6.4 oz (180.7 kg)     Body mass index is 67.23 kg/m². Vitals:    22 0834   BP: (!) 172/82   Pulse: 76        General Appearance and Constitutional: Conversant, Well developed, Well nourished, No acute distress, Non-toxic appearance. HEENT:  Normocephalic, Atraumatic, Bilateral external ears normal, Oropharynx moist, No oral exudates,   Nose normal.   Neck- Normal range of motion, No tenderness, Supple  Eyes:  EOMI, Conjunctiva normal, No discharge.    Respiratory:  Normal breath sounds, No respiratory distress, No 172/82. Prior readings elevated as well. Blood pressure is poorly controlled. Start patient on Diovan hydrochlorothiazide. 80/12.5 mg daily. Advised patient to check her blood pressure routinely at home with a blood pressure machine and log, bring upon next visit. 3. Obstructive sleep apnea: Continue compliance with CPAP. 4. Shortness of breath with exertion: Likely secondary to obstructive sleep apnea and morbid obesity/debility. Obtain echocardiogram to rule out structural heart disease and exercise stress test to rule out ischemic heart disease as below  5. Nicotine dependence: Patient was counseled against smoking. 6. Pre op Risk Assessment     Obtain Exercise stress test for risk stratification  Echocardiogram to rule out structural heart disease       Return in about 1 month (around 5/8/2022). Counseled extensively and medication compliance urged. We discussed that for the  prevention of ASCVD our  goal is aggressive risk modification. Patient is encouraged to exercise even a brisk walk for 30 minutes  at least 3 to 4 times a week   Various goals were discussed and questions answered. Continue current medications. Appropriate prescriptions are addressed and refills ordered. Questions answered and patient verbalizes understanding. Call for any problems, questions, or concerns.

## 2022-04-14 ENCOUNTER — HOSPITAL ENCOUNTER (OUTPATIENT)
Age: 37
Discharge: HOME | End: 2022-04-14
Payer: COMMERCIAL

## 2022-04-14 DIAGNOSIS — Z01.818 PRE-OP EVALUATION: ICD-10-CM

## 2022-04-14 LAB
AMPHETAMINES: NEGATIVE
BARBITURATE SCREEN URINE: NEGATIVE
BENZODIAZEPINE SCREEN, URINE: NEGATIVE
CANNABINOID SCREEN URINE: NEGATIVE
COCAINE METABOLITE: NEGATIVE
OPIATES, URINE: NEGATIVE
OXYCODONE: NEGATIVE
PHENCYCLIDINE, URINE: NEGATIVE

## 2022-04-14 PROCEDURE — 80307 DRUG TEST PRSMV CHEM ANLYZR: CPT

## 2022-04-19 ENCOUNTER — OFFICE VISIT (OUTPATIENT)
Dept: BARIATRICS/WEIGHT MGMT | Age: 37
End: 2022-04-19
Payer: COMMERCIAL

## 2022-04-19 VITALS
BODY MASS INDEX: 48.82 KG/M2 | HEART RATE: 78 BPM | WEIGHT: 293 LBS | DIASTOLIC BLOOD PRESSURE: 80 MMHG | OXYGEN SATURATION: 100 % | HEIGHT: 65 IN | SYSTOLIC BLOOD PRESSURE: 130 MMHG

## 2022-04-19 DIAGNOSIS — Z01.818 PRE-OP EVALUATION: ICD-10-CM

## 2022-04-19 DIAGNOSIS — G47.33 OBSTRUCTIVE SLEEP APNEA: ICD-10-CM

## 2022-04-19 DIAGNOSIS — E66.01 OBESITY, MORBID, BMI 50 OR HIGHER (HCC): Primary | ICD-10-CM

## 2022-04-19 DIAGNOSIS — Z79.899 MEDICATION MANAGEMENT: ICD-10-CM

## 2022-04-19 PROCEDURE — 99213 OFFICE O/P EST LOW 20 MIN: CPT | Performed by: NURSE PRACTITIONER

## 2022-04-19 RX ORDER — PHENTERMINE HYDROCHLORIDE 37.5 MG/1
37.5 TABLET ORAL
Qty: 30 TABLET | Refills: 0 | Status: SHIPPED | OUTPATIENT
Start: 2022-04-19 | End: 2022-05-19

## 2022-04-19 ASSESSMENT — ENCOUNTER SYMPTOMS
RESPIRATORY NEGATIVE: 1
EYES NEGATIVE: 1
ALLERGIC/IMMUNOLOGIC NEGATIVE: 1
GASTROINTESTINAL NEGATIVE: 1

## 2022-04-19 NOTE — PROGRESS NOTES
BARIATRIC SURGERY OFFICE PROGRESS NOTE    SUBJECTIVE:    Patient presenting today referred from Jesenia Clements for   Chief Complaint   Patient presents with    Weight Management     3RD SURG WM    . Vitals:    04/19/22 1339   BP: 130/80   Pulse: 78   SpO2: 100%        BMI: Body mass index is 66.21 kg/m². Weight History: Wt Readings from Last 3 Encounters:   04/19/22 (!) 397 lb 14.4 oz (180.5 kg)   04/08/22 (!) 404 lb (183.3 kg)   04/04/22 (!) 398 lb (180.5 kg)         Jace Echevarria is a 31 y.o. female presenting in third bariatric PRE-OP visit    Diet Recall: Total weight loss/gain: -0.7 lbs since last visit, and -7.0 lbs since starting program     Protein: trying to track calories; about 900-1200 daily  Water Intake: at least 64 oz water   Exercise: walking / weight resistance  New health problems: denies  New medications: started on new b/p med  Clearances:  -- Cardiology: stress test ordered  -- Pulmonology: cleared 4/4/22  -- Psychology: waiting on clearance letter    Repeat UDS done and now negative. Will start patient on Adipex to assist with pre-surgical weight loss. Thoroughly reviewed the patient's medical history, family history, social history and review of systems with the patient today in the office. Please see medical record for pertinent positives. Past Medical History:   Diagnosis Date    Hypertension     Sleep apnea         Patient Active Problem List   Diagnosis    Essential hypertension    Patient overweight    Polycystic ovary syndrome    Pneumonia due to COVID-19 virus    TENNILLE (obstructive sleep apnea)    Class 3 severe obesity due to excess calories without serious comorbidity with body mass index (BMI) of 60.0 to 69.9 in adult [facility])       No past surgical history on file.     Current Outpatient Medications   Medication Sig Dispense Refill    valsartan-hydroCHLOROthiazide (DIOVAN HCT) 80-12.5 MG per tablet Take 1 tablet by mouth daily 30 tablet 3    CPAP Machine MISC 14 cm by CPAP route nightly      OXYGEN Inhale 2 L into the lungs nightly      Collagen-Vitamin C-Biotin (COLLAGEN 1500/C PO) Take by mouth      ascorbic acid (VITAMIN C) 500 MG tablet Take 1 tablet by mouth daily 30 tablet 0    vitamin D 25 MCG (1000 UT) CAPS Take 2 capsules by mouth daily 30 capsule 0    Multiple Vitamins-Minerals (MULTIVITAMIN ADULT PO) Take by mouth daily       No current facility-administered medications for this visit. No Known Allergies      Review of Systems   Constitutional: Negative. HENT: Negative. Eyes: Negative. Respiratory: Negative. Cardiovascular: Negative. Gastrointestinal: Negative. Endocrine: Negative. Genitourinary: Negative. Musculoskeletal: Negative. Skin: Negative. Allergic/Immunologic: Negative. Neurological: Negative. Hematological: Negative. Psychiatric/Behavioral: Negative. OBJECTIVE:    /80   Pulse 78   Ht 5' 5\" (1.651 m)   Wt (!) 397 lb 14.4 oz (180.5 kg)   SpO2 100%   BMI 66.21 kg/m²      Physical Exam  Constitutional:       Appearance: Normal appearance. HENT:      Head: Normocephalic. Nose: Nose normal.      Mouth/Throat:      Pharynx: Oropharynx is clear. Eyes:      Conjunctiva/sclera: Conjunctivae normal.      Pupils: Pupils are equal, round, and reactive to light. Cardiovascular:      Rate and Rhythm: Normal rate. Pulses: Normal pulses. Pulmonary:      Effort: Pulmonary effort is normal.      Breath sounds: Normal breath sounds. Abdominal:      General: Bowel sounds are normal.      Palpations: Abdomen is soft. Comments: Large ABD   Musculoskeletal:         General: Normal range of motion. Cervical back: Normal range of motion. Skin:     General: Skin is warm and dry. Capillary Refill: Capillary refill takes less than 2 seconds. Neurological:      General: No focal deficit present.       Mental Status: She is alert and oriented to person, place, and time. Psychiatric:         Mood and Affect: Mood normal.         Behavior: Behavior normal.         ASSESSMENT & PLAN:    1. Pre-op evaluation  - Pulmonary cleared   - Continue to work on remainder clearances  - Will consent for EGD next visit    2. Obstructive sleep apnea  - Cleared by pulm  - Continue cpap as ordered    3. Obesity, morbid, BMI 50 or higher ([facility] [address]  -Patient was encouraged to journal all food intake.   -Keep calorie level at approximately 1200, per discussion / plan with registered dietician.  -Protein intake is to be a minimum of 60 grams per day. -Water drinking was encouraged with a goal of 64oz-128oz daily. Beverages are to be calorie free except for milk. Avoid soda. -Continue to increase level of physical activity. 4. Medication management  - Recent UDS negative  - Recent CBC, CMP, and EKG reviewed  - Meets criteria to start Adipex      PLAN:  - Recent Labs, UDS, and EKG reviewed and WNL    - Patient will start on Adipex in am and take as directed    - Call for adverse side effects or concerns    - BMI is over 30, or over 27 with weight-related risk factors. - Pt aware Adipex can only be used short term (3 months). - Pt aware no breaks in treatment allowed or must be off for 6 months. - Pt aware that weight must be lost at each visit or medication will be discontinued. - Pt is aware that in order to be successful, must combine Adipex with appropriate diet and exercise plan. - Pt provided with medication handout that covers use; side effects (common side effects include insomnia, dry mouth, constipation, nervousness, increased heart rate, hypertension);    precautions; and interactions. - Pt aware must meet monthly in person while on this medication.     - Check [place] [employer] Rx Reporting System. Any concerns: NONE Reported     - If elderly or renal impairment, will use lower dose (15 mg daily) and avoid all together if GFR is less than 15.  N/A     - RTC in one month      This patient is a female of reproductive age and was advised she should not become pregnant during the bariatric workup process and for at least 12-18 months after surgery. The patient is agreeable to this plan. I spent 25 minutes with the patient face to face today and over 50% of the office visit today was spent in face to face counseling regarding diet and exercise, in preparation for her planned Robotic Sleeve Gastrectomy. Discussed in length complying with the dietary recommendations, complying with the preoperative workup including dietary counseling, exercise physiologist counseling, and pre-operative optimization of pulmonologist and cardiologist.    The patient expressed understanding and willingness to comply nicely; all questions and concerns addressed. No orders of the defined types were placed in this encounter. No orders of the defined types were placed in this encounter. Follow Up:  Return in about 1 month (around 5/19/2022).     Francy Cardenas, TRICIA - CNP

## 2022-04-21 ENCOUNTER — PROCEDURE VISIT (OUTPATIENT)
Dept: CARDIOLOGY CLINIC | Age: 37
End: 2022-04-21
Payer: COMMERCIAL

## 2022-04-21 DIAGNOSIS — G47.33 OSA (OBSTRUCTIVE SLEEP APNEA): ICD-10-CM

## 2022-04-21 DIAGNOSIS — R06.02 SHORTNESS OF BREATH: ICD-10-CM

## 2022-04-21 DIAGNOSIS — I10 ESSENTIAL HYPERTENSION: ICD-10-CM

## 2022-04-21 DIAGNOSIS — E66.01 CLASS 3 SEVERE OBESITY DUE TO EXCESS CALORIES WITHOUT SERIOUS COMORBIDITY WITH BODY MASS INDEX (BMI) OF 60.0 TO 69.9 IN ADULT (HCC): ICD-10-CM

## 2022-04-21 DIAGNOSIS — Z01.810 PREOP CARDIOVASCULAR EXAM: ICD-10-CM

## 2022-04-21 DIAGNOSIS — Z01.810 PREOP CARDIOVASCULAR EXAM: Primary | ICD-10-CM

## 2022-04-21 DIAGNOSIS — G47.33 OBSTRUCTIVE SLEEP APNEA: ICD-10-CM

## 2022-04-21 DIAGNOSIS — Z72.0 NICOTINE ABUSE: ICD-10-CM

## 2022-04-21 LAB
LV EF: 58 %
LVEF MODALITY: NORMAL

## 2022-04-21 PROCEDURE — 93306 TTE W/DOPPLER COMPLETE: CPT | Performed by: INTERNAL MEDICINE

## 2022-04-21 PROCEDURE — 93015 CV STRESS TEST SUPVJ I&R: CPT | Performed by: INTERNAL MEDICINE

## 2022-04-22 ENCOUNTER — TELEPHONE (OUTPATIENT)
Dept: CARDIOLOGY CLINIC | Age: 37
End: 2022-04-22

## 2022-05-03 ENCOUNTER — OFFICE VISIT (OUTPATIENT)
Dept: CARDIOLOGY CLINIC | Age: 37
End: 2022-05-03
Payer: COMMERCIAL

## 2022-05-03 VITALS
DIASTOLIC BLOOD PRESSURE: 76 MMHG | SYSTOLIC BLOOD PRESSURE: 110 MMHG | BODY MASS INDEX: 48.82 KG/M2 | WEIGHT: 293 LBS | HEART RATE: 88 BPM | RESPIRATION RATE: 16 BRPM | HEIGHT: 65 IN

## 2022-05-03 DIAGNOSIS — G47.33 OSA (OBSTRUCTIVE SLEEP APNEA): ICD-10-CM

## 2022-05-03 DIAGNOSIS — I27.20 MILD PULMONARY HYPERTENSION (HCC): Primary | ICD-10-CM

## 2022-05-03 DIAGNOSIS — Z01.818 PRE-OPERATIVE CLEARANCE: ICD-10-CM

## 2022-05-03 DIAGNOSIS — I10 ESSENTIAL HYPERTENSION: ICD-10-CM

## 2022-05-03 DIAGNOSIS — E66.01 CLASS 3 SEVERE OBESITY DUE TO EXCESS CALORIES WITHOUT SERIOUS COMORBIDITY WITH BODY MASS INDEX (BMI) OF 60.0 TO 69.9 IN ADULT (HCC): ICD-10-CM

## 2022-05-03 PROCEDURE — 99214 OFFICE O/P EST MOD 30 MIN: CPT | Performed by: NURSE PRACTITIONER

## 2022-05-03 NOTE — ASSESSMENT & PLAN NOTE
Revised Cardiac Risk Index:   High risk type of surgery no   H/O ischemic heart disease  (h/o MI, or a positive stress test, current complaint of chest pain considered to be secondary to myocardial ischemia,  Use of nitrate therapy or ECG with pathological Q waves; do not count prior coronary revascularization procedure unless one of the other criteria for ischemic heart disease is present) no     H/O heart failure no  H/O CVA no   H/O DM treated with insulin no  Preoperative serum creatinine >2.0 mg/dl (177 micromol/L) no     The calculated rate of cardiac death, nonfatal myocardial infarction, and nonfatal cardiac arrest according to the number of predictors is; No risk factors  0.4% (95% CI: 0.1-0.8)     she is considered a low risk for surgery.

## 2022-05-03 NOTE — ASSESSMENT & PLAN NOTE
- RSVP 35 mmHg, mild pulmonary hypertension. Recommend weight loss, TENNILLE CPAP, quit tobacco use, BP control.

## 2022-05-03 NOTE — PROGRESS NOTES
KG ([facility]    [address], [address]  Phone: [phone]    Fax [phone]                  Ben Fox MD, Harlan Etienne MD, Monique Arnold MD, MD Daniel Vallecillo MD, Dima Harrell MD, Daron Jha MD, [address] [facility], [facility], [facility], [facility]        Cardiology Progress Note      5/3/2022    RE: Dixon Darling  ([birth year])                             Primary cardiologist: Dr. Lacie Quevedo      Subjective:  CC:   1. Mild pulmonary hypertension ([facility]    2. Essential hypertension    3. Class 3 severe obesity due to excess calories without serious comorbidity with body mass index (BMI) of 60.0 to 69.9 in adult ([facility]    4. Pre-operative clearance    5. TENNILLE (obstructive sleep apnea)        HPI: Dixon Darling, who is a  31y.o. year old female with a past medical history as listed below. Patient presents to the office for follow up on obesity, HTN, pre-operative clearance, TENNILLE, and hyperlipidemia. Patient preop for gastric surgery. patient is  an active female who walks regularly. Patient is  compliant with medications. Patient denies any chest pain, shortness of breath, dizziness, syncope, or palpitations. Past Medical History:   Diagnosis Date    H/O Doppler echocardiogram 04/21/2022    EF 55-60%. MIld concentric LV hypertrophy. RVSP 35 mild PHTN.  History of exercise stress test 04/21/2022    ECG portion of stress test is negative for ischemia by diagnostic criteria. HTN respnse to exercise and frequent PVCs noted post exercise. Norman score 3.    Hypertension     Sleep apnea        Current Outpatient Medications   Medication Sig Dispense Refill    phentermine (ADIPEX-P) 37.5 MG tablet Take 1 tablet by mouth every morning (before breakfast) for 30 days. 1/2 tab daily x3-5 days and then full tab.  BMI 30 tablet 0    valsartan-hydroCHLOROthiazide (DIOVAN HCT) 80-12.5 MG per tablet Take 1 tablet by mouth daily 30 tablet 3    CPAP Machine MISC 14 cm by CPAP route nightly      Collagen-Vitamin C-Biotin (COLLAGEN 1500/C PO) Take by mouth      ascorbic acid (VITAMIN C) 500 MG tablet Take 1 tablet by mouth daily 30 tablet 0    vitamin D 25 MCG (1000 UT) CAPS Take 2 capsules by mouth daily 30 capsule 0    Multiple Vitamins-Minerals (MULTIVITAMIN ADULT PO) Take by mouth daily      OXYGEN Inhale 2 L into the lungs nightly (Patient not taking: Reported on 5/3/2022)       No current facility-administered medications for this visit. Review of Systems:  Review of Systems   Cardiovascular: Negative for chest pain, palpitations and leg swelling. Musculoskeletal: Negative. Skin: Negative. Neurological: Negative for dizziness and weakness. All other systems reviewed and are negative. Objective:      Physical Exam:  /76   Pulse 88   Resp 16   Ht 5' 5\" (1.651 m)   Wt (!) 394 lb (178.7 kg)   BMI 65.57 kg/m²   Wt Readings from Last 3 Encounters:   05/03/22 (!) 394 lb (178.7 kg)   04/19/22 (!) 397 lb 14.4 oz (180.5 kg)   04/08/22 (!) 404 lb (183.3 kg)     Body mass index is 65.57 kg/m². Physical exam:  Physical Exam  Constitutional:       Appearance: She is well-developed. Cardiovascular:      Rate and Rhythm: Normal rate and regular rhythm. Pulses: Intact distal pulses. Dorsalis pedis pulses are 2+ on the right side and 2+ on the left side. Posterior tibial pulses are 2+ on the right side and 2+ on the left side. Heart sounds: Normal heart sounds, S1 normal and S2 normal.   Pulmonary:      Effort: Pulmonary effort is normal.      Breath sounds: Normal breath sounds. Musculoskeletal:         General: Normal range of motion. Skin:     General: Skin is warm and dry. Neurological:      Mental Status: She is alert and oriented to person, place, and time.           DATA:  No results found for: CKTOTAL, CKMB, CKMBINDEX, TROPONINI  BNP:  No results found for: BNP  PT/INR:  No results found for: PTINR  Lab Results   Component Value Date    LABA1C 6.6 (H) 11/25/2021     Lab Results   Component Value Date    CHOL 195 02/21/2022    TRIG 155 (H) 02/21/2022    HDL 38 (L) 02/21/2022    LDLCALC 126 (H) 02/21/2022     Lab Results   Component Value Date     (H) 11/27/2021    AST 54 (H) 11/27/2021     TSH:  No results found for: TSH    Vitals:    05/03/22 1612   BP: 110/76   Pulse: 88   Resp: 16       Echo:4/21/22  Left ventricular systolic function is normal with an ejection fraction of   55-60%. Mild concentric left ventricular hypertrophy. No significant valvular disease noted. Right ventricular systolic pressure of 35 mmHg consistent with mild   pulmonary hypertension. No evidence of pericardial effusion. Stress Test:4/21/22  No ischemia, frequent PVC's       The ASCVD Risk score (Raymon Catherine., et al., 2013) failed to calculate for the following reasons: The 2013 ASCVD risk score is only valid for ages 36 to 78      Assessment/ Plan:     Pre-operative clearance   Revised Cardiac Risk Index:   High risk type of surgery no   H/O ischemic heart disease  (h/o MI, or a positive stress test, current complaint of chest pain considered to be secondary to myocardial ischemia,  Use of nitrate therapy or ECG with pathological Q waves; do not count prior coronary revascularization procedure unless one of the other criteria for ischemic heart disease is present) no     H/O heart failure no  H/O CVA no   H/O DM treated with insulin no  Preoperative serum creatinine >2.0 mg/dl (177 micromol/L) no     The calculated rate of cardiac death, nonfatal myocardial infarction, and nonfatal cardiac arrest according to the number of predictors is; No risk factors  0.4% (95% CI: 0.1-0.8)     she is considered a low risk for surgery.      Class 3 severe obesity due to excess calories without serious comorbidity with body mass index (BMI) of 60.0 to 69.9 in adult [facility])   -Discussed the importance of diet and exercise and assisting with weight loss. Patient informed of ideal body weight and high risk mortality associated with obesity. Patient voices understanding. Essential hypertension   - Stable, continue with valsartan-hydrochlorothiazide 80-12.5 mg daily    TENNILLE (obstructive sleep apnea)   - Recommend weight loss, continue with admission nightly. Mild pulm hypertension noted on echo. Mild pulmonary hypertension (HCC)   - RSVP 35 mmHg, mild pulmonary hypertension. Recommend weight loss, TENNILLE CPAP, quit tobacco use, BP control. Patient seen, interviewed and examined. Testing was reviewed. Patient is encouraged to exercise even a brisk walk for 30 minutes at least 3 to 4 times a week. Lifestyle and risk factor modificatons discussed. Various goals are discussed and questions answered. Continue current medications. Appropriate prescriptions are addressed. Questions answered and patient verbalizes understanding. Call for any problems, questions, or concerns. Pt is to follow up in 6 months for Cardiac management    Electronically signed by Charan Brown.  TRICIA Cruz CNP on 5/3/2022 at 6:45 PM

## 2022-05-25 ENCOUNTER — OFFICE VISIT (OUTPATIENT)
Dept: BARIATRICS/WEIGHT MGMT | Age: 37
End: 2022-05-25
Payer: COMMERCIAL

## 2022-05-25 VITALS
OXYGEN SATURATION: 100 % | DIASTOLIC BLOOD PRESSURE: 80 MMHG | BODY MASS INDEX: 48.82 KG/M2 | WEIGHT: 293 LBS | SYSTOLIC BLOOD PRESSURE: 130 MMHG | HEIGHT: 65 IN | HEART RATE: 87 BPM

## 2022-05-25 DIAGNOSIS — E66.01 OBESITY, MORBID, BMI 50 OR HIGHER (HCC): Primary | ICD-10-CM

## 2022-05-25 DIAGNOSIS — Z79.899 MEDICATION MANAGEMENT: ICD-10-CM

## 2022-05-25 DIAGNOSIS — Z01.818 PRE-OP EVALUATION: ICD-10-CM

## 2022-05-25 DIAGNOSIS — E55.9 VITAMIN D DEFICIENCY: ICD-10-CM

## 2022-05-25 LAB
CONTROL: NORMAL
PREGNANCY TEST URINE, POC: NORMAL

## 2022-05-25 PROCEDURE — 99213 OFFICE O/P EST LOW 20 MIN: CPT | Performed by: NURSE PRACTITIONER

## 2022-05-25 RX ORDER — PHENTERMINE HYDROCHLORIDE 37.5 MG/1
37.5 TABLET ORAL
Qty: 30 TABLET | Refills: 0 | Status: SHIPPED | OUTPATIENT
Start: 2022-05-25 | End: 2022-06-24

## 2022-05-25 NOTE — PROGRESS NOTES
BARIATRIC SURGERY OFFICE PROGRESS NOTE    SUBJECTIVE:    Patient presenting today referred from [facility], for   Chief Complaint   Patient presents with   [address] [place] [address] Weight Management     4th surg wm    . Vitals:    05/25/22 1433   BP: 130/80   Pulse: 87   SpO2: 100%        BMI: Body mass index is 63.1 kg/m². Weight History: Wt Readings from Last 3 Encounters:   05/25/22 (!) 379 lb 3.2 oz (172 kg)   05/03/22 (!) 394 lb (178.7 kg)   04/19/22 (!) 397 lb 14.4 oz (180.5 kg)         Aimna Rojo is a 31 y.o. female presenting in fourth bariatric PRE-OP visit    Diet Recall: Total weight loss/gain: -18.5 lbs since last visit, and -25.5 lbs since starting program     Protein: tracking calories; about 1200 daily  Water Intake: 100 oz daily  Exercise: walking and weight bearing exercises  New health problems: Denies  New medications: Denies  Clearances:  -- Cardiology: cleared 5/3/22  -- Pulmonology: cleared 4/4/22  -- Psychology: cleared 3/28/22    Thoroughly reviewed the patient's medical history, family history, social history and review of systems with the patient today in the office. Please see medical record for pertinent positives. Past Medical History:   Diagnosis Date    H/O Doppler echocardiogram 04/21/2022    EF 55-60%. MIld concentric LV hypertrophy. RVSP 35 mild PHTN.  History of exercise stress test 04/21/2022    ECG portion of stress test is negative for ischemia by diagnostic criteria. HTN respnse to exercise and frequent PVCs noted post exercise.  Norman score 3.    Hypertension     Sleep apnea         Patient Active Problem List   Diagnosis    Essential hypertension    Patient overweight    Polycystic ovary syndrome    Pneumonia due to COVID-19 virus    TENNILLE (obstructive sleep apnea)    Class 3 severe obesity due to excess calories without serious comorbidity with body mass index (BMI) of 60.0 to 69.9 in adult [facility])    Pre-operative clearance    Mild pulmonary hypertension [facility])       History reviewed. No pertinent surgical history. Current Outpatient Medications   Medication Sig Dispense Refill    phentermine (ADIPEX-P) 37.5 MG tablet Take 1 tablet by mouth every morning (before breakfast) for 30 days. BMI 44. 30 tablet 0    valsartan-hydroCHLOROthiazide (DIOVAN HCT) 80-12.5 MG per tablet Take 1 tablet by mouth daily 30 tablet 3    CPAP Machine MISC 14 cm by CPAP route nightly      OXYGEN Inhale 2 L into the lungs nightly (Patient not taking: Reported on 5/3/2022)      Collagen-Vitamin C-Biotin (COLLAGEN 1500/C PO) Take by mouth      ascorbic acid (VITAMIN C) 500 MG tablet Take 1 tablet by mouth daily 30 tablet 0    vitamin D 25 MCG (1000 UT) CAPS Take 2 capsules by mouth daily 30 capsule 0    Multiple Vitamins-Minerals (MULTIVITAMIN ADULT PO) Take by mouth daily       No current facility-administered medications for this visit. No Known Allergies      Review of Systems   Constitutional: Negative. HENT: Negative. Eyes: Negative. Respiratory: Negative. Cardiovascular: Negative. Gastrointestinal: Negative. Endocrine: Negative. Genitourinary: Negative. Musculoskeletal: Negative. Skin: Negative. Allergic/Immunologic: Negative. Neurological: Negative. Hematological: Negative. Psychiatric/Behavioral: Negative. OBJECTIVE:    /80   Pulse 87   Ht 5' 5\" (1.651 m)   Wt (!) 379 lb 3.2 oz (172 kg)   SpO2 100%   BMI 63.10 kg/m²      Physical Exam  Constitutional:       Appearance: Normal appearance. She is obese. HENT:      Head: Normocephalic. Nose: Nose normal.      Mouth/Throat:      Pharynx: Oropharynx is clear. Eyes:      Conjunctiva/sclera: Conjunctivae normal.      Pupils: Pupils are equal, round, and reactive to light. Cardiovascular:      Rate and Rhythm: Normal rate. Pulses: Normal pulses. Pulmonary:      Effort: Pulmonary effort is normal.      Breath sounds: Normal breath sounds.    Abdominal: General: Bowel sounds are normal.      Palpations: Abdomen is soft. Musculoskeletal:         General: Normal range of motion. Cervical back: Normal range of motion. Skin:     General: Skin is warm and dry. Capillary Refill: Capillary refill takes less than 2 seconds. Neurological:      General: No focal deficit present. Mental Status: She is alert and oriented to person, place, and time. Psychiatric:         Mood and Affect: Mood normal.         Behavior: Behavior normal.         ASSESSMENT & PLAN:    1. Obesity, morbid, [facility] or [facility]    -Patient was encouraged to journal all food intake.   -Keep calorie level at approximately 1200, per discussion / plan with registered dietician.  -Protein intake is to be a minimum of 60 grams per day. -Water drinking was encouraged with a goal of 64oz-128oz daily. Beverages are to be calorie free except for milk. Avoid soda. -Continue to increase level of physical activity. 2. Vitamin D deficiency  - continue with supplements    3. Pre-op evaluation  - All clearances met  - Consented for EGD  - Nicotine testing next visit    4. Medication management  - Doing well; down 18 pounds since last visit  - Denies any side effects  - Preg test negative  - Rx Adipex refill sent for second month        This patient is a female of reproductive age and was advised she should not become pregnant during the bariatric workup process and for at least 12-18 months after surgery. The patient is agreeable to this plan. I spent 25 minutes with the patient face to face today and over 50% of the office visit today was spent in face to face counseling regarding diet and exercise, in preparation for her planned Robotic Sleeve Gastrectomy.     Discussed in length complying with the dietary recommendations, complying with the preoperative workup including dietary counseling, exercise physiologist counseling, and pre-operative optimization of pulmonologist and cardiologist.    The patient expressed understanding and willingness to comply nicely; all questions and concerns addressed. Orders Placed This Encounter   Medications    phentermine (ADIPEX-P) 37.5 MG tablet     Sig: Take 1 tablet by mouth every morning (before breakfast) for 30 days. BMI 44. Dispense:  30 tablet     Refill:  0     Orders Placed This Encounter   Procedures    POCT urine pregnancy     This order was created through External Result Entry       Follow Up:  Return in about 1 month (around 6/25/2022).     Brian Vargas, APRN - CNP

## 2022-05-27 ENCOUNTER — TELEPHONE (OUTPATIENT)
Dept: BARIATRICS/WEIGHT MGMT | Age: 37
End: 2022-05-27

## 2022-05-27 NOTE — TELEPHONE ENCOUNTER
SPOKE TO  Kelly (EGD W/ BX) SCHEDULED @ [facility].  NOTIFIED OF DATES, TIMES AND LOCATION    PHONE ASSESSMENT   SURGERY - 6/17/22 @ 6306  P/O - MONTHLY VISIT     NPO AFTER MIDNIGHT  HOLD BLOOD THINNERS

## 2022-06-02 PROBLEM — Z01.818 PRE-OPERATIVE CLEARANCE: Status: RESOLVED | Noted: 2022-05-03 | Resolved: 2022-06-02

## 2022-06-08 NOTE — PROGRESS NOTES
Patient will arrive at [address] [place] [address] at [facility] on 6/17/2022 for her procedure at [phone].               1. Do not eat or drink anything after midnight - unless instructed by your doctor prior to surgery. This includes                   no water, chewing gum or mints. 2. Follow your directions as prescribed by the doctor for your procedure and medications. 3. Check with your Doctor regarding stopping vitamins, supplements, blood thinners (Plavix, Coumadin, Lovenox, Effient, Pradaxa, Xarelto, Fragmin or                   other blood thinners) and follow their instructions. 4. Do not smoke, and do not drink any alcoholic beverages 24 hours prior to surgery. This includes NA Beer. No street drugs 7 days prior to surgery. 5. You may brush your teeth and gargle the morning of surgery. DO NOT SWALLOW WATER   6. You MUST make arrangements for a responsible adult to take you home after your surgery and be able to check on you every couple                   hours for the day. You will not be allowed to leave alone or drive yourself home. It is strongly suggested someone stay with you the first 24                   hrs. Your surgery will be cancelled if you do not have a ride home. 7. Please wear simple, loose fitting clothing to the hospital.  [place] not bring valuables (money, credit cards, checkbooks, etc.) Do not wear any                   makeup (including no eye makeup) or nail polish on your fingers or toes. 8. DO NOT wear any jewelry or piercings on day of surgery. All body piercing jewelry must be removed. 9. If you have dentures, they will be removed before going to the OR; we will provide you a container. If you wear contact lenses or glasses,                  they will be removed; please bring a case for them. 10. If you  have a Living Will and Durable Power of  for Healthcare, please bring in a copy.            11. Please bring picture ID,  insurance card, paperwork from the doctors office    (H & P, Consent, & card for implantable devices). 12. Take a shower the morning of your procedure with Hibiclens or an anti-bacterial soap. Do not apply any make-up, deodorant, lotion, oil or powder. 13.  Enter thru the main entrance wearing a mask on the day of surgery. Patient verbalized understanding concerning her EGD and had no questions at this time.

## 2022-06-16 ENCOUNTER — ANESTHESIA EVENT (OUTPATIENT)
Dept: ENDOSCOPY | Age: 37
End: 2022-06-16
Payer: COMMERCIAL

## 2022-06-16 NOTE — ANESTHESIA PRE PROCEDURE
Department of Anesthesiology  Preprocedure Note       Name:  Otto Walden   Age:  31 y.o.  :  1985                                          MRN:  [id number]         Date:  2022      Surgeon: Sharan Corrales):  Mala Stinson MD    Procedure: Procedure(s):  EGD ESOPHAGOGASTRODUODENOSCOPY WITH BX    Medications prior to admission:   Prior to Admission medications    Medication Sig Start Date End Date Taking? Authorizing Provider   phentermine (ADIPEX-P) 37.5 MG tablet Take 1 tablet by mouth every morning (before breakfast) for 30 days. BMI 44. 522  Francy Cardenas, APRN - CNP   valsartan-hydroCHLOROthiazide (DIOVAN HCT) 80-12.5 MG per tablet Take 1 tablet by mouth daily 22   Annalise Myers MD   CPAP Machine MISC 14 cm by CPAP route nightly    Historical Provider, MD   OXYGEN Inhale 2 L into the lungs nightly     Historical Provider, MD   Collagen-Vitamin C-Biotin (COLLAGEN 1500/C PO) Take by mouth    Historical Provider, MD   ascorbic acid (VITAMIN C) 500 MG tablet Take 1 tablet by mouth daily 21   Estefany Garrido MD   vitamin D 25 MCG (1000 UT) CAPS Take 2 capsules by mouth daily 21   Estefany Garrido MD   Multiple Vitamins-Minerals (MULTIVITAMIN ADULT PO) Take by mouth daily    Historical Provider, MD       Current medications:    No current facility-administered medications for this encounter. Current Outpatient Medications   Medication Sig Dispense Refill    phentermine (ADIPEX-P) 37.5 MG tablet Take 1 tablet by mouth every morning (before breakfast) for 30 days.  BMI 44. 30 tablet 0    valsartan-hydroCHLOROthiazide (DIOVAN HCT) 80-12.5 MG per tablet Take 1 tablet by mouth daily 30 tablet 3    CPAP Machine MISC 14 cm by CPAP route nightly      OXYGEN Inhale 2 L into the lungs nightly       Collagen-Vitamin C-Biotin (COLLAGEN 1500/C PO) Take by mouth      ascorbic acid (VITAMIN C) 500 MG tablet Take 1 tablet by mouth daily 30 tablet 0    vitamin D 25 MCG (1000 UT) CAPS Take 2 capsules by mouth daily 30 capsule 0    Multiple Vitamins-Minerals (MULTIVITAMIN ADULT PO) Take by mouth daily         Allergies:  No Known Allergies    Problem List:    Patient Active Problem List   Diagnosis Code    Essential hypertension I10    Patient overweight E66.3    Polycystic ovary syndrome E28.2    Pneumonia due to COVID-19 virus U07.1, J12.82    TENNILLE (obstructive sleep apnea) G47.33    Class 3 severe obesity due to excess calories without serious comorbidity with body mass index (BMI) of 60.0 to 69.9 in adult ([facility] E66.01, Z68.44    Mild pulmonary hypertension ([facility] I27.20       Past Medical History:        Diagnosis Date    H/O Doppler echocardiogram 2022    EF 55-60%. MIld concentric LV hypertrophy. RVSP 35 mild PHTN.  History of exercise stress test 2022    ECG portion of stress test is negative for ischemia by diagnostic criteria. HTN respnse to exercise and frequent PVCs noted post exercise.  Norman score 3.    Hypertension     Sleep apnea        Past Surgical History:        Procedure Laterality Date    ENDOSCOPY, COLON, DIAGNOSTIC         Social History:    Social History     Tobacco Use    Smoking status: Former Smoker     Types: Cigarettes     Quit date: 2013     Years since quittin.0    Smokeless tobacco: Never Used   Substance Use Topics    Alcohol use: Yes     Comment: ocassionally                                 Counseling given: Not Answered      Vital Signs (Current):   Vitals:    22 0935   Weight: (!) 379 lb (171.9 kg)   Height: 5' 5\" (1.651 m)                                              BP Readings from Last 3 Encounters:   22 130/80   22 110/76   22 130/80       NPO Status:                                                                                 BMI:   Wt Readings from Last 3 Encounters:   06/15/22 (!) 379 lb (171.9 kg)   22 (!) 379 lb 3.2 oz (172 kg)   22 (!) 394 lb (178.7 kg)     Body mass index is 63.07 kg/m². CBC:   Lab Results   Component Value Date    WBC 8.1 11/27/2021    RBC 5.08 11/27/2021    HGB 14.4 11/27/2021    HCT 45.9 11/27/2021    MCV 90.4 11/27/2021    RDW 13.9 11/27/2021     11/27/2021       CMP:   Lab Results   Component Value Date     11/27/2021    K 4.5 11/27/2021     11/27/2021    CO2 28 11/27/2021    BUN 26 11/27/2021    CREATININE 0.6 11/27/2021    GFRAA >60 11/27/2021    LABGLOM >60 11/27/2021    GLUCOSE 113 11/27/2021    PROT 6.1 11/27/2021    PROT 6.9 02/05/2011    CALCIUM 8.5 11/27/2021    BILITOT 0.7 11/27/2021    ALKPHOS 66 11/27/2021    AST 54 11/27/2021     11/27/2021       POC Tests: No results for input(s): POCGLU, POCNA, POCK, POCCL, POCBUN, POCHEMO, POCHCT in the last 72 hours.     Coags:   Lab Results   Component Value Date    PROTIME 13.3 11/22/2021    INR 1.03 11/22/2021    APTT 34.5 11/22/2021       HCG (If Applicable):   Lab Results   Component Value Date    PREGTESTUR neg 05/25/2022        ABGs: No results found for: PHART, PO2ART, ETI0EPO, VPW9AWH, BEART, Y1MPPAPY     Type & Screen (If Applicable):  No results found for: LABABO, LABRH    Drug/Infectious Status (If Applicable):  No results found for: HIV, HEPCAB    COVID-19 Screening (If Applicable): No results found for: COVID19        Anesthesia Evaluation  Patient summary reviewed no history of anesthetic complications:   Airway: Mallampati: III  TM distance: >3 FB   Neck ROM: full  Mouth opening: > = 3 FB   Dental: normal exam         Pulmonary: breath sounds clear to auscultation  (+) sleep apnea: on CPAP,                            ROS comment: Former smoker   Cardiovascular:  Exercise tolerance: good (>4 METS),   (+) hypertension:, pulmonary hypertension: mild,       ECG reviewed  Rhythm: regular  Rate: normal  Echocardiogram reviewed  Stress test reviewed       Beta Blocker:  Not on Beta Blocker      ROS comment: Summary   Left ventricular systolic function is normal with an ejection fraction of   55-60%. Mild concentric left ventricular hypertrophy. No significant valvular disease noted. Right ventricular systolic pressure of 35 mmHg consistent with mild   pulmonary hypertension. No evidence of pericardial effusion. Signature      ------------------------------------------------------------------   Electronically signed by Courtney Jacobs MD (Interpreting   physician) on 04/21/2022 at 03:05 PM    Summary   ECG portion of stress test is negative for ischemia by diagnostic criteria   but   Hypertensive response to exercise and Frequent PVC's noted post exercise   during recovery   Completed 4.9 METS and 3.19 Mins of exercise   Norman score of 3 ( Moderate Risk)      Signature      ------------------------------------------------------------------   Electronically signed by Geni Dominguez MD (Interpreting   physician) on 04/21/2022 at 03:16 PM     Neuro/Psych:   Negative Neuro/Psych ROS              GI/Hepatic/Renal:   (+) morbid obesity (super morbid BMI 63.9)          Endo/Other: Negative Endo/Other ROS                    Abdominal:             Vascular: negative vascular ROS. Other Findings:           Anesthesia Plan      MAC     ASA 4       Induction: intravenous. Anesthetic plan and risks discussed with patient. Plan discussed with CRNA. Pre Anesthesia Evaluation complete. Anesthesia plan, risks, benefits, alternatives, and personal involved discussed with patient. Patients and/or legal guardian verbalized an understanding  and agreed to proceed.   Britta Luna DO  6/17/2022

## 2022-06-17 ENCOUNTER — ANESTHESIA (OUTPATIENT)
Dept: ENDOSCOPY | Age: 37
End: 2022-06-17
Payer: COMMERCIAL

## 2022-06-17 ENCOUNTER — HOSPITAL ENCOUNTER (OUTPATIENT)
Age: 37
Setting detail: OUTPATIENT SURGERY
Discharge: HOME | End: 2022-06-17
Attending: SURGERY | Admitting: SURGERY
Payer: COMMERCIAL

## 2022-06-17 VITALS
TEMPERATURE: 97.1 F | OXYGEN SATURATION: 94 % | RESPIRATION RATE: 16 BRPM | WEIGHT: 293 LBS | SYSTOLIC BLOOD PRESSURE: 114 MMHG | DIASTOLIC BLOOD PRESSURE: 62 MMHG | HEART RATE: 81 BPM | BODY MASS INDEX: 48.82 KG/M2 | HEIGHT: 65 IN

## 2022-06-17 DIAGNOSIS — E66.01 MORBID OBESITY (HCC): ICD-10-CM

## 2022-06-17 LAB — PREGNANCY TEST URINE, POC: NEGATIVE

## 2022-06-17 PROCEDURE — 3700000001 HC ADD 15 MINUTES (ANESTHESIA): Performed by: SURGERY

## 2022-06-17 PROCEDURE — 7100000010 HC PHASE II RECOVERY - FIRST 15 MIN: Performed by: SURGERY

## 2022-06-17 PROCEDURE — 2580000003 HC RX 258: Performed by: ANESTHESIOLOGY

## 2022-06-17 PROCEDURE — 88305 TISSUE EXAM BY PATHOLOGIST: CPT

## 2022-06-17 PROCEDURE — 6360000002 HC RX W HCPCS

## 2022-06-17 PROCEDURE — 2709999900 HC NON-CHARGEABLE SUPPLY: Performed by: SURGERY

## 2022-06-17 PROCEDURE — 2500000003 HC RX 250 WO HCPCS

## 2022-06-17 PROCEDURE — 88342 IMHCHEM/IMCYTCHM 1ST ANTB: CPT

## 2022-06-17 PROCEDURE — 43239 EGD BIOPSY SINGLE/MULTIPLE: CPT | Performed by: SURGERY

## 2022-06-17 PROCEDURE — 81025 URINE PREGNANCY TEST: CPT

## 2022-06-17 PROCEDURE — 3609012400 HC EGD TRANSORAL BIOPSY SINGLE/MULTIPLE: Performed by: SURGERY

## 2022-06-17 PROCEDURE — 3700000000 HC ANESTHESIA ATTENDED CARE: Performed by: SURGERY

## 2022-06-17 PROCEDURE — 7100000011 HC PHASE II RECOVERY - ADDTL 15 MIN: Performed by: SURGERY

## 2022-06-17 RX ORDER — SODIUM CHLORIDE, SODIUM LACTATE, POTASSIUM CHLORIDE, CALCIUM CHLORIDE 600; 310; 30; 20 MG/100ML; MG/100ML; MG/100ML; MG/100ML
INJECTION, SOLUTION INTRAVENOUS CONTINUOUS
Status: DISCONTINUED | OUTPATIENT
Start: 2022-06-17 | End: 2022-06-17 | Stop reason: HOSPADM

## 2022-06-17 RX ORDER — KETAMINE HYDROCHLORIDE 10 MG/ML
INJECTION, SOLUTION INTRAMUSCULAR; INTRAVENOUS PRN
Status: DISCONTINUED | OUTPATIENT
Start: 2022-06-17 | End: 2022-06-17 | Stop reason: SDUPTHER

## 2022-06-17 RX ORDER — PROPOFOL 10 MG/ML
INJECTION, EMULSION INTRAVENOUS PRN
Status: DISCONTINUED | OUTPATIENT
Start: 2022-06-17 | End: 2022-06-17 | Stop reason: SDUPTHER

## 2022-06-17 RX ORDER — FAMOTIDINE 20 MG/1
20 TABLET, FILM COATED ORAL 2 TIMES DAILY
Qty: 60 TABLET | Refills: 3 | Status: SHIPPED | OUTPATIENT
Start: 2022-06-17 | End: 2022-07-12

## 2022-06-17 RX ORDER — LIDOCAINE HYDROCHLORIDE 20 MG/ML
INJECTION, SOLUTION INFILTRATION; PERINEURAL PRN
Status: DISCONTINUED | OUTPATIENT
Start: 2022-06-17 | End: 2022-06-17 | Stop reason: SDUPTHER

## 2022-06-17 RX ADMIN — LIDOCAINE HYDROCHLORIDE 200 MG: 20 INJECTION, SOLUTION INFILTRATION; PERINEURAL at 11:18

## 2022-06-17 RX ADMIN — SODIUM CHLORIDE, POTASSIUM CHLORIDE, SODIUM LACTATE AND CALCIUM CHLORIDE: 600; 310; 30; 20 INJECTION, SOLUTION INTRAVENOUS at 09:15

## 2022-06-17 RX ADMIN — PROPOFOL 600 MG: 10 INJECTION, EMULSION INTRAVENOUS at 11:18

## 2022-06-17 RX ADMIN — KETAMINE HYDROCHLORIDE 50 MG: 10 INJECTION INTRAMUSCULAR; INTRAVENOUS at 11:25

## 2022-06-17 ASSESSMENT — PAIN - FUNCTIONAL ASSESSMENT: PAIN_FUNCTIONAL_ASSESSMENT: 0-10

## 2022-06-17 NOTE — H&P
History and Physical Update    Original H&P done in office on 5/25/2022 (less than 30 days ago). Pt reports the following changes in health since being seen last: None    Vitals:    06/17/22 0908   BP: 130/67   Pulse: 68   Resp: 16   Temp: (!) 96 °F (35.6 °C)   SpO2: 98%       Alert and oriented x 3, no apparent distress at rest  Atraumatic, normocephalic. EOMI. Breathing unlabored. RRR. Soft, non-tender, non-distended. Moves all extremities. Warm, dry. Barbara Ortez is a 30 yo female here today for EGD    -Consent obtained in office.  -Reviewed expected pre-operative, operative, and post-operative courses. -Answered questions to patient's satisfaction.   -Reviewed risks, benefits, alternative to procedure.   -Proceed as scheduled. -The patient was counseled at length about the risks of kenneth Covid-19 during their perioperative period and any recovery window from their procedure. The patient was made aware that kenneth Covid-19  may worsen their prognosis for recovering from their procedure  and lend to a higher morbidity and/or mortality risk. All material risks, benefits, and reasonable alternatives including postponing the procedure were discussed. The patient does wish to proceed with the procedure at this time. TRICIA Jean-Baptiste - CNP    Agree with above. Pt evaluated. Here for EGD and bx prior to planned bariatric surgery. All questions answered. Proceed as scheduled.      Halley Leblanc MD

## 2022-06-17 NOTE — PROGRESS NOTES
[facility] Patient alert and oriented. Verified patient's name, , allergies and procedure. No beta blockers, no blood thinners, no implants. H&P on chart.

## 2022-06-17 NOTE — PROGRESS NOTES
1144 Pt. Brought back to unit, bedside report received from [facility]. Pt. Is A&O, VSS, pt. Denies any pain / discomfort. 1146 Education provided on use of call light, call light in reach. Family at bedside. 1148 Pt. Provided with beverages of choice and crackers. 1210 DC instructions reviewed with pt and mother, all parties express understanding. 1218 PT. To DC home in private vehicle.

## 2022-06-17 NOTE — ANESTHESIA POSTPROCEDURE EVALUATION
Department of Anesthesiology  Postprocedure Note    Patient: Brenden Poe  MRN: [id number]  YOB: 1985  Date of evaluation: 6/17/2022  Time:  11:41 AM     Procedure Summary     Date: 06/17/22 Room / Location: [address] [place] [address]    Anesthesia Start: 1114 Anesthesia Stop: 1014    Procedure: EGD ESOPHAGOGASTRODUODENOSCOPY WITH BX (N/A ) Diagnosis:       Morbid obesity ([address]      (Morbid obesity ([facility] [E66.01])    Surgeons: Marco Su MD Responsible Provider: Aram Almeida DO    Anesthesia Type: MAC ASA Status: 4          Anesthesia Type: No value filed. Frieda Phase I:      Frieda Phase II:      Last vitals: Reviewed and per EMR flowsheets.        Anesthesia Post Evaluation    Patient location during evaluation: bedside  Patient participation: complete - patient participated  Level of consciousness: awake  Pain score: 0  Airway patency: patent  Nausea & Vomiting: no vomiting and no nausea  Complications: no  Cardiovascular status: hemodynamically stable  Respiratory status: acceptable, airway suctioned, spontaneous ventilation and room air  Hydration status: stable

## 2022-06-17 NOTE — OP NOTE
PROCEDURE NOTE    DATE OF PROCEDURE: 6/17/2022     SURGEON: Gerda Molina MD , FACS    ASSISTANT: None    PREOPERATIVE DIAGNOSIS:  1. Morbid obesity    POSTOPERATIVE DIAGNOSIS:  1. Same as above       2. Gastritis       3. Hiatal hernia, mild    OPERATION: Esophagogastroduodenoscopy with biopsies    ANESTHESIA: Local monitored anesthesia    ESTIMATED BLOOD LOSS: None    COMPLICATIONS: None apparent    SPECIMENS: were obtained    HISTORY: The patient is a 31y.o. year old female with history of the above preoperative diagnosis. I recommended esophagogastroduodenoscopy with possible biopsy and I explained the risk, benefits, expected outcome, and alternatives to the procedure. Risks included but are not limited to bleeding, infection, respiratory distress, hypotension, and perforation of the esophagus, stomach, or duodenum. Patient understands and is in agreement, wishing to proceed. PROCEDURE: The patient was brought into the endoscopy suite and the appropriate monitors were connected to the patient. A timeout was held with all members of the procedure team present and in agreement. The patient was positioned in the left lateral decubitus position with a bite block in place. The endoscope was inserted into the patient's mouth and advanced from the oropharynx into the hypopharynx without difficulty and under direct vision. The scope was then advanced through the patient's esophagus under direct vision into the stomach, pylorus, and duodenum. A retroflexed view of the gastroesophageal junction was performed. Biopsies were taken. A summary of the findings are as follows:      Duodenum:     Descending: normal    Bulb: normal    Stomach:    Antrum: abnormal: gastritis, biopsied    Body: normal    Fundus: abnormal: small hiatal hernia    Esophagus: normal, GEJ ~ 40 cm from incisors    Larynx: normal    The stomach was desufflated and the endoscope was removed.  The patient tolerated the procedure well, and was transferred to the PACU following the procedure in stable condition. IMPRESSION/PLAN:   1. Small hiatal hernia  2. Gastritis  3. F/u biopsy results  4.  No anatomic contraindications to planned bariatric surgery    Electronically signed by Tom Trevino II, MD  on 6/17/2022 at 11:34 AM

## 2022-06-23 ENCOUNTER — OFFICE VISIT (OUTPATIENT)
Dept: BARIATRICS/WEIGHT MGMT | Age: 37
End: 2022-06-23
Payer: COMMERCIAL

## 2022-06-23 VITALS
HEART RATE: 100 BPM | SYSTOLIC BLOOD PRESSURE: 120 MMHG | DIASTOLIC BLOOD PRESSURE: 80 MMHG | WEIGHT: 293 LBS | BODY MASS INDEX: 48.82 KG/M2 | HEIGHT: 65 IN | OXYGEN SATURATION: 100 %

## 2022-06-23 DIAGNOSIS — E66.01 OBESITY, MORBID, BMI 50 OR HIGHER (HCC): ICD-10-CM

## 2022-06-23 DIAGNOSIS — A04.8 H. PYLORI INFECTION: Primary | ICD-10-CM

## 2022-06-23 DIAGNOSIS — Z01.818 PREOPERATIVE CLEARANCE: ICD-10-CM

## 2022-06-23 PROCEDURE — 99213 OFFICE O/P EST LOW 20 MIN: CPT | Performed by: NURSE PRACTITIONER

## 2022-06-23 RX ORDER — CLARITHROMYCIN 500 MG/1
500 TABLET, COATED ORAL 2 TIMES DAILY
Qty: 28 TABLET | Refills: 0 | Status: SHIPPED | OUTPATIENT
Start: 2022-06-23 | End: 2022-07-07

## 2022-06-23 RX ORDER — PANTOPRAZOLE SODIUM 40 MG/1
40 TABLET, DELAYED RELEASE ORAL 2 TIMES DAILY
Qty: 28 TABLET | Refills: 0 | Status: SHIPPED | OUTPATIENT
Start: 2022-06-23 | End: 2022-09-06

## 2022-06-23 RX ORDER — PHENTERMINE HYDROCHLORIDE 37.5 MG/1
37.5 TABLET ORAL
Qty: 30 TABLET | Refills: 0 | Status: SHIPPED | OUTPATIENT
Start: 2022-06-23 | End: 2022-07-23

## 2022-06-23 RX ORDER — AMOXICILLIN 500 MG/1
1000 CAPSULE ORAL 2 TIMES DAILY
Qty: 56 CAPSULE | Refills: 0 | Status: SHIPPED | OUTPATIENT
Start: 2022-06-23 | End: 2022-07-07

## 2022-06-23 ASSESSMENT — ENCOUNTER SYMPTOMS
WHEEZING: 0
DIARRHEA: 0
SHORTNESS OF BREATH: 0
PHOTOPHOBIA: 0
APNEA: 0
NAUSEA: 0
COLOR CHANGE: 0
CHEST TIGHTNESS: 0
SORE THROAT: 0
BACK PAIN: 0

## 2022-06-23 NOTE — PROGRESS NOTES
BARIATRIC SURGERY OFFICE PROGRESS NOTE    SUBJECTIVE:    Patient presenting today referred from Hanane White, for   Chief Complaint   Patient presents with    Weight Management     5th surg wm    . Vitals:    06/23/22 0936   BP:    Pulse: 100   SpO2:         BMI: Body mass index is 62.5 kg/m². Weight History: Wt Readings from Last 3 Encounters:   06/23/22 (!) 375 lb 9.6 oz (170.4 kg)   06/17/22 (!) 384 lb (174.2 kg)   06/15/22 (!) 379 lb (171.9 kg)     Rosalia Esteban is a 31 y.o. female presenting in fifth bariatric PRE-OP visit    Diet Recall: Total weight loss/gain: -3.6 lbs since last visit, and -29.1 lbs since starting program     Calories: tracking calories; 1400 a day  Water Intake: drinks around 100 ounces a day  Exercise: walking and resistance bands    New health problems: denies  New medications: denies    Clearances:  -- Cardiology: cleared  -- Pulmonology: cleared  -- Psychology: cleared    EGD completed. Final Pathologic Diagnosis:   Stomach, antrum, biopsy;   -  Chronic active gastritis. -  Helicobacter pylori microorganisms are identified. Thoroughly reviewed the patient's medical history, family history, social history and review of systems with the patient today in the office. Please see medical record for pertinent positives. Past Medical History:   Diagnosis Date    H/O Doppler echocardiogram 04/21/2022    EF 55-60%. MIld concentric LV hypertrophy. RVSP 35 mild PHTN.  History of exercise stress test 04/21/2022    ECG portion of stress test is negative for ischemia by diagnostic criteria. HTN respnse to exercise and frequent PVCs noted post exercise.  Norman score 3.    Hypertension     Sleep apnea         Patient Active Problem List   Diagnosis    Essential hypertension    Patient overweight    Polycystic ovary syndrome    Pneumonia due to COVID-19 virus    TENNILLE (obstructive sleep apnea)    Class 3 severe obesity due to excess calories without serious comorbidity with body mass index (BMI) of 60.0 to 69.9 in adult ([address]    Mild pulmonary hypertension (HCC)       Past Surgical History:   Procedure Laterality Date    ENDOSCOPY, COLON, DIAGNOSTIC      UPPER GASTROINTESTINAL ENDOSCOPY N/A 6/17/2022    EGD BIOPSY performed by Ju Phan MD at [facility] ENDOSCOPY       Current Outpatient Medications   Medication Sig Dispense Refill    amoxicillin (AMOXIL) 500 MG capsule Take 2 capsules by mouth 2 times daily for 14 days 56 capsule 0    clarithromycin (BIAXIN) 500 MG tablet Take 1 tablet by mouth 2 times daily for 14 days 28 tablet 0    pantoprazole (PROTONIX) 40 MG tablet Take 1 tablet by mouth 2 times daily for 14 days 28 tablet 0    phentermine (ADIPEX-P) 37.5 MG tablet Take 1 tablet by mouth every morning (before breakfast) for 30 days. BMI 44. 30 tablet 0    famotidine (PEPCID) 20 MG tablet Take 1 tablet by mouth 2 times daily 60 tablet 3    phentermine (ADIPEX-P) 37.5 MG tablet Take 1 tablet by mouth every morning (before breakfast) for 30 days. BMI 44. 30 tablet 0    valsartan-hydroCHLOROthiazide (DIOVAN HCT) 80-12.5 MG per tablet Take 1 tablet by mouth daily 30 tablet 3    CPAP Machine MISC 14 cm by CPAP route nightly      OXYGEN Inhale 2 L into the lungs nightly  (Patient not taking: Reported on 6/17/2022)      Collagen-Vitamin C-Biotin (COLLAGEN 1500/C PO) Take by mouth      ascorbic acid (VITAMIN C) 500 MG tablet Take 1 tablet by mouth daily 30 tablet 0    vitamin D 25 MCG (1000 UT) CAPS Take 2 capsules by mouth daily 30 capsule 0    Multiple Vitamins-Minerals (MULTIVITAMIN ADULT PO) Take by mouth daily       No current facility-administered medications for this visit. No Known Allergies      Review of Systems   Constitutional: Negative for fatigue and fever. HENT: Negative for congestion, dental problem and sore throat. Eyes: Negative for photophobia and visual disturbance.    Respiratory: Negative for apnea, chest tightness, shortness of breath and wheezing. Cardiovascular: Negative for chest pain and leg swelling. Gastrointestinal: Negative for diarrhea and nausea. Endocrine: Negative for cold intolerance and heat intolerance. Genitourinary: Negative for difficulty urinating, dysuria, flank pain, frequency and hematuria. Musculoskeletal: Negative for arthralgias and back pain. Skin: Negative for color change, rash and wound. Allergic/Immunologic: Negative for environmental allergies, food allergies and immunocompromised state. Neurological: Negative for dizziness, weakness, light-headedness and numbness. Hematological: Negative for adenopathy. Does not bruise/bleed easily. Psychiatric/Behavioral: Negative for behavioral problems, confusion, sleep disturbance and suicidal ideas. OBJECTIVE:    /80   Pulse 100   Ht 5' 5\" (1.651 m)   Wt (!) 375 lb 9.6 oz (170.4 kg)   SpO2 100%   BMI 62.50 kg/m²      Physical Exam  Vitals reviewed. Constitutional:       Appearance: She is obese. HENT:      Head: Normocephalic and atraumatic. Right Ear: External ear normal.      Left Ear: External ear normal.      Nose: Nose normal.      Mouth/Throat:      Mouth: Mucous membranes are moist.   Eyes:      Extraocular Movements: Extraocular movements intact. Pupils: Pupils are equal, round, and reactive to light. Cardiovascular:      Rate and Rhythm: Normal rate and regular rhythm. Pulses: Normal pulses. Heart sounds: Normal heart sounds. Pulmonary:      Effort: Pulmonary effort is normal.      Breath sounds: Normal breath sounds. Abdominal:      General: Bowel sounds are normal.   Musculoskeletal:         General: Normal range of motion. Cervical back: Normal range of motion and neck supple. Skin:     General: Skin is warm and dry. Neurological:      General: No focal deficit present. Mental Status: She is alert and oriented to person, place, and time. Mental status is at baseline. Psychiatric:         Mood and Affect: Mood normal.         Behavior: Behavior normal.       ASSESSMENT & PLAN:    1. H. pylori infection  - Treatment sent to pharmacy.  - Breath test ordered    2. Preoperative clearance  - All clearances completed  - EGD completed  - UDS completed  - Nicotine ordered  - Fee PIF. 3. Obesity, morbid, BMI 50 or higher ([facility]  -Patient was encouraged to journal all food intake.   -Keep calorie level at approximately 1400, per discussion / plan with registered dietician.  -Protein intake is to be a minimum of 60 grams per day. -Water drinking was encouraged with a goal of 64oz-128oz daily. Beverages are to be calorie free except for milk. Avoid soda. -Continue to increase level of physical activity.  - Adipex refill sent to pharmacy    This patient is a female of reproductive age and was advised she should not become pregnant during the bariatric workup process and for at least 12-18 months after surgery. The patient is agreeable to this plan. I spent 25 minutes with the patient face to face today and over 50% of the office visit today was spent in face to face counseling regarding diet and exercise, in preparation for her planned Robotic Sleeve Gastrectomy. Discussed in length complying with the dietary recommendations, complying with the preoperative workup including dietary counseling, exercise physiologist counseling, and pre-operative optimization of pulmonologist and cardiologist.    The patient expressed understanding and willingness to comply nicely; all questions and concerns addressed.     Orders Placed This Encounter   Medications    amoxicillin (AMOXIL) 500 MG capsule     Sig: Take 2 capsules by mouth 2 times daily for 14 days     Dispense:  56 capsule     Refill:  0    clarithromycin (BIAXIN) 500 MG tablet     Sig: Take 1 tablet by mouth 2 times daily for 14 days     Dispense:  28 tablet     Refill:  0    pantoprazole (PROTONIX) 40 MG tablet     Sig: Take 1 tablet by mouth 2 times daily for 14 days     Dispense:  28 tablet     Refill:  0    phentermine (ADIPEX-P) 37.5 MG tablet     Sig: Take 1 tablet by mouth every morning (before breakfast) for 30 days. BMI 44. Dispense:  30 tablet     Refill:  0     Orders Placed This Encounter   Procedures    H. Pylori Breath Test, Adult     Standing Status:   Future     Standing Expiration Date:   6/23/2023    Nicotine and Metabolites     Standing Status:   Future     Standing Expiration Date:   6/23/2023       Follow Up:  Return in about 1 month (around 7/23/2022) for weight check.     Lisa Parks, APRN - CNP

## 2022-06-30 ENCOUNTER — HOSPITAL ENCOUNTER (OUTPATIENT)
Age: 37
Discharge: HOME | End: 2022-06-30
Payer: COMMERCIAL

## 2022-06-30 PROCEDURE — 36415 COLL VENOUS BLD VENIPUNCTURE: CPT

## 2022-06-30 PROCEDURE — G0480 DRUG TEST DEF 1-7 CLASSES: HCPCS

## 2022-07-03 LAB
3-OH-COTININE URINE: <50 NG/ML
ANABASINE URINE: <5 NG/ML
COTININE, URINE: <15 NG/ML
NICOTINE AND METABOLITES: <15 NG/ML

## 2022-07-12 RX ORDER — FAMOTIDINE 20 MG/1
TABLET, FILM COATED ORAL
Qty: 60 TABLET | Refills: 3 | Status: ON HOLD | OUTPATIENT
Start: 2022-07-12 | End: 2022-09-13 | Stop reason: HOSPADM

## 2022-07-14 ENCOUNTER — HOSPITAL ENCOUNTER (OUTPATIENT)
Age: 37
Discharge: HOME | End: 2022-07-14
Payer: COMMERCIAL

## 2022-07-14 PROCEDURE — 83013 H PYLORI (C-13) BREATH: CPT

## 2022-07-15 LAB — H PYLORI BREATH TEST: NEGATIVE

## 2022-07-21 ENCOUNTER — OFFICE VISIT (OUTPATIENT)
Dept: BARIATRICS/WEIGHT MGMT | Age: 37
End: 2022-07-21
Payer: COMMERCIAL

## 2022-07-21 VITALS
OXYGEN SATURATION: 96 % | WEIGHT: 293 LBS | BODY MASS INDEX: 48.82 KG/M2 | DIASTOLIC BLOOD PRESSURE: 80 MMHG | SYSTOLIC BLOOD PRESSURE: 126 MMHG | HEART RATE: 100 BPM | HEIGHT: 65 IN

## 2022-07-21 DIAGNOSIS — Z01.818 PREOPERATIVE CLEARANCE: ICD-10-CM

## 2022-07-21 DIAGNOSIS — A04.8 H. PYLORI INFECTION: Primary | ICD-10-CM

## 2022-07-21 DIAGNOSIS — E66.01 OBESITY, MORBID, BMI 50 OR HIGHER (HCC): ICD-10-CM

## 2022-07-21 PROCEDURE — 99213 OFFICE O/P EST LOW 20 MIN: CPT | Performed by: NURSE PRACTITIONER

## 2022-07-21 ASSESSMENT — PATIENT HEALTH QUESTIONNAIRE - PHQ9
SUM OF ALL RESPONSES TO PHQ9 QUESTIONS 1 & 2: 0
SUM OF ALL RESPONSES TO PHQ QUESTIONS 1-9: 0
2. FEELING DOWN, DEPRESSED OR HOPELESS: 0
SUM OF ALL RESPONSES TO PHQ QUESTIONS 1-9: 0
1. LITTLE INTEREST OR PLEASURE IN DOING THINGS: 0

## 2022-07-21 ASSESSMENT — ENCOUNTER SYMPTOMS
SHORTNESS OF BREATH: 0
NAUSEA: 0
CHEST TIGHTNESS: 0
APNEA: 0
WHEEZING: 0
COLOR CHANGE: 0
DIARRHEA: 0
PHOTOPHOBIA: 0
BACK PAIN: 0
SORE THROAT: 0

## 2022-07-21 NOTE — PROGRESS NOTES
BARIATRIC SURGERY OFFICE PROGRESS NOTE    SUBJECTIVE:    Patient presenting today referred from Chrystal Briscoe, for   Chief Complaint   Patient presents with    Follow-up     6th surg wm    . Vitals:    07/21/22 1050   BP: 126/80   Pulse: 100   SpO2: 96%        BMI: Body mass index is 62.02 kg/m². Weight History: Wt Readings from Last 3 Encounters:   07/21/22 (!) 372 lb 11.2 oz (169.1 kg)   06/23/22 (!) 375 lb 9.6 oz (170.4 kg)   06/17/22 (!) 384 lb (174.2 kg)         Amilcar Thompson is a 31 y.o. female presenting in sixth bariatric PRE-OP visit    Diet Recall: Total weight loss/gain: -2.9 lbs since last visit, and -32 lbs since starting program     Calories: tracking calories for the most part  Water Intake: good  Exercise: tries to do something everyday    New health problems: denies  New medications:denies    Clearances:  -- Cardiology: cleared  -- Pulmonology: cleared  -- Psychology: cleared    H. Pylori treatment completed. Thoroughly reviewed the patient's medical history, family history, social history and review of systems with the patient today in the office. Please see medical record for pertinent positives. Past Medical History:   Diagnosis Date    H/O Doppler echocardiogram 04/21/2022    EF 55-60%. MIld concentric LV hypertrophy. RVSP 35 mild PHTN. History of exercise stress test 04/21/2022    ECG portion of stress test is negative for ischemia by diagnostic criteria. HTN respnse to exercise and frequent PVCs noted post exercise. Norman score 3.     Hypertension     Sleep apnea         Patient Active Problem List   Diagnosis    Essential hypertension    Patient overweight    Polycystic ovary syndrome    Pneumonia due to COVID-19 virus    TENNILLE (obstructive sleep apnea)    Class 3 severe obesity due to excess calories without serious comorbidity with body mass index (BMI) of 60.0 to 69.9 in adult [facility])    Mild pulmonary hypertension ([address]       Past Surgical History:   Procedure Laterality Date    ENDOSCOPY, COLON, DIAGNOSTIC      UPPER GASTROINTESTINAL ENDOSCOPY N/A 6/17/2022    EGD BIOPSY performed by Susan Meade MD at [address] [facility] ENDOSCOPY       Current Outpatient Medications   Medication Sig Dispense Refill    famotidine (PEPCID) 20 MG tablet TAKE 1 TABLET BY MOUTH TWICE A DAY 60 tablet 3    phentermine (ADIPEX-P) 37.5 MG tablet Take 1 tablet by mouth every morning (before breakfast) for 30 days. BMI 44. 30 tablet 0    valsartan-hydroCHLOROthiazide (DIOVAN HCT) 80-12.5 MG per tablet Take 1 tablet by mouth daily 30 tablet 3    CPAP Machine MISC 14 cm by CPAP route nightly      Collagen-Vitamin C-Biotin (COLLAGEN 1500/C PO) Take by mouth      ascorbic acid (VITAMIN C) 500 MG tablet Take 1 tablet by mouth daily 30 tablet 0    vitamin D 25 MCG (1000 UT) CAPS Take 2 capsules by mouth daily 30 capsule 0    Multiple Vitamins-Minerals (MULTIVITAMIN ADULT PO) Take by mouth daily      pantoprazole (PROTONIX) 40 MG tablet Take 1 tablet by mouth 2 times daily for 14 days (Patient not taking: Reported on 7/21/2022) 28 tablet 0    OXYGEN Inhale 2 L into the lungs nightly  (Patient not taking: No sig reported)       No current facility-administered medications for this visit. No Known Allergies      Review of Systems   Constitutional:  Negative for fatigue and fever. HENT:  Negative for congestion, dental problem and sore throat. Eyes:  Negative for photophobia and visual disturbance. Respiratory:  Negative for apnea, chest tightness, shortness of breath and wheezing. Cardiovascular:  Negative for chest pain and leg swelling. Gastrointestinal:  Negative for diarrhea and nausea. Endocrine: Negative for cold intolerance and heat intolerance. Genitourinary:  Negative for difficulty urinating, dysuria, flank pain, frequency and hematuria. Musculoskeletal:  Negative for arthralgias and back pain. Skin:  Negative for color change, rash and wound.    Allergic/Immunologic: Negative for environmental allergies, food allergies and immunocompromised state. Neurological:  Negative for dizziness, weakness, light-headedness and numbness. Hematological:  Negative for adenopathy. Does not bruise/bleed easily. Psychiatric/Behavioral:  Negative for behavioral problems, confusion, sleep disturbance and suicidal ideas. OBJECTIVE:    /80 (Site: Left Upper Arm, Position: Sitting, Cuff Size: Large Adult)   Pulse 100   Ht 5' 5\" (1.651 m)   Wt (!) 372 lb 11.2 oz (169.1 kg)   SpO2 96%   BMI 62.02 kg/m²      Physical Exam  Vitals reviewed. Constitutional:       Appearance: She is obese. HENT:      Head: Normocephalic and atraumatic. Right Ear: External ear normal.      Left Ear: External ear normal.      Nose: Nose normal.      Mouth/Throat:      Mouth: Mucous membranes are moist.   Eyes:      Extraocular Movements: Extraocular movements intact. Pupils: Pupils are equal, round, and reactive to light. Cardiovascular:      Rate and Rhythm: Normal rate and regular rhythm. Pulses: Normal pulses. Heart sounds: Normal heart sounds. Pulmonary:      Effort: Pulmonary effort is normal.      Breath sounds: Normal breath sounds. Abdominal:      General: Bowel sounds are normal.   Musculoskeletal:         General: Normal range of motion. Cervical back: Normal range of motion and neck supple. Skin:     General: Skin is warm and dry. Neurological:      General: No focal deficit present. Mental Status: She is alert and oriented to person, place, and time. Mental status is at baseline. Psychiatric:         Mood and Affect: Mood normal.         Behavior: Behavior normal.     ASSESSMENT & PLAN:    1. H. pylori infection  - antibiotic therapy completed  - breath test negative    2. Preoperative clearance  - all clearances completed  - EGD complete  - UDS/Nicotine complete  - File to be pulled and submitted to insurance    3.  Obesity, morbid, BMI 50 or higher [facility])  -Patient was encouraged to journal all food intake.   -Keep calorie level at approximately 1200, per discussion / plan with registered dietician.  -Protein intake is to be a minimum of 60 grams per day. -Water drinking was encouraged with a goal of 64oz-128oz daily. Beverages are to be calorie free except for milk. Avoid soda. -Continue to increase level of physical activity. This patient is a female of reproductive age and was advised she should not become pregnant during the bariatric workup process and for at least 12-18 months after surgery. The patient is agreeable to this plan. I spent 25 minutes with the patient face to face today and over 50% of the office visit today was spent in face to face counseling regarding diet and exercise, in preparation for her planned Robotic Sleeve Gastrectomy. Discussed in length complying with the dietary recommendations, complying with the preoperative workup including dietary counseling, exercise physiologist counseling, and pre-operative optimization of pulmonologist and cardiologist.    The patient expressed understanding and willingness to comply nicely; all questions and concerns addressed. No orders of the defined types were placed in this encounter. No orders of the defined types were placed in this encounter. Follow Up:  No follow-ups on file.     Yahir Quijano, APRN - CNP

## 2022-07-25 ENCOUNTER — TELEPHONE (OUTPATIENT)
Dept: BARIATRICS/WEIGHT MGMT | Age: 37
End: 2022-07-25

## 2022-08-08 ENCOUNTER — TELEPHONE (OUTPATIENT)
Dept: BARIATRICS/WEIGHT MGMT | Age: 37
End: 2022-08-08

## 2022-08-08 NOTE — TELEPHONE ENCOUNTER
Jill Eric #[id number]     8/8/22 thru 9/1322     CPT 29999     ICD 10 E66.01     [address] [facility] Inpatient 9/12/22

## 2022-08-08 NOTE — TELEPHONE ENCOUNTER
Scheduled Sleeve gastrectomy at [facility]  Surgery:  9/12/22 at 930 . Amina Parker Consent appt:  8/23/22 at 1045am  this will include ordered labs that must be done that day. PAT: by phone  Preop diet:  8/23/22 at 9 . Weigh in:  9/9/22 at 1130am  P/O appt:  9/22/22 at 9am  Prep: NPO after midnight. Hold Blood thinners if taking any  Called and spoke with George, gave dates and times.

## 2022-08-10 RX ORDER — VALSARTAN AND HYDROCHLOROTHIAZIDE 80; 12.5 MG/1; MG/1
TABLET, FILM COATED ORAL
Qty: 90 TABLET | Refills: 3 | Status: SHIPPED | OUTPATIENT
Start: 2022-08-10

## 2022-08-23 ENCOUNTER — HOSPITAL ENCOUNTER (OUTPATIENT)
Age: 37
Discharge: HOME | End: 2022-08-23
Payer: COMMERCIAL

## 2022-08-23 ENCOUNTER — OFFICE VISIT (OUTPATIENT)
Dept: BARIATRICS/WEIGHT MGMT | Age: 37
End: 2022-08-23
Payer: COMMERCIAL

## 2022-08-23 VITALS
SYSTOLIC BLOOD PRESSURE: 130 MMHG | HEART RATE: 88 BPM | OXYGEN SATURATION: 100 % | WEIGHT: 293 LBS | DIASTOLIC BLOOD PRESSURE: 84 MMHG | BODY MASS INDEX: 48.82 KG/M2 | HEIGHT: 65 IN

## 2022-08-23 VITALS
HEART RATE: 62 BPM | WEIGHT: 293 LBS | DIASTOLIC BLOOD PRESSURE: 80 MMHG | OXYGEN SATURATION: 97 % | HEIGHT: 65 IN | BODY MASS INDEX: 48.82 KG/M2 | SYSTOLIC BLOOD PRESSURE: 128 MMHG

## 2022-08-23 DIAGNOSIS — E55.9 VITAMIN D DEFICIENCY: ICD-10-CM

## 2022-08-23 DIAGNOSIS — Z01.818 PRE-OPERATIVE CLEARANCE: ICD-10-CM

## 2022-08-23 DIAGNOSIS — E66.01 OBESITY, MORBID, BMI 50 OR HIGHER (HCC): Primary | ICD-10-CM

## 2022-08-23 DIAGNOSIS — E66.01 MORBID OBESITY DUE TO EXCESS CALORIES (HCC): Primary | ICD-10-CM

## 2022-08-23 LAB
ALBUMIN SERPL-MCNC: 4.7 GM/DL (ref 3.4–5)
ALP BLD-CCNC: 88 IU/L (ref 40–128)
ALT SERPL-CCNC: 23 U/L (ref 10–40)
AMPHETAMINES: NEGATIVE
ANION GAP SERPL CALCULATED.3IONS-SCNC: 11 MMOL/L (ref 4–16)
AST SERPL-CCNC: 19 IU/L (ref 15–37)
BARBITURATE SCREEN URINE: NEGATIVE
BASOPHILS ABSOLUTE: 0 K/CU MM
BASOPHILS RELATIVE PERCENT: 0.3 % (ref 0–1)
BENZODIAZEPINE SCREEN, URINE: NEGATIVE
BILIRUB SERPL-MCNC: 0.5 MG/DL (ref 0–1)
BUN BLDV-MCNC: 18 MG/DL (ref 6–23)
CALCIUM SERPL-MCNC: 9.8 MG/DL (ref 8.3–10.6)
CANNABINOID SCREEN URINE: NEGATIVE
CHLORIDE BLD-SCNC: 99 MMOL/L (ref 99–110)
CO2: 29 MMOL/L (ref 21–32)
COCAINE METABOLITE: NEGATIVE
CREAT SERPL-MCNC: 0.8 MG/DL (ref 0.6–1.1)
DIFFERENTIAL TYPE: ABNORMAL
EOSINOPHILS ABSOLUTE: 0.1 K/CU MM
EOSINOPHILS RELATIVE PERCENT: 0.7 % (ref 0–3)
ESTIMATED AVERAGE GLUCOSE: 120 MG/DL
GFR AFRICAN AMERICAN: >60 ML/MIN/1.73M2
GFR NON-AFRICAN AMERICAN: >60 ML/MIN/1.73M2
GLUCOSE BLD-MCNC: 93 MG/DL (ref 70–99)
HBA1C MFR BLD: 5.8 % (ref 4.2–6.3)
HCT VFR BLD CALC: 45.4 % (ref 37–47)
HEMOGLOBIN: 14.8 GM/DL (ref 12.5–16)
IMMATURE NEUTROPHIL %: 0.7 % (ref 0–0.43)
INTERPRETATION: NORMAL
LYMPHOCYTES ABSOLUTE: 3 K/CU MM
LYMPHOCYTES RELATIVE PERCENT: 24.3 % (ref 24–44)
MCH RBC QN AUTO: 30.4 PG (ref 27–31)
MCHC RBC AUTO-ENTMCNC: 32.6 % (ref 32–36)
MCV RBC AUTO: 93.2 FL (ref 78–100)
MONOCYTES ABSOLUTE: 0.7 K/CU MM
MONOCYTES RELATIVE PERCENT: 5.4 % (ref 0–4)
NUCLEATED RBC %: 0 %
OPIATES, URINE: NEGATIVE
OXYCODONE: NEGATIVE
PDW BLD-RTO: 13.2 % (ref 11.7–14.9)
PHENCYCLIDINE, URINE: NEGATIVE
PLATELET # BLD: 313 K/CU MM (ref 140–440)
PMV BLD AUTO: 9.4 FL (ref 7.5–11.1)
POTASSIUM SERPL-SCNC: 4.6 MMOL/L (ref 3.5–5.1)
PREGNANCY, URINE: NEGATIVE
RBC # BLD: 4.87 M/CU MM (ref 4.2–5.4)
SEGMENTED NEUTROPHILS ABSOLUTE COUNT: 8.4 K/CU MM
SEGMENTED NEUTROPHILS RELATIVE PERCENT: 68.6 % (ref 36–66)
SODIUM BLD-SCNC: 139 MMOL/L (ref 135–145)
SPECIFIC GRAVITY, URINE: 1.01 (ref 1–1.03)
TOTAL IMMATURE NEUTOROPHIL: 0.09 K/CU MM
TOTAL NUCLEATED RBC: 0 K/CU MM
TOTAL PROTEIN: 7.3 GM/DL (ref 6.4–8.2)
WBC # BLD: 12.3 K/CU MM (ref 4–10.5)

## 2022-08-23 PROCEDURE — 85025 COMPLETE CBC W/AUTO DIFF WBC: CPT

## 2022-08-23 PROCEDURE — 99214 OFFICE O/P EST MOD 30 MIN: CPT | Performed by: SURGERY

## 2022-08-23 PROCEDURE — 80053 COMPREHEN METABOLIC PANEL: CPT

## 2022-08-23 PROCEDURE — 83036 HEMOGLOBIN GLYCOSYLATED A1C: CPT

## 2022-08-23 PROCEDURE — 81025 URINE PREGNANCY TEST: CPT

## 2022-08-23 PROCEDURE — 80307 DRUG TEST PRSMV CHEM ANLYZR: CPT

## 2022-08-23 PROCEDURE — 36415 COLL VENOUS BLD VENIPUNCTURE: CPT

## 2022-08-23 PROCEDURE — 99215 OFFICE O/P EST HI 40 MIN: CPT | Performed by: NURSE PRACTITIONER

## 2022-08-23 RX ORDER — SODIUM CHLORIDE 0.9 % (FLUSH) 0.9 %
5-40 SYRINGE (ML) INJECTION EVERY 12 HOURS SCHEDULED
Status: CANCELLED | OUTPATIENT
Start: 2022-08-23

## 2022-08-23 RX ORDER — ONDANSETRON 2 MG/ML
4 INJECTION INTRAMUSCULAR; INTRAVENOUS ONCE
Status: CANCELLED | OUTPATIENT
Start: 2022-08-23 | End: 2022-08-23

## 2022-08-23 RX ORDER — HEPARIN SODIUM 5000 [USP'U]/ML
5000 INJECTION, SOLUTION INTRAVENOUS; SUBCUTANEOUS ONCE
Status: CANCELLED | OUTPATIENT
Start: 2022-08-23 | End: 2022-08-23

## 2022-08-23 RX ORDER — SODIUM CHLORIDE 0.9 % (FLUSH) 0.9 %
5-40 SYRINGE (ML) INJECTION PRN
Status: CANCELLED | OUTPATIENT
Start: 2022-08-23

## 2022-08-23 RX ORDER — SODIUM CHLORIDE 9 MG/ML
INJECTION, SOLUTION INTRAVENOUS PRN
Status: CANCELLED | OUTPATIENT
Start: 2022-08-23

## 2022-08-23 RX ORDER — SCOLOPAMINE TRANSDERMAL SYSTEM 1 MG/1
1 PATCH, EXTENDED RELEASE TRANSDERMAL ONCE
Status: CANCELLED | OUTPATIENT
Start: 2022-08-23 | End: 2022-08-23

## 2022-08-23 RX ORDER — SODIUM CHLORIDE, SODIUM LACTATE, POTASSIUM CHLORIDE, CALCIUM CHLORIDE 600; 310; 30; 20 MG/100ML; MG/100ML; MG/100ML; MG/100ML
INJECTION, SOLUTION INTRAVENOUS CONTINUOUS
Status: CANCELLED | OUTPATIENT
Start: 2022-08-23

## 2022-08-23 ASSESSMENT — PATIENT HEALTH QUESTIONNAIRE - PHQ9
1. LITTLE INTEREST OR PLEASURE IN DOING THINGS: 0
SUM OF ALL RESPONSES TO PHQ QUESTIONS 1-9: 0
SUM OF ALL RESPONSES TO PHQ9 QUESTIONS 1 & 2: 0
SUM OF ALL RESPONSES TO PHQ QUESTIONS 1-9: 0
2. FEELING DOWN, DEPRESSED OR HOPELESS: 0

## 2022-08-23 NOTE — PROGRESS NOTES
Mirna Graves  1985  30 y.o. SUBJECT BINDU:  HPI: Mirna Graves presents today for PREOP DIET class by this provider. Patient has successfully met the criteria for weight loss surgery and is here for the weight management diet class. Patient agrees to follow the diet guidelines provided for successful weight loss. No new medications, recent illnesses or new medical history this appointment. Chief Complaint   Patient presents with    Weight Management     Weigh in      Past Medical History:   Diagnosis Date    H/O Doppler echocardiogram 04/21/2022    EF 55-60%. MIld concentric LV hypertrophy. RVSP 35 mild PHTN. History of exercise stress test 04/21/2022    ECG portion of stress test is negative for ischemia by diagnostic criteria. HTN respnse to exercise and frequent PVCs noted post exercise. Norman score 3.     Hypertension     Sleep apnea      Past Surgical History:   Procedure Laterality Date    ENDOSCOPY, COLON, DIAGNOSTIC      UPPER GASTROINTESTINAL ENDOSCOPY N/A 6/17/2022    EGD BIOPSY performed by Leanna Adam MD at [address] [facility] ENDOSCOPY     Current Outpatient Medications   Medication Sig Dispense Refill    valsartan-hydroCHLOROthiazide (DIOVAN-HCT) 80-12.5 MG per tablet TAKE 1 TABLET BY MOUTH EVERY DAY 90 tablet 3    famotidine (PEPCID) 20 MG tablet TAKE 1 TABLET BY MOUTH TWICE A DAY 60 tablet 3    pantoprazole (PROTONIX) 40 MG tablet Take 1 tablet by mouth 2 times daily for 14 days (Patient not taking: Reported on 7/21/2022) 28 tablet 0    CPAP Machine MISC 14 cm by CPAP route nightly      OXYGEN Inhale 2 L into the lungs nightly  (Patient not taking: No sig reported)      Collagen-Vitamin C-Biotin (COLLAGEN 1500/C PO) Take by mouth      ascorbic acid (VITAMIN C) 500 MG tablet Take 1 tablet by mouth daily 30 tablet 0    vitamin D 25 MCG (1000 UT) CAPS Take 2 capsules by mouth daily 30 capsule 0    Multiple Vitamins-Minerals (MULTIVITAMIN ADULT PO) Take by mouth daily       No current facility-administered medications for this visit. Family History   Problem Relation Age of Onset    High Cholesterol Mother     Cancer Father     Heart Disease Father     Heart Disease Maternal Grandfather     High Blood Pressure Maternal Grandfather     Diabetes Paternal Grandfather     Heart Disease Paternal Grandfather     High Blood Pressure Paternal Grandfather      No Known Allergies     OBJECTIVE:     /84   Pulse 88   Ht 5' 5\" (1.651 m)   Wt (!) 369 lb 8 oz (167.6 kg)   SpO2 100%   BMI 61.49 kg/m²   Wt Readings from Last 3 Encounters:   08/23/22 (!) 369 lb 8 oz (167.6 kg)   07/21/22 (!) 372 lb 11.2 oz (169.1 kg)   06/23/22 (!) 375 lb 9.6 oz (170.4 kg)       - Patient attended the surgical PREOP Diet group class today. - The purpose of the pre-surgery diet is designed to reduce body fat, preserve lean body mass, and prepare the body for post-surgery recovery. - Patient here to start 2 week liver shrinking diet in preparation for weight loss surgery on 9/12/22    - Patient instructed on pre-op diet and complete post-op diet progression; including tips for N/V, fluid and activity logs, recipes, and vitamins.    - Patient asked pertinent questions throughout session and answers given. - Patient informed that compliance generates a successful weight loss partnership.    - Patient voiced understanding of all information provided. - Patient is to refer to surgical handbook or to call office if questions arise.     - Will follow up in 2 weeks for weigh-in. Patient was seen with total face to face time of 40 minutes in a group setting. More than 50% of this visit was counseling on obesity, strict diet recommendations, nutrition and education as above in my assessment and plan section of my note.

## 2022-08-23 NOTE — PROGRESS NOTES
BARIATRIC SURGERY OFFICE PROGRESS NOTE    SUBJECTIVE:    Patient presenting today referred from Randy Mccoy for   Chief Complaint   Patient presents with    Weight Management     Consent appt. [id number] #[id number]   . Vitals:    08/23/22 1028   BP: 128/80   Pulse: 62   SpO2: 97%        BMI: Body mass index is 61.49 kg/m². Weight History: Wt Readings from Last 3 Encounters:   08/23/22 (!) 369 lb 8 oz (167.6 kg)   08/23/22 (!) 369 lb 8 oz (167.6 kg)   07/21/22 (!) 372 lb 11.2 oz (169.1 kg)        If within 30 days of bariatric surgery date, have you been to the ED: Zak Bryant is a 30 y.o. female presenting in  consent  bariatric PRE-OP visit. Total weight loss/gain:     -3.2 lbs since last visit. N/a lbs since surgery.    -35.2 lbs since starting program.    Changes in health since last visit: None. Pt tracking calories/protein: Yes. Pt exercising: Yes. Pt taking vitamins: Yes. Fluid intake: Appropriate. Past Medical History:   Diagnosis Date    H/O Doppler echocardiogram 04/21/2022    EF 55-60%. MIld concentric LV hypertrophy. RVSP 35 mild PHTN. History of exercise stress test 04/21/2022    ECG portion of stress test is negative for ischemia by diagnostic criteria. HTN respnse to exercise and frequent PVCs noted post exercise. Norman score 3.     Hypertension     Sleep apnea         Patient Active Problem List   Diagnosis    Essential hypertension    Patient overweight    Polycystic ovary syndrome    Pneumonia due to COVID-19 virus    TENNILLE (obstructive sleep apnea)    Class 3 severe obesity due to excess calories without serious comorbidity with body mass index (BMI) of 60.0 to 69.9 in adult [facility])    Mild pulmonary hypertension ([address]       Past Surgical History:   Procedure Laterality Date    ENDOSCOPY, COLON, DIAGNOSTIC      UPPER GASTROINTESTINAL ENDOSCOPY N/A 6/17/2022    EGD BIOPSY performed by Kitty Rai MD at [address] [facility] Medications   Medication Sig Dispense Refill    ZINC PO Take by mouth daily      valsartan-hydroCHLOROthiazide (DIOVAN-HCT) 80-12.5 MG per tablet TAKE 1 TABLET BY MOUTH EVERY DAY 90 tablet 3    famotidine (PEPCID) 20 MG tablet TAKE 1 TABLET BY MOUTH TWICE A DAY 60 tablet 3    CPAP Machine MISC 14 cm by CPAP route nightly      Collagen-Vitamin C-Biotin (COLLAGEN 1500/C PO) Take by mouth      ascorbic acid (VITAMIN C) 500 MG tablet Take 1 tablet by mouth daily 30 tablet 0    vitamin D 25 MCG (1000 UT) CAPS Take 2 capsules by mouth daily 30 capsule 0    Multiple Vitamins-Minerals (MULTIVITAMIN ADULT PO) Take by mouth daily      pantoprazole (PROTONIX) 40 MG tablet Take 1 tablet by mouth 2 times daily for 14 days (Patient not taking: Reported on 7/21/2022) 28 tablet 0    OXYGEN Inhale 2 L into the lungs nightly  (Patient not taking: No sig reported)       No current facility-administered medications for this visit. No Known Allergies      Review of Systems   Constitutional:  Positive for fatigue. Musculoskeletal:  Positive for arthralgias. All other systems reviewed and are negative. OBJECTIVE:    /80 (Site: Left Upper Arm, Position: Sitting, Cuff Size: Large Adult)   Pulse 62   Ht 5' 5\" (1.651 m)   Wt (!) 369 lb 8 oz (167.6 kg)   SpO2 97%   BMI 61.49 kg/m²      Physical Exam  Vitals reviewed. Constitutional:       Appearance: She is obese. HENT:      Head: Normocephalic and atraumatic. Right Ear: External ear normal.      Left Ear: External ear normal.      Nose: Nose normal.   Eyes:      General:         Right eye: No discharge. Left eye: No discharge. Extraocular Movements: Extraocular movements intact. Cardiovascular:      Rate and Rhythm: Normal rate. Pulmonary:      Effort: No respiratory distress. Abdominal:      Palpations: Abdomen is soft. Musculoskeletal:         General: No swelling. Skin:     General: Skin is warm.    Neurological:      Mental Status: She is alert. ASSESSMENT & PLAN:    1. Morbid obesity due to excess calories ([facility] [address]  -Approved for surgery  -Consent obtained. Reviewed in detail with the patient and/or family the expected pre-operative, operative, and post-operative courses including risks, benefits, and alternatives to the procedure. The patient's questions were answered in detail and agreed to proceed with the procedure.   -Orders placed    2. Vitamin D deficiency  -1000 UT CAPS PO daily  -Monitor after WLS    3. HTN  -Valsartan_HCTZ 80-12.5 PO daily. -Monitor for necessary changes after WLS. As of current visit, regarding obesity-related co-morbid conditions:  TENNILLE [] compliant [] no longer using [] resolved per sleep study; hypertension [] medications; hyperlipidemia [] medications; GERD [] medications; DM [] insulin [] non-insulin [] no meds       Patient was encouraged to track all PO intake. Calories should be approximately 1200, per discussion and plan with registered dietician. Protein intake is to be a minimum of 40-50 grams per day. Water drinking was encouraged with a goal of 64oz-128oz daily. Beverages are to be calorie free except for milk. Avoid soda and other carbonated beverages. Continue to increase level of physical activity. I spent 25 minutes with the patient face to face today and over 50% of the office visit today was spent in face to face counseling regarding diet and exercise, in preparation for her planned robotic-assisted laparoscopic sleeve gastrectomy. Discussed complying with the dietary recommendations, complying with the preoperative workup including dietary counseling , exercise physiologist (PT) counseling , and pre-operative optimization of pulmonologist  and cardiologist .    The patient expressed understanding and willingness to comply nicely; all questions and concerns addressed. No orders of the defined types were placed in this encounter.     No orders of the defined types were placed in this encounter. Follow Up:  No follow-ups on file.     Zackery Beltran MD

## 2022-09-06 NOTE — PROGRESS NOTES
Surgery @ [facility] on 9/12/22 you will be called 9/9/22 with times               1. Do not eat or drink anything after midnight - unless instructed by your doctor prior to surgery. This includes                   no water, chewing gum or mints. 2. Follow your directions as prescribed by the doctor for your procedure and medications. Take pepcid with a tiny sip of water the morning of surgery. 3. Check with your Doctor regarding stopping vitamins, supplements, blood thinners (Plavix, Coumadin, Lovenox, Effient, Pradaxa, Xarelto, Fragmin or                   other blood thinners) and follow their instructions. Stop vitamins, supplements and NSAIDS: 7 days prior to surgery. 4. Do not smoke, vape or use chewing tobacco morning of surgery. Do not drink any alcoholic beverages 24 hours prior to surgery. This includes NA Beer. No street drugs 7 days prior to surgery. 5. You may brush your teeth and gargle the morning of surgery. DO NOT SWALLOW WATER   6. You MUST make arrangements for a responsible adult to take you home after your surgery and be able to check on you every couple                   hours for the day. You will not be allowed to leave alone or drive yourself home. It is strongly suggested someone stay with you the first 24                   hrs. Your surgery will be cancelled if you do not have a ride home. 7. Please wear simple, loose fitting clothing to the hospital.  Nathalie Coma not bring valuables (money, credit cards, checkbooks, etc.) Do not wear any                   makeup (including no eye makeup) or nail polish on your fingers or toes. 8. DO NOT wear any jewelry or piercings on day of surgery. All body piercing jewelry must be removed. 9. If you have dentures, they will be removed before going to the OR; we will provide you a container. If you wear contact lenses or glasses,                  they will be removed; please bring a case for them. 10. If you  have a Living Will and Durable Power of  for Healthcare, please bring in a copy. 11. Please bring picture ID,  insurance card, paperwork from the doctors office    (H & P, Consent, & card for implantable devices). 12. Take a shower the morning of your procedure with Hibiclens or an anti-bacterial soap. Do not apply any make-up, deodorant, lotion, oil or powder. 13.  Enter thru the main entrance wearing a mask on the day of surgery. PAT assessment completed via phone. Pre-op instructions given. Pt. Stated had some chest congestion and did an at home covid test and that it was negative. She denies any other symptoms such as a fever. She states does have an appointment with her primary care doctor today and will be getting a covid test there. Informed her she would need to let Dr. Layton Pack office know if it was positive or if she felt like her symptoms were getting worse. She verbalized understanding. PAT number given.

## 2022-09-09 ENCOUNTER — OFFICE VISIT (OUTPATIENT)
Dept: BARIATRICS/WEIGHT MGMT | Age: 37
End: 2022-09-09
Payer: COMMERCIAL

## 2022-09-09 VITALS
WEIGHT: 293 LBS | DIASTOLIC BLOOD PRESSURE: 80 MMHG | SYSTOLIC BLOOD PRESSURE: 126 MMHG | OXYGEN SATURATION: 97 % | BODY MASS INDEX: 48.82 KG/M2 | HEIGHT: 65 IN | HEART RATE: 87 BPM

## 2022-09-09 DIAGNOSIS — E55.9 VITAMIN D DEFICIENCY: ICD-10-CM

## 2022-09-09 DIAGNOSIS — E66.01 OBESITY, MORBID, BMI 50 OR HIGHER (HCC): Primary | ICD-10-CM

## 2022-09-09 DIAGNOSIS — I10 ESSENTIAL HYPERTENSION: ICD-10-CM

## 2022-09-09 DIAGNOSIS — Z01.818 PRE-OP EVALUATION: ICD-10-CM

## 2022-09-09 PROCEDURE — 99213 OFFICE O/P EST LOW 20 MIN: CPT | Performed by: NURSE PRACTITIONER

## 2022-09-09 ASSESSMENT — PATIENT HEALTH QUESTIONNAIRE - PHQ9
1. LITTLE INTEREST OR PLEASURE IN DOING THINGS: 0
SUM OF ALL RESPONSES TO PHQ QUESTIONS 1-9: 0
SUM OF ALL RESPONSES TO PHQ9 QUESTIONS 1 & 2: 0
2. FEELING DOWN, DEPRESSED OR HOPELESS: 0

## 2022-09-09 ASSESSMENT — ENCOUNTER SYMPTOMS
EYES NEGATIVE: 1
GASTROINTESTINAL NEGATIVE: 1
ALLERGIC/IMMUNOLOGIC NEGATIVE: 1
RESPIRATORY NEGATIVE: 1

## 2022-09-09 NOTE — PROGRESS NOTES
Notified patient surgery @ [facility] on 9/12/22 @ 0930, arrival 0730. Understanding verbalized.

## 2022-09-09 NOTE — PROGRESS NOTES
Madhavi Jean  1985  30 y.o. Madhavi Jean is here for pre op weigh in. Patient was started on liver shrinking diet for two weeks ago resulting in weight loss of 10  pounds  and -45. 2 pounds overall. Current Body mass index is Body mass index is 59.82 kg/m². Emma Velez BMI started at 67.35    - All labs reviewed and questions answered. - Aware to continue liver shrinking diet until night before surgery and educated on post op diet stages. - Covid test on no longer necessary    SUBJECT BINDU:    Chief Complaint   Patient presents with    Weight Management     Weigh in, [address] 09/12/2022. Past Medical History:   Diagnosis Date    H/O Doppler echocardiogram 04/21/2022    EF 55-60%. MIld concentric LV hypertrophy. RVSP 35 mild PHTN. History of exercise stress test 04/21/2022    ECG portion of stress test is negative for ischemia by diagnostic criteria. HTN respnse to exercise and frequent PVCs noted post exercise. Norman score 3. Hypertension     Sleep apnea      Past Surgical History:   Procedure Laterality Date    ENDOSCOPY, COLON, DIAGNOSTIC      UPPER GASTROINTESTINAL ENDOSCOPY N/A 6/17/2022    EGD BIOPSY performed by Gladis George MD at [address] [place] \[facility]ospital [facility].\"" ENDOSCOPY     Current Outpatient Medications   Medication Sig Dispense Refill    ZINC PO Take by mouth daily      valsartan-hydroCHLOROthiazide (DIOVAN-HCT) 80-12.5 MG per tablet TAKE 1 TABLET BY MOUTH EVERY DAY 90 tablet 3    famotidine (PEPCID) 20 MG tablet TAKE 1 TABLET BY MOUTH TWICE A DAY 60 tablet 3    CPAP Machine MISC 14 cm by CPAP route nightly      Collagen-Vitamin C-Biotin (COLLAGEN 1500/C PO) Take by mouth      ascorbic acid (VITAMIN C) 500 MG tablet Take 1 tablet by mouth daily 30 tablet 0    vitamin D 25 MCG (1000 UT) CAPS Take 2 capsules by mouth daily 30 capsule 0    Multiple Vitamins-Minerals (MULTIVITAMIN ADULT PO) Take by mouth daily       No current facility-administered medications for this visit.      Family History   Problem Relation Age of Onset    High Cholesterol Mother     Cancer Father     Heart Disease Father     Heart Disease Maternal Grandfather     High Blood Pressure Maternal Grandfather     Diabetes Paternal Grandfather     Heart Disease Paternal Grandfather     High Blood Pressure Paternal Grandfather      No Known Allergies     Review of Systems   Constitutional: Negative. HENT: Negative. Eyes: Negative. Respiratory: Negative. Cardiovascular: Negative. Gastrointestinal: Negative. Endocrine: Negative. Genitourinary: Negative. Musculoskeletal: Negative. Skin: Negative. Allergic/Immunologic: Negative. Neurological: Negative. Hematological: Negative. Psychiatric/Behavioral: Negative. OBJECTIVE:     /80 (Site: Left Upper Arm, Position: Sitting, Cuff Size: Medium Adult)   Pulse 87   Ht 5' 5\" (1.651 m)   Wt (!) 359 lb 8 oz (163.1 kg)   SpO2 97%   BMI 59.82 kg/m²   Wt Readings from Last 3 Encounters:   09/09/22 (!) 359 lb 8 oz (163.1 kg)   08/23/22 (!) 369 lb 8 oz (167.6 kg)   08/23/22 (!) 369 lb 8 oz (167.6 kg)       Physical Exam  Constitutional:       Appearance: Normal appearance. She is obese. HENT:      Head: Normocephalic. Nose: Nose normal.      Mouth/Throat:      Pharynx: Oropharynx is clear. Eyes:      Conjunctiva/sclera: Conjunctivae normal.      Pupils: Pupils are equal, round, and reactive to light. Cardiovascular:      Rate and Rhythm: Normal rate. Pulses: Normal pulses. Pulmonary:      Effort: Pulmonary effort is normal.   Abdominal:      Palpations: Abdomen is soft. Musculoskeletal:         General: Normal range of motion. Cervical back: Normal range of motion. Skin:     General: Skin is warm and dry. Capillary Refill: Capillary refill takes less than 2 seconds. Neurological:      General: No focal deficit present. Mental Status: She is alert and oriented to person, place, and time.    Psychiatric:         Mood and Affect: Mood normal. Behavior: Behavior normal.         ASSESSMENT/ PLAN:    1. Obesity, morbid, BMI 50 or higher ([facility] [address]  - Continue with weight loss surgery as planned    2. Vitamin D deficiency  - Continue supplements  - Will monitor post-operatively    3. Essential hypertension  - Continue with diet and weight loss surgery  - Continue medications as prescribed    4. Pre-op evaluation  - Meets all criteria for weight loss surgery  - Scheduled for planned robot assisted sleeve gastrectomy on 9/12/22.   - Discussed pre surgical drink and to drink it at 8 pm the night before surgery  - NPO after midnight on the night before surgery  - Did well on 2 week pre op diet; down -10 lbs since starting and -45.2 overall  - BMI 59.82 now and started at 67.35.   - Labs reviewed. - Discussed surgery and post op course along with diet stages following surgery. - Discharge checklist provided and reviewed  - Questions answered  - Patient voiced understanding  - Call with any questions or concerns. No orders of the defined types were placed in this encounter. Return for initial post op visit.     TRICIA Pike - CNP

## 2022-09-10 ENCOUNTER — ANESTHESIA EVENT (OUTPATIENT)
Dept: OPERATING ROOM | Age: 37
DRG: 621 | End: 2022-09-10
Payer: COMMERCIAL

## 2022-09-10 NOTE — ANESTHESIA PRE PROCEDURE
Department of Anesthesiology  Preprocedure Note       Name:  Gladis Wallace   Age:  30 y.o.  :  1985                                          MRN:  [id number]         Date:  9/10/2022      Surgeon: Josh Bellamy):  MD Nicola Harrell II, MD    Procedure: Procedure(s):  GASTRECTOMY SLEEVE LAPAROSCOPIC ROBOTIC POSS HIATAL HERNIA  EGD BIOPSY    Medications prior to admission:   Prior to Admission medications    Medication Sig Start Date End Date Taking?  Authorizing Provider   ZINC PO Take by mouth daily    Historical Provider, MD   valsartan-hydroCHLOROthiazide (DIOVAN-HCT) 80-12.5 MG per tablet TAKE 1 TABLET BY MOUTH EVERY DAY 8/10/22   Monserrat Wilson MD   famotidine (PEPCID) 20 MG tablet TAKE 1 TABLET BY MOUTH TWICE A DAY 22   Nicola Wilhelm II, MD   CPAP Machine MISC 14 cm by CPAP route nightly    Historical Provider, MD   Collagen-Vitamin C-Biotin (COLLAGEN 1500/C PO) Take by mouth    Historical Provider, MD   ascorbic acid (VITAMIN C) 500 MG tablet Take 1 tablet by mouth daily 21   Rosario Medrano MD   vitamin D 25 MCG (1000 UT) CAPS Take 2 capsules by mouth daily 21   Rosario Medrano MD   Multiple Vitamins-Minerals (MULTIVITAMIN ADULT PO) Take by mouth daily    Historical Provider, MD       Current medications:    Current Outpatient Medications   Medication Sig Dispense Refill    ZINC PO Take by mouth daily      valsartan-hydroCHLOROthiazide (DIOVAN-HCT) 80-12.5 MG per tablet TAKE 1 TABLET BY MOUTH EVERY DAY 90 tablet 3    famotidine (PEPCID) 20 MG tablet TAKE 1 TABLET BY MOUTH TWICE A DAY 60 tablet 3    CPAP Machine MISC 14 cm by CPAP route nightly      Collagen-Vitamin C-Biotin (COLLAGEN 1500/C PO) Take by mouth      ascorbic acid (VITAMIN C) 500 MG tablet Take 1 tablet by mouth daily 30 tablet 0    vitamin D 25 MCG (1000 UT) CAPS Take 2 capsules by mouth daily 30 capsule 0    Multiple Vitamins-Minerals (MULTIVITAMIN ADULT PO) Take by mouth daily No current facility-administered medications for this visit. Allergies:  No Known Allergies    Problem List:    Patient Active Problem List   Diagnosis Code    Essential hypertension I10    Patient overweight E66.3    Polycystic ovary syndrome E28.2    Pneumonia due to COVID-19 virus U07.1, J12.82    TENNILLE (obstructive sleep apnea) G47.33    Class 3 severe obesity due to excess calories without serious comorbidity with body mass index (BMI) of 60.0 to 69.9 in adult ([facility] [address] E66.01, Z68.44    Mild pulmonary hypertension ([facility] [address] I27.20       Past Medical History:        Diagnosis Date    H/O Doppler echocardiogram 2022    EF 55-60%. MIld concentric LV hypertrophy. RVSP 35 mild PHTN.  History of exercise stress test 2022    ECG portion of stress test is negative for ischemia by diagnostic criteria. HTN respnse to exercise and frequent PVCs noted post exercise. Norman score 3.    Hypertension     Sleep apnea        Past Surgical History:        Procedure Laterality Date    ENDOSCOPY, COLON, DIAGNOSTIC      UPPER GASTROINTESTINAL ENDOSCOPY N/A 2022    EGD BI[facility]SY [facility] Gladis [facility] MD at [facility] ENDOSCOPY       Social History:    Social History     Tobacco Use    Smoking status: Former     Types: Cigarettes     Quit date: 2013     Years since quittin.2    Smokeless tobacco: Never   Substance Use Topics    Alcohol use: Yes     Comment: ocassionally                                 Counseling given: Not Answered      Vital Signs (Current): There were no vitals filed for this visit.                                            BP Readings from Last 3 Encounters:   22 126/80   22 128/80   22 130/84       NPO Status:                                                                                 BMI:   Wt Readings from Last 3 Encounters:   22 (!) 359 lb 8 oz (163.1 kg)   22 (!) 369 lb 8 oz (167.6 kg)   22 (!) 369 lb 8 oz (167.6 kg) hypertension:, pulmonary hypertension: mild,       ECG reviewed      Echocardiogram reviewed         Beta Blocker:  Not on Beta Blocker      ROS comment: Summary   Left ventricular systolic function is normal with an ejection fraction of   55-60%. Mild concentric left ventricular hypertrophy. No significant valvular disease noted. Right ventricular systolic pressure of 35 mmHg consistent with mild   pulmonary hypertension. No evidence of pericardial effusion. Signature      ------------------------------------------------------------------   Electronically signed by Guerrero Sebastian MD (Interpreting   physician) on 04/21/2022 at 03:05 PM    Summary   ECG portion of stress test is negative for ischemia by diagnostic criteria   but   Hypertensive response to exercise and Frequent PVC's noted post exercise   during recovery   Completed 4.9 METS and 3.19 Mins of exercise   Norman score of 3 ( Moderate Risk)      Signature      ------------------------------------------------------------------   Electronically signed by Krysta Perales MD (Interpreting   physician) on 04/21/2022 at 03:16 PM     Neuro/Psych:   Negative Neuro/Psych ROS              GI/Hepatic/Renal:   (+) morbid obesity (super morbid BMI 59.8)          Endo/Other:                      ROS comment: PCOS Abdominal:             Vascular: negative vascular ROS. Other Findings:             Anesthesia Plan      general     ASA 4       Induction: intravenous. MIPS: Postoperative opioids intended and Prophylactic antiemetics administered. Anesthetic plan and risks discussed with patient. Plan discussed with CRNA. Pre Anesthesia Evaluation complete. Anesthesia plan, risks, benefits, alternatives, and personal involved discussed with patient. Patients and/or legal guardian verbalized an understanding  and agreed to proceed.   Eduardo Huff DO  9/12/2022

## 2022-09-12 ENCOUNTER — HOSPITAL ENCOUNTER (INPATIENT)
Age: 37
LOS: 1 days | Discharge: HOME | DRG: 621 | End: 2022-09-13
Attending: SURGERY | Admitting: SURGERY
Payer: COMMERCIAL

## 2022-09-12 ENCOUNTER — ANESTHESIA (OUTPATIENT)
Dept: OPERATING ROOM | Age: 37
DRG: 621 | End: 2022-09-12
Payer: COMMERCIAL

## 2022-09-12 DIAGNOSIS — E66.01 MORBID OBESITY (HCC): ICD-10-CM

## 2022-09-12 LAB
GLUCOSE BLD-MCNC: 114 MG/DL (ref 70–99)
PREGNANCY TEST URINE, POC: NEGATIVE

## 2022-09-12 PROCEDURE — 2720000010 HC SURG SUPPLY STERILE: Performed by: SURGERY

## 2022-09-12 PROCEDURE — 88307 TISSUE EXAM BY PATHOLOGIST: CPT | Performed by: PATHOLOGY

## 2022-09-12 PROCEDURE — 3600000009 HC SURGERY ROBOT BASE: Performed by: SURGERY

## 2022-09-12 PROCEDURE — 94761 N-INVAS EAR/PLS OXIMETRY MLT: CPT

## 2022-09-12 PROCEDURE — 6360000002 HC RX W HCPCS

## 2022-09-12 PROCEDURE — 6360000002 HC RX W HCPCS: Performed by: ANESTHESIOLOGY

## 2022-09-12 PROCEDURE — 2700000000 HC OXYGEN THERAPY PER DAY

## 2022-09-12 PROCEDURE — 88342 IMHCHEM/IMCYTCHM 1ST ANTB: CPT | Performed by: PATHOLOGY

## 2022-09-12 PROCEDURE — 2500000003 HC RX 250 WO HCPCS

## 2022-09-12 PROCEDURE — 81025 URINE PREGNANCY TEST: CPT

## 2022-09-12 PROCEDURE — 2580000003 HC RX 258: Performed by: SURGERY

## 2022-09-12 PROCEDURE — 2500000003 HC RX 250 WO HCPCS: Performed by: SURGERY

## 2022-09-12 PROCEDURE — 82962 GLUCOSE BLOOD TEST: CPT

## 2022-09-12 PROCEDURE — 1200000000 HC SEMI PRIVATE

## 2022-09-12 PROCEDURE — 43775 LAP SLEEVE GASTRECTOMY: CPT | Performed by: NURSE PRACTITIONER

## 2022-09-12 PROCEDURE — 7100000001 HC PACU RECOVERY - ADDTL 15 MIN: Performed by: SURGERY

## 2022-09-12 PROCEDURE — 6370000000 HC RX 637 (ALT 250 FOR IP): Performed by: NURSE PRACTITIONER

## 2022-09-12 PROCEDURE — 7100000000 HC PACU RECOVERY - FIRST 15 MIN: Performed by: SURGERY

## 2022-09-12 PROCEDURE — APPNB180 APP NON BILLABLE TIME > 60 MINS: Performed by: NURSE PRACTITIONER

## 2022-09-12 PROCEDURE — 0DB64Z3 EXCISION OF STOMACH, PERCUTANEOUS ENDOSCOPIC APPROACH, VERTICAL: ICD-10-PCS | Performed by: SURGERY

## 2022-09-12 PROCEDURE — 43775 LAP SLEEVE GASTRECTOMY: CPT | Performed by: SURGERY

## 2022-09-12 PROCEDURE — 6360000002 HC RX W HCPCS: Performed by: SURGERY

## 2022-09-12 PROCEDURE — 3700000001 HC ADD 15 MINUTES (ANESTHESIA): Performed by: SURGERY

## 2022-09-12 PROCEDURE — 6370000000 HC RX 637 (ALT 250 FOR IP): Performed by: SURGERY

## 2022-09-12 PROCEDURE — 3600000019 HC SURGERY ROBOT ADDTL 15MIN: Performed by: SURGERY

## 2022-09-12 PROCEDURE — 3700000000 HC ANESTHESIA ATTENDED CARE: Performed by: SURGERY

## 2022-09-12 PROCEDURE — 2580000003 HC RX 258

## 2022-09-12 PROCEDURE — S2900 ROBOTIC SURGICAL SYSTEM: HCPCS | Performed by: SURGERY

## 2022-09-12 PROCEDURE — 8E0W4CZ ROBOTIC ASSISTED PROCEDURE OF TRUNK REGION, PERCUTANEOUS ENDOSCOPIC APPROACH: ICD-10-PCS | Performed by: SURGERY

## 2022-09-12 PROCEDURE — 2709999900 HC NON-CHARGEABLE SUPPLY: Performed by: SURGERY

## 2022-09-12 RX ORDER — OXYCODONE HYDROCHLORIDE 5 MG/1
5 TABLET ORAL
Status: DISCONTINUED | OUTPATIENT
Start: 2022-09-12 | End: 2022-09-12 | Stop reason: HOSPADM

## 2022-09-12 RX ORDER — DIPHENHYDRAMINE HYDROCHLORIDE 50 MG/ML
12.5 INJECTION INTRAMUSCULAR; INTRAVENOUS
Status: DISCONTINUED | OUTPATIENT
Start: 2022-09-12 | End: 2022-09-12 | Stop reason: HOSPADM

## 2022-09-12 RX ORDER — LIDOCAINE HYDROCHLORIDE 20 MG/ML
INJECTION, SOLUTION INTRAVENOUS PRN
Status: DISCONTINUED | OUTPATIENT
Start: 2022-09-12 | End: 2022-09-12 | Stop reason: SDUPTHER

## 2022-09-12 RX ORDER — SUCCINYLCHOLINE/SOD CL,ISO/PF 100 MG/5ML
SYRINGE (ML) INTRAVENOUS PRN
Status: DISCONTINUED | OUTPATIENT
Start: 2022-09-12 | End: 2022-09-12 | Stop reason: SDUPTHER

## 2022-09-12 RX ORDER — ONDANSETRON 2 MG/ML
4 INJECTION INTRAMUSCULAR; INTRAVENOUS ONCE
Status: DISCONTINUED | OUTPATIENT
Start: 2022-09-12 | End: 2022-09-12 | Stop reason: SDUPTHER

## 2022-09-12 RX ORDER — ONDANSETRON 2 MG/ML
4 INJECTION INTRAMUSCULAR; INTRAVENOUS EVERY 6 HOURS PRN
Status: DISCONTINUED | OUTPATIENT
Start: 2022-09-12 | End: 2022-09-13 | Stop reason: HOSPADM

## 2022-09-12 RX ORDER — VITAMIN B COMPLEX
2000 TABLET ORAL DAILY
Status: DISCONTINUED | OUTPATIENT
Start: 2022-09-13 | End: 2022-09-13 | Stop reason: HOSPADM

## 2022-09-12 RX ORDER — SODIUM CHLORIDE, SODIUM LACTATE, POTASSIUM CHLORIDE, CALCIUM CHLORIDE 600; 310; 30; 20 MG/100ML; MG/100ML; MG/100ML; MG/100ML
INJECTION, SOLUTION INTRAVENOUS CONTINUOUS
Status: DISCONTINUED | OUTPATIENT
Start: 2022-09-12 | End: 2022-09-12 | Stop reason: HOSPADM

## 2022-09-12 RX ORDER — LABETALOL HYDROCHLORIDE 5 MG/ML
10 INJECTION, SOLUTION INTRAVENOUS
Status: DISCONTINUED | OUTPATIENT
Start: 2022-09-12 | End: 2022-09-12 | Stop reason: HOSPADM

## 2022-09-12 RX ORDER — M-VIT,TX,IRON,MINS/CALC/FOLIC 27MG-0.4MG
1 TABLET ORAL DAILY
Status: DISCONTINUED | OUTPATIENT
Start: 2022-09-13 | End: 2022-09-13 | Stop reason: HOSPADM

## 2022-09-12 RX ORDER — FENTANYL CITRATE 50 UG/ML
INJECTION, SOLUTION INTRAMUSCULAR; INTRAVENOUS PRN
Status: DISCONTINUED | OUTPATIENT
Start: 2022-09-12 | End: 2022-09-12 | Stop reason: SDUPTHER

## 2022-09-12 RX ORDER — ONDANSETRON 2 MG/ML
INJECTION INTRAMUSCULAR; INTRAVENOUS PRN
Status: DISCONTINUED | OUTPATIENT
Start: 2022-09-12 | End: 2022-09-12 | Stop reason: SDUPTHER

## 2022-09-12 RX ORDER — SCOLOPAMINE TRANSDERMAL SYSTEM 1 MG/1
1 PATCH, EXTENDED RELEASE TRANSDERMAL ONCE
Status: DISCONTINUED | OUTPATIENT
Start: 2022-09-12 | End: 2022-09-12

## 2022-09-12 RX ORDER — KETOROLAC TROMETHAMINE 30 MG/ML
INJECTION, SOLUTION INTRAMUSCULAR; INTRAVENOUS PRN
Status: DISCONTINUED | OUTPATIENT
Start: 2022-09-12 | End: 2022-09-12 | Stop reason: SDUPTHER

## 2022-09-12 RX ORDER — PROPOFOL 10 MG/ML
INJECTION, EMULSION INTRAVENOUS PRN
Status: DISCONTINUED | OUTPATIENT
Start: 2022-09-12 | End: 2022-09-12 | Stop reason: SDUPTHER

## 2022-09-12 RX ORDER — DEXAMETHASONE SODIUM PHOSPHATE 4 MG/ML
INJECTION, SOLUTION INTRA-ARTICULAR; INTRALESIONAL; INTRAMUSCULAR; INTRAVENOUS; SOFT TISSUE PRN
Status: DISCONTINUED | OUTPATIENT
Start: 2022-09-12 | End: 2022-09-12 | Stop reason: SDUPTHER

## 2022-09-12 RX ORDER — HALOPERIDOL 5 MG/ML
1 INJECTION INTRAMUSCULAR
Status: DISCONTINUED | OUTPATIENT
Start: 2022-09-12 | End: 2022-09-12 | Stop reason: HOSPADM

## 2022-09-12 RX ORDER — FAMOTIDINE 20 MG/1
20 TABLET, FILM COATED ORAL 2 TIMES DAILY
Status: DISCONTINUED | OUTPATIENT
Start: 2022-09-13 | End: 2022-09-13 | Stop reason: HOSPADM

## 2022-09-12 RX ORDER — MIDAZOLAM HYDROCHLORIDE 1 MG/ML
INJECTION INTRAMUSCULAR; INTRAVENOUS PRN
Status: DISCONTINUED | OUTPATIENT
Start: 2022-09-12 | End: 2022-09-12 | Stop reason: SDUPTHER

## 2022-09-12 RX ORDER — SODIUM CHLORIDE, SODIUM LACTATE, POTASSIUM CHLORIDE, CALCIUM CHLORIDE 600; 310; 30; 20 MG/100ML; MG/100ML; MG/100ML; MG/100ML
INJECTION, SOLUTION INTRAVENOUS CONTINUOUS
Status: DISCONTINUED | OUTPATIENT
Start: 2022-09-12 | End: 2022-09-13 | Stop reason: HOSPADM

## 2022-09-12 RX ORDER — PROCHLORPERAZINE EDISYLATE 5 MG/ML
5 INJECTION INTRAMUSCULAR; INTRAVENOUS
Status: COMPLETED | OUTPATIENT
Start: 2022-09-12 | End: 2022-09-12

## 2022-09-12 RX ORDER — HYDROCODONE BITARTRATE AND ACETAMINOPHEN 5; 325 MG/1; MG/1
1 TABLET ORAL EVERY 6 HOURS PRN
Status: DISCONTINUED | OUTPATIENT
Start: 2022-09-12 | End: 2022-09-13 | Stop reason: HOSPADM

## 2022-09-12 RX ORDER — BUPIVACAINE HYDROCHLORIDE 5 MG/ML
INJECTION, SOLUTION EPIDURAL; INTRACAUDAL
Status: COMPLETED | OUTPATIENT
Start: 2022-09-12 | End: 2022-09-12

## 2022-09-12 RX ORDER — METOCLOPRAMIDE HYDROCHLORIDE 5 MG/ML
10 INJECTION INTRAMUSCULAR; INTRAVENOUS EVERY 6 HOURS PRN
Status: DISCONTINUED | OUTPATIENT
Start: 2022-09-12 | End: 2022-09-13 | Stop reason: HOSPADM

## 2022-09-12 RX ORDER — HYDRALAZINE HYDROCHLORIDE 20 MG/ML
10 INJECTION INTRAMUSCULAR; INTRAVENOUS
Status: DISCONTINUED | OUTPATIENT
Start: 2022-09-12 | End: 2022-09-12 | Stop reason: HOSPADM

## 2022-09-12 RX ORDER — ONDANSETRON 2 MG/ML
4 INJECTION INTRAMUSCULAR; INTRAVENOUS ONCE
Status: COMPLETED | OUTPATIENT
Start: 2022-09-12 | End: 2022-09-12

## 2022-09-12 RX ORDER — SODIUM CHLORIDE 9 MG/ML
INJECTION, SOLUTION INTRAVENOUS PRN
Status: DISCONTINUED | OUTPATIENT
Start: 2022-09-12 | End: 2022-09-12 | Stop reason: HOSPADM

## 2022-09-12 RX ORDER — MORPHINE SULFATE 2 MG/ML
2 INJECTION, SOLUTION INTRAMUSCULAR; INTRAVENOUS
Status: DISCONTINUED | OUTPATIENT
Start: 2022-09-12 | End: 2022-09-13 | Stop reason: HOSPADM

## 2022-09-12 RX ORDER — HEPARIN SODIUM 5000 [USP'U]/ML
5000 INJECTION, SOLUTION INTRAVENOUS; SUBCUTANEOUS ONCE
Status: COMPLETED | OUTPATIENT
Start: 2022-09-12 | End: 2022-09-12

## 2022-09-12 RX ORDER — POTASSIUM CHLORIDE 7.45 MG/ML
10 INJECTION INTRAVENOUS PRN
Status: DISCONTINUED | OUTPATIENT
Start: 2022-09-12 | End: 2022-09-13 | Stop reason: HOSPADM

## 2022-09-12 RX ORDER — MAGNESIUM SULFATE IN WATER 40 MG/ML
2000 INJECTION, SOLUTION INTRAVENOUS PRN
Status: DISCONTINUED | OUTPATIENT
Start: 2022-09-12 | End: 2022-09-13 | Stop reason: HOSPADM

## 2022-09-12 RX ORDER — IPRATROPIUM BROMIDE AND ALBUTEROL SULFATE 2.5; .5 MG/3ML; MG/3ML
1 SOLUTION RESPIRATORY (INHALATION)
Status: DISCONTINUED | OUTPATIENT
Start: 2022-09-12 | End: 2022-09-12 | Stop reason: HOSPADM

## 2022-09-12 RX ORDER — MORPHINE SULFATE 4 MG/ML
4 INJECTION, SOLUTION INTRAMUSCULAR; INTRAVENOUS
Status: DISCONTINUED | OUTPATIENT
Start: 2022-09-12 | End: 2022-09-13 | Stop reason: HOSPADM

## 2022-09-12 RX ORDER — POTASSIUM CHLORIDE 20 MEQ/1
40 TABLET, EXTENDED RELEASE ORAL PRN
Status: DISCONTINUED | OUTPATIENT
Start: 2022-09-12 | End: 2022-09-13 | Stop reason: HOSPADM

## 2022-09-12 RX ORDER — KETOROLAC TROMETHAMINE 30 MG/ML
30 INJECTION, SOLUTION INTRAMUSCULAR; INTRAVENOUS EVERY 6 HOURS
Status: DISCONTINUED | OUTPATIENT
Start: 2022-09-12 | End: 2022-09-13 | Stop reason: HOSPADM

## 2022-09-12 RX ORDER — MEPERIDINE HYDROCHLORIDE 25 MG/ML
6.25 INJECTION INTRAMUSCULAR; INTRAVENOUS; SUBCUTANEOUS EVERY 5 MIN PRN
Status: DISCONTINUED | OUTPATIENT
Start: 2022-09-12 | End: 2022-09-12 | Stop reason: HOSPADM

## 2022-09-12 RX ORDER — SODIUM CHLORIDE 0.9 % (FLUSH) 0.9 %
5-40 SYRINGE (ML) INJECTION PRN
Status: DISCONTINUED | OUTPATIENT
Start: 2022-09-12 | End: 2022-09-12 | Stop reason: HOSPADM

## 2022-09-12 RX ORDER — ROCURONIUM BROMIDE 10 MG/ML
INJECTION, SOLUTION INTRAVENOUS PRN
Status: DISCONTINUED | OUTPATIENT
Start: 2022-09-12 | End: 2022-09-12 | Stop reason: SDUPTHER

## 2022-09-12 RX ORDER — MIDAZOLAM HYDROCHLORIDE 2 MG/2ML
2 INJECTION, SOLUTION INTRAMUSCULAR; INTRAVENOUS
Status: DISCONTINUED | OUTPATIENT
Start: 2022-09-12 | End: 2022-09-12 | Stop reason: HOSPADM

## 2022-09-12 RX ORDER — FENTANYL CITRATE 50 UG/ML
50 INJECTION, SOLUTION INTRAMUSCULAR; INTRAVENOUS EVERY 5 MIN PRN
Status: COMPLETED | OUTPATIENT
Start: 2022-09-12 | End: 2022-09-12

## 2022-09-12 RX ORDER — ASCORBIC ACID 500 MG
500 TABLET ORAL DAILY
Status: DISCONTINUED | OUTPATIENT
Start: 2022-09-13 | End: 2022-09-13 | Stop reason: HOSPADM

## 2022-09-12 RX ORDER — ENOXAPARIN SODIUM 100 MG/ML
60 INJECTION SUBCUTANEOUS EVERY 12 HOURS SCHEDULED
Status: DISCONTINUED | OUTPATIENT
Start: 2022-09-12 | End: 2022-09-13 | Stop reason: HOSPADM

## 2022-09-12 RX ORDER — SODIUM CHLORIDE 0.9 % (FLUSH) 0.9 %
10 SYRINGE (ML) INJECTION PRN
Status: DISCONTINUED | OUTPATIENT
Start: 2022-09-12 | End: 2022-09-13 | Stop reason: HOSPADM

## 2022-09-12 RX ORDER — SODIUM CHLORIDE 0.9 % (FLUSH) 0.9 %
5-40 SYRINGE (ML) INJECTION EVERY 12 HOURS SCHEDULED
Status: DISCONTINUED | OUTPATIENT
Start: 2022-09-12 | End: 2022-09-13 | Stop reason: HOSPADM

## 2022-09-12 RX ORDER — VALSARTAN AND HYDROCHLOROTHIAZIDE 80; 12.5 MG/1; MG/1
1 TABLET, FILM COATED ORAL DAILY
Status: DISCONTINUED | OUTPATIENT
Start: 2022-09-12 | End: 2022-09-13 | Stop reason: HOSPADM

## 2022-09-12 RX ORDER — SODIUM CHLORIDE 0.9 % (FLUSH) 0.9 %
5-40 SYRINGE (ML) INJECTION EVERY 12 HOURS SCHEDULED
Status: DISCONTINUED | OUTPATIENT
Start: 2022-09-12 | End: 2022-09-12 | Stop reason: HOSPADM

## 2022-09-12 RX ORDER — SODIUM CHLORIDE 9 MG/ML
INJECTION, SOLUTION INTRAVENOUS PRN
Status: DISCONTINUED | OUTPATIENT
Start: 2022-09-12 | End: 2022-09-13 | Stop reason: HOSPADM

## 2022-09-12 RX ADMIN — ROCURONIUM BROMIDE 20 MG: 10 INJECTION, SOLUTION INTRAVENOUS at 09:47

## 2022-09-12 RX ADMIN — ROCURONIUM BROMIDE 50 MG: 10 INJECTION, SOLUTION INTRAVENOUS at 09:26

## 2022-09-12 RX ADMIN — DEXMEDETOMIDINE 12 MCG: 100 INJECTION, SOLUTION, CONCENTRATE INTRAVENOUS at 09:45

## 2022-09-12 RX ADMIN — CEFAZOLIN 2000 MG: 2 INJECTION, POWDER, FOR SOLUTION INTRAMUSCULAR; INTRAVENOUS at 16:08

## 2022-09-12 RX ADMIN — SODIUM CHLORIDE, POTASSIUM CHLORIDE, SODIUM LACTATE AND CALCIUM CHLORIDE: 600; 310; 30; 20 INJECTION, SOLUTION INTRAVENOUS at 20:43

## 2022-09-12 RX ADMIN — ONDANSETRON 4 MG: 2 INJECTION INTRAMUSCULAR; INTRAVENOUS at 08:36

## 2022-09-12 RX ADMIN — VALSARTAN AND HYDROCHLOROTHIAZIDE 1 TABLET: 80; 12.5 TABLET, FILM COATED ORAL at 21:34

## 2022-09-12 RX ADMIN — DEXAMETHASONE SODIUM PHOSPHATE 8 MG: 4 INJECTION, SOLUTION INTRAMUSCULAR; INTRAVENOUS at 09:30

## 2022-09-12 RX ADMIN — HYDROCODONE BITARTRATE AND ACETAMINOPHEN 1 TABLET: 5; 325 TABLET ORAL at 23:51

## 2022-09-12 RX ADMIN — FENTANYL CITRATE 100 MCG: 50 INJECTION, SOLUTION INTRAMUSCULAR; INTRAVENOUS at 09:21

## 2022-09-12 RX ADMIN — ENOXAPARIN SODIUM 60 MG: 100 INJECTION SUBCUTANEOUS at 21:34

## 2022-09-12 RX ADMIN — METOCLOPRAMIDE 10 MG: 5 INJECTION, SOLUTION INTRAMUSCULAR; INTRAVENOUS at 18:08

## 2022-09-12 RX ADMIN — KETOROLAC TROMETHAMINE 30 MG: 30 INJECTION, SOLUTION INTRAMUSCULAR; INTRAVENOUS at 18:08

## 2022-09-12 RX ADMIN — CEFAZOLIN 3000 MG: 1 INJECTION, POWDER, FOR SOLUTION INTRAMUSCULAR; INTRAVENOUS; PARENTERAL at 09:18

## 2022-09-12 RX ADMIN — FENTANYL CITRATE 50 MCG: 50 INJECTION, SOLUTION INTRAMUSCULAR; INTRAVENOUS at 11:07

## 2022-09-12 RX ADMIN — LIDOCAINE HYDROCHLORIDE 100 MG: 20 INJECTION, SOLUTION INTRAVENOUS at 09:21

## 2022-09-12 RX ADMIN — SODIUM CHLORIDE, POTASSIUM CHLORIDE, SODIUM LACTATE AND CALCIUM CHLORIDE: 600; 310; 30; 20 INJECTION, SOLUTION INTRAVENOUS at 08:32

## 2022-09-12 RX ADMIN — MIDAZOLAM 2 MG: 1 INJECTION INTRAMUSCULAR; INTRAVENOUS at 09:17

## 2022-09-12 RX ADMIN — HEPARIN SODIUM 5000 UNITS: 5000 INJECTION INTRAVENOUS; SUBCUTANEOUS at 08:26

## 2022-09-12 RX ADMIN — MORPHINE SULFATE 4 MG: 4 INJECTION, SOLUTION INTRAMUSCULAR; INTRAVENOUS at 15:59

## 2022-09-12 RX ADMIN — PROCHLORPERAZINE EDISYLATE 5 MG: 5 INJECTION INTRAMUSCULAR; INTRAVENOUS at 11:30

## 2022-09-12 RX ADMIN — Medication 200 MG: at 09:21

## 2022-09-12 RX ADMIN — ONDANSETRON 4 MG: 2 INJECTION INTRAMUSCULAR; INTRAVENOUS at 10:28

## 2022-09-12 RX ADMIN — ONDANSETRON 4 MG: 2 INJECTION INTRAMUSCULAR; INTRAVENOUS at 14:16

## 2022-09-12 RX ADMIN — FENTANYL CITRATE 50 MCG: 50 INJECTION, SOLUTION INTRAMUSCULAR; INTRAVENOUS at 12:19

## 2022-09-12 RX ADMIN — ONDANSETRON 4 MG: 2 INJECTION INTRAMUSCULAR; INTRAVENOUS at 15:59

## 2022-09-12 RX ADMIN — SODIUM CHLORIDE, POTASSIUM CHLORIDE, SODIUM LACTATE AND CALCIUM CHLORIDE: 600; 310; 30; 20 INJECTION, SOLUTION INTRAVENOUS at 11:35

## 2022-09-12 RX ADMIN — DEXMEDETOMIDINE 12 MCG: 100 INJECTION, SOLUTION, CONCENTRATE INTRAVENOUS at 09:41

## 2022-09-12 RX ADMIN — KETOROLAC TROMETHAMINE 30 MG: 30 INJECTION, SOLUTION INTRAMUSCULAR; INTRAVENOUS at 10:32

## 2022-09-12 RX ADMIN — KETOROLAC TROMETHAMINE 30 MG: 30 INJECTION, SOLUTION INTRAMUSCULAR; INTRAVENOUS at 23:51

## 2022-09-12 RX ADMIN — SUGAMMADEX 330 MG: 100 INJECTION, SOLUTION INTRAVENOUS at 10:32

## 2022-09-12 RX ADMIN — DEXMEDETOMIDINE 12 MCG: 100 INJECTION, SOLUTION, CONCENTRATE INTRAVENOUS at 09:48

## 2022-09-12 RX ADMIN — PROPOFOL 200 MG: 10 INJECTION, EMULSION INTRAVENOUS at 09:21

## 2022-09-12 ASSESSMENT — PAIN SCALES - GENERAL
PAINLEVEL_OUTOF10: 2
PAINLEVEL_OUTOF10: 3
PAINLEVEL_OUTOF10: 6
PAINLEVEL_OUTOF10: 8
PAINLEVEL_OUTOF10: 8
PAINLEVEL_OUTOF10: 7
PAINLEVEL_OUTOF10: 7

## 2022-09-12 ASSESSMENT — PAIN DESCRIPTION - LOCATION
LOCATION: ABDOMEN

## 2022-09-12 ASSESSMENT — ENCOUNTER SYMPTOMS
ABDOMINAL PAIN: 1
VOMITING: 0
SHORTNESS OF BREATH: 0
COUGH: 0
NAUSEA: 0

## 2022-09-12 ASSESSMENT — PAIN DESCRIPTION - FREQUENCY
FREQUENCY: INTERMITTENT
FREQUENCY: CONTINUOUS

## 2022-09-12 ASSESSMENT — PAIN DESCRIPTION - ORIENTATION
ORIENTATION: RIGHT;LEFT;LOWER;UPPER
ORIENTATION: LOWER
ORIENTATION: RIGHT;LEFT;LOWER

## 2022-09-12 ASSESSMENT — PAIN - FUNCTIONAL ASSESSMENT
PAIN_FUNCTIONAL_ASSESSMENT: PREVENTS OR INTERFERES SOME ACTIVE ACTIVITIES AND ADLS
PAIN_FUNCTIONAL_ASSESSMENT: ACTIVITIES ARE NOT PREVENTED
PAIN_FUNCTIONAL_ASSESSMENT: ACTIVITIES ARE NOT PREVENTED
PAIN_FUNCTIONAL_ASSESSMENT: 0-10

## 2022-09-12 ASSESSMENT — PAIN DESCRIPTION - PAIN TYPE
TYPE: SURGICAL PAIN
TYPE: SURGICAL PAIN

## 2022-09-12 ASSESSMENT — PAIN DESCRIPTION - DESCRIPTORS
DESCRIPTORS: ACHING
DESCRIPTORS: THROBBING;TIGHTNESS
DESCRIPTORS: DISCOMFORT

## 2022-09-12 ASSESSMENT — PAIN DESCRIPTION - ONSET
ONSET: ON-GOING
ONSET: ON-GOING

## 2022-09-12 NOTE — H&P
V2.0  [facility] Consult Note      Name:  Krzysztof Longoria /Age/Sex: 1985  (30 y.o. female)   MRN & CSN:  [id number] & [id number] Encounter Date/Time: 2022 6:31 PM EDT   Location:  [facility] PCP: Denisha Conner MD  Consulting Provider: Leonardo Dior, [address] [facility] Day: 1    Assessment and Recommendations   Krzysztof Longoria is a 30 y.o. female with pmh of morbid obesity, hypertension, sleep apnea who presents with morbid obesity for bariatric surgery consult by Dr. Stella Hobbs    * (Principal) [facility] 2022 Yes       Recommendations: Morbid obesity BMI 60.31/pain bariatric surgery-robot-assisted laparoscopic sleeve gastrectomy  -Admitting service surgery  -Postop orders as per surgery pathway  -Analgesics antiemetics  -Continue incentive spirometer every 2 hours while awake  -Postop bariatric diet  -CBC and BMP in a.m. Essential hypertension: Managed on losartan/HCTZ  -Continue PTA regimen  -Monitor blood pressure trends  -First dose this evening  Obstructive sleep apnea: On CPAP at at bedtime  -Continue home CPAP regimen, respiratory per protocol      Diet BARIATRIC DIET; Bariatric Clear Liquid   DVT Prophylaxis [x] Lovenox, []  Heparin, [] SCDs, [] Ambulation,  [] Eliquis, [] Xarelto   Code Status Full Code   Surrogate Decision Maker/ POA Spouse     History Obtained From:    patient    History of Present Illness:     Chief Complaint: Morbid obesity ([facility]    Krzysztof Longoria is a 30 y.o. female who is seen today by the hospitalist team at the request of Federico Rainey, with a Chief Complaint of postop management. Patient was visited at bedside she is sitting up in chair in room states she is doing well was able to tolerate some water and some broth with dinner. She does complain of abdominal pain after surgery but is controlled. She denies any new concerns or questions.   Family members are at bedside. Review of Systems:    Review of Systems   Constitutional:  Negative for chills and fever. HENT:  Negative for congestion. Respiratory:  Negative for cough and shortness of breath. Cardiovascular:  Negative for chest pain. Gastrointestinal:  Positive for abdominal pain. Negative for nausea and vomiting. Feels tender after surgery   Genitourinary: Negative. Musculoskeletal: Negative. Skin: Negative. Neurological: Negative. Psychiatric/Behavioral: Negative. Objective: Intake/Output Summary (Last 24 hours) at 9/12/2022 1831  Last data filed at 9/12/2022 1033  Gross per 24 hour   Intake 900 ml   Output 30 ml   Net 870 ml      Vitals:   Vitals:    09/12/22 1219 09/12/22 1230 09/12/22 1521 09/12/22 1538   BP:  (!) 141/80 (!) 166/97    Pulse:  61 63 59   Resp: 18  18    Temp:   97.7 °F (36.5 °C)    TempSrc:   Oral    SpO2:  92% 98%    Weight:       Height:           Medications Prior to Admission     Prior to Admission medications    Medication Sig Start Date End Date Taking?  Authorizing Provider   ZINC PO Take by mouth daily    Historical Provider, MD   valsartan-hydroCHLOROthiazide (DIOVAN-HCT) 80-12.5 MG per tablet TAKE 1 TABLET BY MOUTH EVERY DAY 8/10/22   Nela Segura MD   famotidine (PEPCID) 20 MG tablet TAKE 1 TABLET BY MOUTH TWICE A DAY 7/12/22   Panfilo Mei II, MD   CPAP Machine MISC 14 cm by CPAP route nightly    Historical Provider, MD   Collagen-Vitamin C-Biotin (COLLAGEN 1500/C PO) Take by mouth    Historical Provider, MD   ascorbic acid (VITAMIN C) 500 MG tablet Take 1 tablet by mouth daily 11/28/21   Ruth Griffith MD   vitamin D 25 MCG (1000 UT) CAPS Take 2 capsules by mouth daily 11/28/21   Ruth Griffith MD   Multiple Vitamins-Minerals (MULTIVITAMIN ADULT PO) Take by mouth daily    Historical Provider, MD       Physical Exam: Need 8 Elements   General: NAD  Eyes: EOMI  ENT: neck supple  Cardiovascular: Regular rate and rhythm  Respiratory: Clear to auscultation bilaterally, respirations even and unlabored RA  Gastrointestinal: abdomen soft, tender to touch  Genitourinary: no suprapubic tenderness  Musculoskeletal: No edema  Skin: warm, dry  Neuro: Alert. Psych: Mood appropriate. Past Medical History:   PMHx   Past Medical History:   Diagnosis Date    H/O Doppler echocardiogram 2022    EF 55-60%. MIld concentric LV hypertrophy. RVSP 35 mild PHTN. History of exercise stress test 2022    ECG portion of stress test is negative for ischemia by diagnostic criteria. HTN respnse to exercise and frequent PVCs noted post exercise. Norman score 3. Hypertension     Sleep apnea      PSHX:  has a past surgical history that includes Endoscopy, colon, diagnostic; Upper gastrointestinal endoscopy (N/A, 2022); and Bariatric Surgery (2022). Allergies: No Known Allergies  Fam HX: family history includes Cancer in her father; Diabetes in her paternal grandfather; Heart Disease in her father, maternal grandfather, and paternal grandfather; High Blood Pressure in her maternal grandfather and paternal grandfather; High Cholesterol in her mother.   Soc HX:   Social History     Socioeconomic History    Marital status:      Spouse name: None    Number of children: None    Years of education: None    Highest education level: None   Tobacco Use    Smoking status: Former     Types: Cigarettes     Quit date: 2013     Years since quittin.2    Smokeless tobacco: Never   Vaping Use    Vaping Use: Never used   Substance and Sexual Activity    Alcohol use: Yes     Comment: ocassionally     Drug use: Never       Medications:   Medications:    ketorolac  30 mg IntraVENous Q6H    sodium chloride flush  5-40 mL IntraVENous 2 times per day    ceFAZolin  2,000 mg IntraVENous Q8H    enoxaparin  60 mg SubCUTAneous 2 times per day    [START ON 2022] ascorbic acid  500 mg Oral Daily    [START ON 2022] famotidine  20 mg Oral BID    [START ON 9/13/2022] therapeutic multivitamin-minerals  1 tablet Oral Daily    valsartan-hydroCHLOROthiazide  1 tablet Oral Daily    [START ON 9/13/2022] Vitamin D  2,000 Units Oral Daily      Infusions:    sodium chloride      lactated ringers 125 mL/hr at 09/12/22 1135     PRN Meds: HYDROcodone-acetaminophen, 1 tablet, Q6H PRN  sodium chloride flush, 10 mL, PRN  sodium chloride, , PRN  potassium chloride, 40 mEq, PRN   Or  potassium alternative oral replacement, 40 mEq, PRN   Or  potassium chloride, 10 mEq, PRN  magnesium sulfate, 2,000 mg, PRN  morphine, 2 mg, Q2H PRN   Or  morphine, 4 mg, Q2H PRN  ondansetron, 4 mg, Q6H PRN  metoclopramide, 10 mg, Q6H PRN        Labs and Imaging   No results found. Labs planned for AM.     CBC: No results for input(s): WBC, HGB, PLT in the last 72 hours. BMP:  No results for input(s): NA, K, CL, CO2, BUN, CREATININE, GLUCOSE in the last 72 hours. Hepatic: No results for input(s): AST, ALT, ALB, BILITOT, ALKPHOS in the last 72 hours. Lipids:   Lab Results   Component Value Date/Time    CHOL 195 02/21/2022 07:54 AM    HDL 38 02/21/2022 07:54 AM    TRIG 155 02/21/2022 07:54 AM     Hemoglobin A1C:   Lab Results   Component Value Date/Time    LABA1C 5.8 08/23/2022 11:46 AM     TSH: No results found for: TSH  Troponin:   Lab Results   Component Value Date/Time    TROPONINT <0.010 11/23/2021 01:42 PM    TROPONINT <0.010 11/21/2021 12:30 PM     Lactic Acid: No results for input(s): LACTA in the last 72 hours. BNP: No results for input(s): PROBNP in the last 72 hours.   UA:  Lab Results   Component Value Date/Time    NITRU NEGATIVE 07/02/2016 09:15 AM    COLORU YELLOW 07/02/2016 09:15 AM    WBCUA NEGATIVE 07/02/2016 09:15 AM    RBCUA NEGATIVE 07/02/2016 09:15 AM    MUCUS 2+ 06/02/2011 05:23 PM    BACTERIA MODERATE 07/02/2016 09:15 AM    CLARITYU SL HAZY 07/02/2016 09:15 AM    SPECGRAV 1.032 07/02/2016 09:15 AM    LEUKOCYTESUR NEGATIVE 07/02/2016 09:15 AM UROBILINOGEN 0.2 07/02/2016 09:15 AM    BILIRUBINUR NEGATIVE 07/02/2016 09:15 AM    BLOODU NEGATIVE 07/02/2016 09:15 AM    KETUA NEGATIVE 07/02/2016 09:15 AM     Urine Cultures: No results found for: Sachi Bile  Blood Cultures: No results found for: BC  No results found for: BLOODCULT2  Organism: No results found for: [facility]      Electronically signed by Vista Oppenheim, APRN - CNP on 9/12/2022 at 6:31 PM

## 2022-09-12 NOTE — OP NOTE
Operative Report    Patient: Beatriz Oden  YOB: 1985  MRN: [id number]    Date of Surgery:  09/12/22    Surgeon:  Lori Taylor MD, [facility], [facility]    Assistant(s):  Piedad Joya, [address] [place] [address] skilled assistance of the CNP was necessary for the successful completion of this case. She was essential for the proper positioning, manipulation of instruments, proper exposure, manipulation of tissue, and wound closure. Preoperative Diagnosis: 1. Morbid obesity      2. Hypertension      3. Obstructive sleep apnea    Postoperative Diagnosis:  1. Same as above    Procedure(s) Performed:  1. Robotic-assisted laparoscopic sleeve gastrectomy    IVF: 800 mL crystalloid    Estimated Blood Loss: Less than 30 mL    Anesthesia: General endotracheal    Specimen(s):  1. Sleeve stomach    Drain(s): None    Findings: Consistent with postoperative diagnosis    Complications: None apparent    Indication for Procedure: This patient is a 30 y.o. female with a history of morbid obesity. The patient currently has a BMI of 60.31. The patient attended an informational seminar for bariatric surgery, was determined to be an appropriate candidate, and underwent an extensive workup prior to being approved for surgery. The patient made the decision to undergo the above listed procedure(s). All of the patient's questions related to the surgery--preoperative, operative, and postoperative--were answered previously. An informed consent was obtained after discussing in detail with the patient the risks, benefits, and alternatives to surgery. Description of Procedure: On 09/12/22, the patient was brought to the operating room from the preoperative holding area. In the preoperative holding area, the patient received heparin. The patient was placed on the operating room table in the supine position. The patient was secured to the operating room table using multiple straps and a foot board.  All pressure points were well-padded. The patient was intubated endotracheally after SCD boots were placed bilaterally. The patient received IV antibiotics in accordance with national protocol. The abdomen was then prepped and draped in a standard fashion. An approved timeout was held with all members of the operating room team present and in agreement. Using an #11 blade, a 5 mm incision was made in the left lower mid abdomen. The abdomen was entered under direct vision using a 5 mm 0 degree laparoscope housed in a 5 mm optical trocar. Pneumoperitoneum was established using CO2 to 15 mm Hg. Once inside the abdominal cavity, an inspection ensued. The remaining ports were placed under direct vision. The ports placed were the following sizes and locations: three 8 mm ports across the mid abdomen in a lateral orientation, the initial 5 mm port was upsized to an 8 mm port, a 12 mm assistant port in the right lower mid abdomen, and a 5 mm liver retractor port in the subxiphoid location. There was no injury to any intra-abdominal structures during port placement. The robot was brought to the operating room table on the patient's left side and docked. The patient was placed in the steep reverse Trendelenburg. A liver retractor was secured to the table and passed into the abdominal cavity. The liver was retracted cephalad and anteriorly, exposing the esophageal hiatus. The anterior gastric fat pad was identified. The angle of His was bluntly mobilized. The esophageal hiatus was inspected and it was determined that there was no evidence of an hiatal hernia. Attention was directed toward the pylorus. The vein of starr was used to confirm the position. Approximately six centimeters proximal to the pylorus, the vessel sealer was used to divide the gastrocolic omentum. The lesser sac was opened and the division of this omentum was carried up all the way to the angle of His.  The short gastrics were identified and carefully cauterized, using the vessel sealer. After completing the division of the greater curvature attachments, a 38 Upper sorbian bougie was passed toward the pylorus. Sequential 60mm firings of a da Elian SureForm stapler were performed (1 green and 6 blue), taking care not to overly narrow the pouch, specifically at the incisura. At the angle of His, an outpuching of tissue was left to ensure that the staple line did not encroach on the esophagus. The outpouching of the gastric sleeve was examined and found to be hemostatic. An intraoperative leak test was performed which demonstrated no leak. The robot was undocked and moved away from the operating room table. The 12 mm port site was then widened with a My clamp and the gastric sleeve specimen was removed intact and passed off for permanent pathology. The extraction site was closed with several trans-fascial 0-Vicryl sutures. All skin incisions were closed using a 4-0 Vicryl in a subcuticular fashion. Dermabond was used to cover the incisions. The patient was extubated and moved to the recovery room in stable condition. At the end of the case, the needle, instrument, and sponge counts were correct x 2. Dr. Casey Zepeda was present, scrubbed, and supervised the entire case. Dr. Casey Zepeda personally informed the family of the outcome of the procedure.     Rae Hayes MD

## 2022-09-12 NOTE — H&P
History and Physical Update    Original H&P done in office on 8/23/2022 (less than 30 days ago). Pt reports the following changes in health since being seen last:  None    Vitals:    09/12/22 0803   BP: (!) 144/74   Pulse: 61   Resp: 18   Temp: 97.2 °F (36.2 °C)   SpO2: 96%       Alert and oriented x 3, no apparent distress at rest  Atraumatic, normocephalic. EOMI. Breathing unlabored. RRR. Soft, non-tender, non-distended. Moves all extremities. Warm, dry. King Khloe is a 31 yo female here today for elective bariatric surgery    -Consent obtained in office. -Abx ordered in office.  -Reviewed expected pre-operative, operative, and post-operative courses. -Answered questions to patient's satisfaction.   -Reviewed risks, benefits, alternative to procedure.   -Proceed as scheduled. -The patient was counseled at length about the risks of kenneth Covid-19 during their perioperative period and any recovery window from their procedure. The patient was made aware that kenneth Covid-19  may worsen their prognosis for recovering from their procedure  and lend to a higher morbidity and/or mortality risk. All material risks, benefits, and reasonable alternatives including postponing the procedure were discussed. The patient does wish to proceed with the procedure at this time. Ky Done, APRN - CNP     Agree with above. Pt seen in pre op area.  at bedside. All questions answered. Proceed as scheduled.      Adonis Johnson MD

## 2022-09-12 NOTE — PROGRESS NOTES
1056 Pt arrived from OR to PACU. Monitors applied and alarms set. Report received from [facility] Leona Saavedra CRNA  6463 Medicated for pain  1135 Medicated for nausea. 1145 Pt repositioned. HOB higher  1445 Report called to [facility].

## 2022-09-13 VITALS
RESPIRATION RATE: 18 BRPM | HEART RATE: 53 BPM | HEIGHT: 65 IN | SYSTOLIC BLOOD PRESSURE: 117 MMHG | OXYGEN SATURATION: 95 % | TEMPERATURE: 97.7 F | BODY MASS INDEX: 48.82 KG/M2 | DIASTOLIC BLOOD PRESSURE: 78 MMHG | WEIGHT: 293 LBS

## 2022-09-13 LAB
ALBUMIN SERPL-MCNC: 4 GM/DL (ref 3.4–5)
ALP BLD-CCNC: 73 IU/L (ref 40–128)
ALT SERPL-CCNC: 39 U/L (ref 10–40)
ANION GAP SERPL CALCULATED.3IONS-SCNC: 10 MMOL/L (ref 4–16)
AST SERPL-CCNC: 25 IU/L (ref 15–37)
BASOPHILS ABSOLUTE: 0 K/CU MM
BASOPHILS RELATIVE PERCENT: 0.2 % (ref 0–1)
BILIRUB SERPL-MCNC: 0.3 MG/DL (ref 0–1)
BUN BLDV-MCNC: 12 MG/DL (ref 6–23)
CALCIUM SERPL-MCNC: 8.8 MG/DL (ref 8.3–10.6)
CHLORIDE BLD-SCNC: 104 MMOL/L (ref 99–110)
CO2: 25 MMOL/L (ref 21–32)
CREAT SERPL-MCNC: 0.7 MG/DL (ref 0.6–1.1)
DIFFERENTIAL TYPE: ABNORMAL
EOSINOPHILS ABSOLUTE: 0 K/CU MM
EOSINOPHILS RELATIVE PERCENT: 0.1 % (ref 0–3)
GFR AFRICAN AMERICAN: >60 ML/MIN/1.73M2
GFR NON-AFRICAN AMERICAN: >60 ML/MIN/1.73M2
GLUCOSE BLD-MCNC: 112 MG/DL (ref 70–99)
HCT VFR BLD CALC: 40.2 % (ref 37–47)
HEMOGLOBIN: 13.3 GM/DL (ref 12.5–16)
IMMATURE NEUTROPHIL %: 0.5 % (ref 0–0.43)
LYMPHOCYTES ABSOLUTE: 2.2 K/CU MM
LYMPHOCYTES RELATIVE PERCENT: 19.7 % (ref 24–44)
MCH RBC QN AUTO: 29.9 PG (ref 27–31)
MCHC RBC AUTO-ENTMCNC: 33.1 % (ref 32–36)
MCV RBC AUTO: 90.3 FL (ref 78–100)
MONOCYTES ABSOLUTE: 0.7 K/CU MM
MONOCYTES RELATIVE PERCENT: 6.2 % (ref 0–4)
NUCLEATED RBC %: 0 %
PDW BLD-RTO: 13 % (ref 11.7–14.9)
PLATELET # BLD: 299 K/CU MM (ref 140–440)
PMV BLD AUTO: 9.7 FL (ref 7.5–11.1)
POTASSIUM SERPL-SCNC: 4.1 MMOL/L (ref 3.5–5.1)
RBC # BLD: 4.45 M/CU MM (ref 4.2–5.4)
SEGMENTED NEUTROPHILS ABSOLUTE COUNT: 8.2 K/CU MM
SEGMENTED NEUTROPHILS RELATIVE PERCENT: 73.3 % (ref 36–66)
SODIUM BLD-SCNC: 139 MMOL/L (ref 135–145)
TOTAL IMMATURE NEUTOROPHIL: 0.06 K/CU MM
TOTAL NUCLEATED RBC: 0 K/CU MM
TOTAL PROTEIN: 6 GM/DL (ref 6.4–8.2)
WBC # BLD: 11.2 K/CU MM (ref 4–10.5)

## 2022-09-13 PROCEDURE — 94761 N-INVAS EAR/PLS OXIMETRY MLT: CPT

## 2022-09-13 PROCEDURE — 36415 COLL VENOUS BLD VENIPUNCTURE: CPT

## 2022-09-13 PROCEDURE — 6370000000 HC RX 637 (ALT 250 FOR IP): Performed by: SURGERY

## 2022-09-13 PROCEDURE — 99024 POSTOP FOLLOW-UP VISIT: CPT | Performed by: SURGERY

## 2022-09-13 PROCEDURE — 94664 DEMO&/EVAL PT USE INHALER: CPT

## 2022-09-13 PROCEDURE — 99024 POSTOP FOLLOW-UP VISIT: CPT | Performed by: NURSE PRACTITIONER

## 2022-09-13 PROCEDURE — 2580000003 HC RX 258: Performed by: SURGERY

## 2022-09-13 PROCEDURE — 6370000000 HC RX 637 (ALT 250 FOR IP): Performed by: NURSE PRACTITIONER

## 2022-09-13 PROCEDURE — 85025 COMPLETE CBC W/AUTO DIFF WBC: CPT

## 2022-09-13 PROCEDURE — 6360000002 HC RX W HCPCS: Performed by: SURGERY

## 2022-09-13 PROCEDURE — APPNB30 APP NON BILLABLE TIME 0-30 MINS: Performed by: NURSE PRACTITIONER

## 2022-09-13 PROCEDURE — 80053 COMPREHEN METABOLIC PANEL: CPT

## 2022-09-13 RX ORDER — SENNA PLUS 8.6 MG/1
1 TABLET ORAL 2 TIMES DAILY
Qty: 60 TABLET | Refills: 11 | Status: SHIPPED | OUTPATIENT
Start: 2022-09-13 | End: 2022-10-19 | Stop reason: SDUPTHER

## 2022-09-13 RX ORDER — PANTOPRAZOLE SODIUM 20 MG/1
20 TABLET, DELAYED RELEASE ORAL 2 TIMES DAILY
Qty: 60 TABLET | Refills: 3 | Status: SHIPPED | OUTPATIENT
Start: 2022-09-13 | End: 2022-10-19 | Stop reason: SDUPTHER

## 2022-09-13 RX ORDER — ONDANSETRON 4 MG/1
4 TABLET, ORALLY DISINTEGRATING ORAL 3 TIMES DAILY PRN
Qty: 21 TABLET | Refills: 2 | Status: SHIPPED | OUTPATIENT
Start: 2022-09-13

## 2022-09-13 RX ORDER — HYDROCODONE BITARTRATE AND ACETAMINOPHEN 5; 325 MG/1; MG/1
1 TABLET ORAL EVERY 6 HOURS PRN
Qty: 20 TABLET | Refills: 0 | Status: SHIPPED | OUTPATIENT
Start: 2022-09-13 | End: 2022-09-18

## 2022-09-13 RX ADMIN — OXYCODONE HYDROCHLORIDE AND ACETAMINOPHEN 500 MG: 500 TABLET ORAL at 08:22

## 2022-09-13 RX ADMIN — MULTIPLE VITAMINS W/ MINERALS TAB 1 TABLET: TAB at 08:21

## 2022-09-13 RX ADMIN — Medication 2000 UNITS: at 08:22

## 2022-09-13 RX ADMIN — KETOROLAC TROMETHAMINE 30 MG: 30 INJECTION, SOLUTION INTRAMUSCULAR; INTRAVENOUS at 05:34

## 2022-09-13 RX ADMIN — SODIUM CHLORIDE, PRESERVATIVE FREE 10 ML: 5 INJECTION INTRAVENOUS at 08:21

## 2022-09-13 RX ADMIN — HYDROCODONE BITARTRATE AND ACETAMINOPHEN 1 TABLET: 5; 325 TABLET ORAL at 13:56

## 2022-09-13 RX ADMIN — CEFAZOLIN 2000 MG: 2 INJECTION, POWDER, FOR SOLUTION INTRAMUSCULAR; INTRAVENOUS at 00:58

## 2022-09-13 RX ADMIN — CEFAZOLIN 2000 MG: 2 INJECTION, POWDER, FOR SOLUTION INTRAMUSCULAR; INTRAVENOUS at 10:50

## 2022-09-13 RX ADMIN — SODIUM CHLORIDE, POTASSIUM CHLORIDE, SODIUM LACTATE AND CALCIUM CHLORIDE: 600; 310; 30; 20 INJECTION, SOLUTION INTRAVENOUS at 11:38

## 2022-09-13 RX ADMIN — SODIUM CHLORIDE, POTASSIUM CHLORIDE, SODIUM LACTATE AND CALCIUM CHLORIDE: 600; 310; 30; 20 INJECTION, SOLUTION INTRAVENOUS at 05:33

## 2022-09-13 RX ADMIN — ENOXAPARIN SODIUM 60 MG: 100 INJECTION SUBCUTANEOUS at 08:22

## 2022-09-13 RX ADMIN — HYDROCODONE BITARTRATE AND ACETAMINOPHEN 1 TABLET: 5; 325 TABLET ORAL at 06:44

## 2022-09-13 RX ADMIN — FAMOTIDINE 20 MG: 20 TABLET ORAL at 08:21

## 2022-09-13 ASSESSMENT — PAIN DESCRIPTION - LOCATION
LOCATION: ABDOMEN

## 2022-09-13 ASSESSMENT — PAIN DESCRIPTION - DESCRIPTORS
DESCRIPTORS: DISCOMFORT
DESCRIPTORS: DISCOMFORT
DESCRIPTORS: THROBBING
DESCRIPTORS: DISCOMFORT

## 2022-09-13 ASSESSMENT — PAIN DESCRIPTION - ONSET
ONSET: ON-GOING

## 2022-09-13 ASSESSMENT — PAIN - FUNCTIONAL ASSESSMENT
PAIN_FUNCTIONAL_ASSESSMENT: ACTIVITIES ARE NOT PREVENTED
PAIN_FUNCTIONAL_ASSESSMENT: ACTIVITIES ARE NOT PREVENTED
PAIN_FUNCTIONAL_ASSESSMENT: PREVENTS OR INTERFERES SOME ACTIVE ACTIVITIES AND ADLS
PAIN_FUNCTIONAL_ASSESSMENT: ACTIVITIES ARE NOT PREVENTED

## 2022-09-13 ASSESSMENT — PAIN SCALES - GENERAL
PAINLEVEL_OUTOF10: 4
PAINLEVEL_OUTOF10: 0
PAINLEVEL_OUTOF10: 6
PAINLEVEL_OUTOF10: 2
PAINLEVEL_OUTOF10: 5

## 2022-09-13 ASSESSMENT — PAIN DESCRIPTION - ORIENTATION
ORIENTATION: RIGHT;LEFT;LOWER;UPPER
ORIENTATION: RIGHT;LEFT;UPPER;LOWER
ORIENTATION: RIGHT
ORIENTATION: RIGHT;LEFT;UPPER;LOWER

## 2022-09-13 ASSESSMENT — PAIN DESCRIPTION - FREQUENCY
FREQUENCY: CONTINUOUS

## 2022-09-13 ASSESSMENT — PAIN DESCRIPTION - PAIN TYPE
TYPE: SURGICAL PAIN

## 2022-09-13 NOTE — PROGRESS NOTES
4 Eyes Skin Assessment     NAME:  Silvana Izaguirre  YOB: 1985  MEDICAL RECORD NUMBER:  [id number]    The patient is being assess for  Admission    I agree that 2 RN's have performed a thorough Head to Toe Skin Assessment on the patient. ALL assessment sites listed below have been assessed. Areas assessed by both nurses:    Head, Face, Ears, Shoulders, Back, Chest, Arms, Elbows, Hands, Sacrum. Buttock, Coccyx, Ischium, and Legs. Feet and Heels        Does the Patient have a Wound?  No noted wound(s)       Edison Prevention initiated:  NA   Wound Care Orders initiated:  NA    Pressure Injury (Stage 3,4, Unstageable, DTI, NWPT, and Complex wounds) if present place referral/consult order under [de-identified] NA    New and Established Ostomies if present place consult order under : NA      Nurse 1 eSignature: Electronically signed by Brittany Hoyt RN on 9/13/22 at 6:54 AM EDT    **SHARE this note so that the co-signing nurse is able to place an eSignature**    Nurse 2 eSignature: Electronically signed by Katherine Sinclair RN on 9/13/22 at 6:55 AM EDT

## 2022-09-13 NOTE — PROGRESS NOTES
Outpatient Pharmacy Progress Note for Meds-to-Beds    Total number of Prescriptions Filled: 4  The following medications were dispensed to the patient during the discharge process:  Senna  Hydrocodone-Ondansetron  Pantoprazole  Carvedilol  Augmentin    Additional Documentation:  Patient's family member picked-up the medication(s) in the OP Pharmacy ()      Thank you for letting us serve your patients.   [facility]    [address], [address] [facility]    Phone: [phone]    Fax: [phone]

## 2022-09-13 NOTE — DISCHARGE INSTRUCTIONS
HOME CARE INSTRUCTIONS FOLLOWING SURGERY     Diet:  Slowly advance diet to stage II FULL LIQUIDS BARIATRIC diet as tolerated x 2 wks. Increase your fluids, as pain medications may cause constipation. No alcoholic beverages. Activities:  No strenuous activity  No lifting greater than 20 pounds  No driving while taking pain medication    Wound Care:  No tub baths, swimming, or hot tubs for 6 wks. May shower 24 hours after surgery. Ice to incision the first 24 hrs as needed for pain; after that may use dry warm compresses as needed. Allow Dermaond (the skin glue) to fall off on its own. Pain control:  Norco 5/325 m tablet by mouth every 4-6 hours as needed for pain. It is best to avoid narcotics if able. Recommend taking tylenol around the clock and only take Norco for breakthrough pain. When taking narcotic pain medication it increases the risk of constipation and addictive habits. Antacid:  Protonix as prescribed twice daily for 3 months. Stool softener:  Senna 8.6 mg with Docusate Sodium 50 mg (Senokot-S): Two Capsules by mouth every day while on Percocet (or any narcotics) to prevent constipation. (May take 2-twice a day if needed). Laxative:  Milk of magnesia 30-60 ml by mouth every day as needed for constipation. Please call the office at: [phone]  to make a follow-up with Dr Marko Huffman in 1 week. Call Surgeon if you have:  Temperature greater than 101. Persistent nausea and vomiting. Severe uncontrolled pain. Redness, tenderness, or signs of infection (pain, swelling, redness, odor or green/yellow discharge around the site). Difficulty breathing, headache or visual disturbances. Hives. Persistent dizziness or light-headedness. Extreme fatigue. Any other questions or concerns you may have after discharge. In an emergency, call 911 or go to an Emergency Department.       It is important to bring a complete, current list of your medications to any medical appointments or hospitalizations.   ____________________________________________    Signed:    TRICIA Dalton - CNP, TRICIA-CNP    9/13/2022  1:07 PM

## 2022-09-13 NOTE — PROGRESS NOTES
General Surgery-Dr. Elayne Villatoro Day: 2    Chief Complaint on Admission: morbid obesity      Subjective:     Claudette Shoemaker is a 30 y.o. female POD #1 s/p robot assisted sleeve gastrectomy. Doing well this morning. Nausea and pain is well controlled. + OOB. + IS use    ROS:  Review of Systems  Negative unless above    Allergies  Patient has no known allergies. Diagnosis Date    H/O Doppler echocardiogram 2022    EF 55-60%. MIld concentric LV hypertrophy. RVSP 35 mild PHTN. History of exercise stress test 2022    ECG portion of stress test is negative for ischemia by diagnostic criteria. HTN respnse to exercise and frequent PVCs noted post exercise. Norman score 3. Hypertension     Sleep apnea        Objective:     Vitals:    22 0644   BP:    Pulse:    Resp: 16   Temp:    SpO2:        TEMPERATURE:  Current -Temp: 98.2 °F (36.8 °C); Max - Temp  Av.9 °F (36.6 °C)  Min: 97.2 °F (36.2 °C)  Max: 98.7 °F (37.1 °C)    No intake/output data recorded. I/O last 3 completed shifts: In: 900 [I.V.:800; IV Piggyback:100]  Out: 36 [Urine:700; Blood:30]      Physical Exam:  Physical Exam  Constitutional:       Appearance: Normal appearance. She is obese. HENT:      Head: Normocephalic and atraumatic. Right Ear: External ear normal.      Left Ear: External ear normal.      Nose: Nose normal.      Mouth/Throat:      Mouth: Mucous membranes are moist.   Eyes:      Extraocular Movements: Extraocular movements intact. Pupils: Pupils are equal, round, and reactive to light. Cardiovascular:      Rate and Rhythm: Normal rate and regular rhythm. Pulses: Normal pulses. Heart sounds: Normal heart sounds. Pulmonary:      Effort: Pulmonary effort is normal.      Breath sounds: Normal breath sounds. Abdominal:      General: Abdomen is flat. Palpations: Abdomen is soft. Tenderness: There is abdominal tenderness.       Comments: Incisional tenderness   Musculoskeletal: General: Normal range of motion. Cervical back: Normal range of motion and neck supple. Skin:     General: Skin is warm and dry. Comments: Lap incisions well approximated. Skin glue intact   Neurological:      General: No focal deficit present. Mental Status: She is alert and oriented to person, place, and time. Mental status is at baseline.    Psychiatric:         Mood and Affect: Mood normal.         Behavior: Behavior normal.         Scheduled Meds:   ketorolac  30 mg IntraVENous Q6H    sodium chloride flush  5-40 mL IntraVENous 2 times per day    ceFAZolin  2,000 mg IntraVENous Q8H    enoxaparin  60 mg SubCUTAneous 2 times per day    ascorbic acid  500 mg Oral Daily    famotidine  20 mg Oral BID    therapeutic multivitamin-minerals  1 tablet Oral Daily    valsartan-hydroCHLOROthiazide  1 tablet Oral Daily    Vitamin D  2,000 Units Oral Daily     ContinuousInfusions:   sodium chloride      lactated ringers 125 mL/hr at 09/13/22 0533     PRN Meds:HYDROcodone-acetaminophen, sodium chloride flush, sodium chloride, potassium chloride **OR** potassium alternative oral replacement **OR** potassium chloride, magnesium sulfate, morphine **OR** morphine, ondansetron, metoclopramide      Labs/Imaging Results:   Lab Results   Component Value Date    WBC 11.2 (H) 09/13/2022    HGB 13.3 09/13/2022    HCT 40.2 09/13/2022    MCV 90.3 09/13/2022     09/13/2022     Lab Results   Component Value Date     09/13/2022    K 4.1 09/13/2022     09/13/2022    CO2 25 09/13/2022    BUN 12 09/13/2022    CREATININE 0.7 09/13/2022    GLUCOSE 112 (H) 09/13/2022    CALCIUM 8.8 09/13/2022    PROT 6.0 (L) 09/13/2022    LABALBU 4.0 09/13/2022    BILITOT 0.3 09/13/2022    ALKPHOS 73 09/13/2022    AST 25 09/13/2022    ALT 39 09/13/2022    LABGLOM >60 09/13/2022    GFRAA >60 09/13/2022       Assessment:     Geovanny Lockett is a 31 yo female s/p robot assisted sleeve gastrectomy    Plan:       - Doing well  - Labs unremarkable  - Will advance diet to bariatric full liquids  - Increase ambulation  - Continue to work towards fluid goal  - Okay for GI/DVT prophylaxis  - Will plan for discharge later this afternoon once she nears 32oz fluid goal. Discussed discharge, medications, restrictions, postop care, and diet stages. Patients verbalizes understanding. Electronically signed by TRICIA Esparza CNP on 9/13/2022 at 7:28 AM     Pt seen this afternoon. Without complaints. Reviewed labs and vitals with pt. Tolerating PO. No N/V. Wants to go home. No F/C. No CP/SOB. +ambulating and using IS. Vitals:    09/13/22 0731   BP: 117/78   Pulse: 53   Resp: 18   Temp: 97.7 °F (36.5 °C)   SpO2: 95%     S, obese, min and appropriately TTP, ND, no PS, incisions C/D/I  RRR  Breathing unlabored  WOLF, warm, dry    Planned d/c this afternoon if continues to do well. D/w pt home going instructions including diet, activity, etc.   Pt instructed to call office with any questions or concerns. Acknowledged.     Sara Akins MD

## 2022-09-13 NOTE — PROGRESS NOTES
Negative. HENT: Negative. Eyes: Negative. Respiratory: Negative. Cardiovascular: Negative. Gastrointestinal: Negative. Endocrine: Negative. Genitourinary: Negative. Musculoskeletal: Negative. Skin: Negative. Allergic/Immunologic: Negative. Neurological: Negative. Hematological: Negative. Psychiatric/Behavioral: Negative. Objective: Intake/Output Summary (Last 24 hours) at 9/13/2022 0926  Last data filed at 9/13/2022 0327  Gross per 24 hour   Intake 800 ml   Output 730 ml   Net 70 ml        Vitals:   Vitals:    09/13/22 0731   BP: 117/78   Pulse: 53   Resp: 18   Temp: 97.7 °F (36.5 °C)   SpO2: 95%       Physical Exam:     General: NAD  Eyes: EOMI  ENT: neck supple  Cardiovascular: Regular rate. Respiratory: Clear to auscultation  Gastrointestinal: Soft, non tender  Genitourinary: no suprapubic tenderness  Musculoskeletal: No edema  Skin: warm, dry, surgical insertion clean and dry, healing well  Neuro: Alert. Psych: Mood appropriate.      Medications:   Medications:    ketorolac  30 mg IntraVENous Q6H    sodium chloride flush  5-40 mL IntraVENous 2 times per day    ceFAZolin  2,000 mg IntraVENous Q8H    enoxaparin  60 mg SubCUTAneous 2 times per day    ascorbic acid  500 mg Oral Daily    famotidine  20 mg Oral BID    therapeutic multivitamin-minerals  1 tablet Oral Daily    valsartan-hydroCHLOROthiazide  1 tablet Oral Daily    Vitamin D  2,000 Units Oral Daily      Infusions:    sodium chloride      lactated ringers 125 mL/hr at 09/13/22 0533     PRN Meds: HYDROcodone-acetaminophen, 1 tablet, Q6H PRN  sodium chloride flush, 10 mL, PRN  sodium chloride, , PRN  potassium chloride, 40 mEq, PRN   Or  potassium alternative oral replacement, 40 mEq, PRN   Or  potassium chloride, 10 mEq, PRN  magnesium sulfate, 2,000 mg, PRN  morphine, 2 mg, Q2H PRN   Or  morphine, 4 mg, Q2H PRN  ondansetron, 4 mg, Q6H PRN  metoclopramide, 10 mg, Q6H PRN        Labs      Recent Results (from the past 24 hour(s))   POCT Glucose    Collection Time: 09/12/22  7:54 PM   Result Value Ref Range    POC Glucose 114 (H) 70 - 99 MG/DL   Comprehensive Metabolic Panel w/ Reflex to MG    Collection Time: 09/13/22  3:04 AM   Result Value Ref Range    Sodium 139 135 - 145 MMOL/L    Potassium 4.1 3.5 - 5.1 MMOL/L    Chloride 104 99 - 110 mMol/L    CO2 25 21 - 32 MMOL/L    BUN 12 6 - 23 MG/DL    Creatinine 0.7 0.6 - 1.1 MG/DL    Glucose 112 (H) 70 - 99 MG/DL    Calcium 8.8 8.3 - 10.6 MG/DL    Albumin 4.0 3.4 - 5.0 GM/DL    Total Protein 6.0 (L) 6.4 - 8.2 GM/DL    Total Bilirubin 0.3 0.0 - 1.0 MG/DL    ALT 39 10 - 40 U/L    AST 25 15 - 37 IU/L    Alkaline Phosphatase 73 40 - 128 IU/L    GFR Non-African American >60 >60 mL/min/1.73m2    GFR African American >60 >60 mL/min/1.73m2    Anion Gap 10 4 - 16   CBC with Auto Differential    Collection Time: 09/13/22  3:04 AM   Result Value Ref Range    WBC 11.2 (H) 4.0 - 10.5 K/CU MM    RBC 4.45 4.2 - 5.4 M/CU MM    Hemoglobin 13.3 12.5 - 16.0 GM/DL    Hematocrit 40.2 37 - 47 %    MCV 90.3 78 - 100 FL    MCH 29.9 27 - 31 PG    MCHC 33.1 32.0 - 36.0 %    RDW 13.0 11.7 - 14.9 %    Platelets 145 733 - 970 K/CU MM    MPV 9.7 7.5 - 11.1 FL    Differential Type AUTOMATED DIFFERENTIAL     Segs Relative 73.3 (H) 36 - 66 %    Lymphocytes % 19.7 (L) 24 - 44 %    Monocytes % 6.2 (H) 0 - 4 %    Eosinophils % 0.1 0 - 3 %    Basophils % 0.2 0 - 1 %    Segs Absolute 8.2 K/CU MM    Lymphocytes Absolute 2.2 K/CU MM    Monocytes Absolute 0.7 K/CU MM    Eosinophils Absolute 0.0 K/CU MM    Basophils Absolute 0.0 K/CU MM    Nucleated RBC % 0.0 %    Total Nucleated RBC 0.0 K/CU MM    Total Immature Neutrophil 0.06 K/CU MM    Immature Neutrophil % 0.5 (H) 0 - 0.43 %        Imaging/Diagnostics Last 24 Hours   No results found.     Electronically signed by Niyah Jackman CNP on 9/13/2022 at 9:26 AM

## 2022-09-13 NOTE — PLAN OF CARE
Problem: Discharge Planning  Goal: Discharge to home or other facility with appropriate resources  Recent Flowsheet Documentation  Taken 9/13/2022 0250 by Milli Alanis RN  Discharge to home or other facility with appropriate resources:   Identify barriers to discharge with patient and caregiver   Identify discharge learning needs (meds, wound care, etc)   Refer to discharge planning if patient needs post-hospital services based on physician order or complex needs related to functional status, cognitive ability or social support system   Arrange for needed discharge resources and transportation as appropriate   Arrange for interpreters to assist at discharge as needed  9/13/2022 0202 by Mindy Cardoso RN  Outcome: Progressing     Problem: Pain  Goal: Verbalizes/displays adequate comfort level or baseline comfort level  9/13/2022 1115 by Annelise Ambriz RN  Outcome: Progressing  9/13/2022 0202 by Mindy Cardoso RN  Outcome: Progressing     Problem: ABCDS Injury Assessment  Goal: Absence of physical injury  9/13/2022 1115 by Annelise Ambriz RN  Outcome: Progressing  9/13/2022 0202 by Mindy Cardoso RN  Outcome: Progressing

## 2022-09-13 NOTE — DISCHARGE SUMMARY
06/17/22 97.1 °F (36.2 °C) (Tympanic)   11/26/21 97.9 °F (36.6 °C) (Oral)   ,  BP Readings from Last 3 Encounters:   09/13/22 117/78   09/09/22 126/80   08/23/22 128/80   ,  Pulse Readings from Last 3 Encounters:   09/13/22 53   09/09/22 87   08/23/22 62        Disposition: home. Patient Instructions:   Current Discharge Medication List        START taking these medications    Details   pantoprazole (PROTONIX) 20 MG tablet Take 1 tablet by mouth 2 times daily  Qty: 60 tablet, Refills: 3      ondansetron (ZOFRAN-ODT) 4 MG disintegrating tablet Take 1 tablet by mouth 3 times daily as needed for Nausea or Vomiting  Qty: 21 tablet, Refills: 2      senna (SENOKOT) 8.6 MG tablet Take 1 tablet by mouth 2 times daily  Qty: 60 tablet, Refills: 11      HYDROcodone-acetaminophen (NORCO) 5-325 MG per tablet Take 1 tablet by mouth every 6 hours as needed for Pain for up to 5 days. Intended supply: 5 days. Take lowest dose possible to manage pain  Qty: 20 tablet, Refills: 0    Comments: Reduce doses taken as pain becomes manageable  Associated Diagnoses: Morbid obesity ([address]           CONTINUE these medications which have NOT CHANGED    Details   valsartan-hydroCHLOROthiazide (DIOVAN-HCT) 80-12.5 MG per tablet TAKE 1 TABLET BY MOUTH EVERY DAY  Qty: 90 tablet, Refills: 3      CPAP Machine MISC 14 cm by CPAP route nightly      Collagen-Vitamin C-Biotin (COLLAGEN 1500/C PO) Take by mouth           STOP taking these medications       ZINC PO Comments:   Reason for Stopping:         famotidine (PEPCID) 20 MG tablet Comments:   Reason for Stopping:         ascorbic acid (VITAMIN C) 500 MG tablet Comments:   Reason for Stopping:         vitamin D 25 MCG (1000 UT) CAPS Comments:   Reason for Stopping:         Multiple Vitamins-Minerals (MULTIVITAMIN ADULT PO) Comments:   Reason for Stopping:             Activity: no lifting > 20 Lbs, or Strenuous exercise for 3 weeks.   Diet: encourage fluids and bariatric stage II diet for 2 wks.  Wound Care: keep wound clean and dry    Call  [phone] to make a follow-up appointment  with Dr Jairo Morgan in 1 week.    ____________________________________________    Signed:    TRICIA Pascual - COCO, TRICIA-CNP    9/13/2022  1:07 PM

## 2022-09-14 ENCOUNTER — SCHEDULED TELEPHONE ENCOUNTER (OUTPATIENT)
Dept: BARIATRICS/WEIGHT MGMT | Age: 37
End: 2022-09-14

## 2022-09-14 DIAGNOSIS — E66.01 MORBID OBESITY DUE TO EXCESS CALORIES (HCC): Primary | ICD-10-CM

## 2022-09-14 PROCEDURE — 99024 POSTOP FOLLOW-UP VISIT: CPT | Performed by: NURSE PRACTITIONER

## 2022-09-14 NOTE — PROGRESS NOTES
Called Narendra Gavin to see how they were doing post-operatively. Protein intake is around 30 grams per day. Reports good hydration 26 oz and urine is clear. Incision sites are healing well and denies any erythema or drainage. Educated on post operative care. Pain medication regimen4-6hr and denies any nausea or vomiting. Bowel movements are normal and last BM was 9/11/22. Encouraged exercise and getting up and moving around at least every hour to prevent pneumonia/DVT's. Confirmed post op appointment with Dr. Bernardo Becerril and aware to call with any questions or concerns.

## 2022-09-20 ENCOUNTER — OFFICE VISIT (OUTPATIENT)
Dept: BARIATRICS/WEIGHT MGMT | Age: 37
End: 2022-09-20

## 2022-09-20 VITALS
BODY MASS INDEX: 48.82 KG/M2 | WEIGHT: 293 LBS | OXYGEN SATURATION: 98 % | HEIGHT: 65 IN | HEART RATE: 64 BPM | DIASTOLIC BLOOD PRESSURE: 72 MMHG | SYSTOLIC BLOOD PRESSURE: 100 MMHG

## 2022-09-20 DIAGNOSIS — Z98.84 S/P LAPAROSCOPIC SLEEVE GASTRECTOMY: Primary | ICD-10-CM

## 2022-09-20 PROCEDURE — 99024 POSTOP FOLLOW-UP VISIT: CPT | Performed by: SURGERY

## 2022-09-20 NOTE — PROGRESS NOTES
BARIATRIC SURGERY OFFICE PROGRESS NOTE    SUBJECTIVE:    Patient presenting today referred from Jacqulin Double, for   Chief Complaint   Patient presents with    Post-Op Check     1st P/O Rigoberto Gastric Sleeve @ [facility] 9/12/22   . Vitals:    09/20/22 0906   BP: 100/72   Pulse: 64   SpO2: 98%        BMI: Body mass index is 58.16 kg/m². Weight History: Wt Readings from Last 3 Encounters:   09/20/22 (!) 349 lb 8 oz (158.5 kg)   09/12/22 (!) 362 lb 6.4 oz (164.4 kg)   09/09/22 (!) 359 lb 8 oz (163.1 kg)        If within 30 days of bariatric surgery date, have you been to the ED: No    HPI:George Cramer is a 30 y.o. female presenting in first bariatric POST-OP visit. Weight change:     -10 lbs since last visit.    -10 lbs since surgery.    -55.2 lbs since starting program.    Changes in health since last visit: None, doing well. Did have BM. Pre-op clearances completed: N/A    Pt tracking calories/protein: Yes, appropriate    Pt exercising: Walking    Pt taking vitamins: Yes    Fluid intake: Appropriate    Past Medical History:   Diagnosis Date    H/O Doppler echocardiogram 04/21/2022    EF 55-60%. MIld concentric LV hypertrophy. RVSP 35 mild PHTN. History of exercise stress test 04/21/2022    ECG portion of stress test is negative for ischemia by diagnostic criteria. HTN respnse to exercise and frequent PVCs noted post exercise. Norman score 3.     Hypertension     Sleep apnea         Patient Active Problem List   Diagnosis    Essential hypertension    Patient overweight    Polycystic ovary syndrome    Pneumonia due to COVID-19 virus    TENNILLE (obstructive sleep apnea)    Class 3 severe obesity due to excess calories without serious comorbidity with body mass index (BMI) of 60.0 to 69.9 in adult [facility])    Mild pulmonary hypertension ([address]    Morbid obesity ([address]       Past Surgical History:   Procedure Laterality Date    BARIATRIC SURGERY  09/12/2022    ENDOSCOPY, COLON, DIAGNOSTIC      SLEEVE GASTRECTOMY N/A 9/12/2022    GASTRECTOMY SLEEVE LAPAROSCOPIC ROBOTIC performed by Kitty Rai MD at [address] 06/17/2022    EGD BIOPSY performed by Kitty Rai MD at [facility] ENDOSCOPY       Current Outpatient Medications   Medication Sig Dispense Refill    pantoprazole (PROTONIX) 20 MG tablet Take 1 tablet by mouth 2 times daily 60 tablet 3    senna (SENOKOT) 8.6 MG tablet Take 1 tablet by mouth 2 times daily 60 tablet 11    valsartan-hydroCHLOROthiazide (DIOVAN-HCT) 80-12.5 MG per tablet TAKE 1 TABLET BY MOUTH EVERY DAY 90 tablet 3    CPAP Machine MISC 14 cm by CPAP route nightly      ondansetron (ZOFRAN-ODT) 4 MG disintegrating tablet Take 1 tablet by mouth 3 times daily as needed for Nausea or Vomiting (Patient not taking: Reported on 9/20/2022) 21 tablet 2    Collagen-Vitamin C-Biotin (COLLAGEN 1500/C PO) Take by mouth (Patient not taking: Reported on 9/20/2022)       No current facility-administered medications for this visit. No Known Allergies      Review of Systems   All other systems reviewed and are negative. OBJECTIVE:    /72 (Site: Right Upper Arm, Position: Sitting, Cuff Size: Large Adult)   Pulse 64   Ht 5' 5\" (1.651 m)   Wt (!) 349 lb 8 oz (158.5 kg)   SpO2 98%   BMI 58.16 kg/m²      Physical Exam  Vitals reviewed. Constitutional:       General: She is not in acute distress. Appearance: She is obese. She is not ill-appearing, toxic-appearing or diaphoretic. HENT:      Head: Normocephalic and atraumatic. Right Ear: External ear normal.      Left Ear: External ear normal.      Mouth/Throat:      Mouth: Mucous membranes are dry. Pharynx: No oropharyngeal exudate or posterior oropharyngeal erythema. Eyes:      General:         Right eye: No discharge. Left eye: No discharge. Extraocular Movements: Extraocular movements intact. Cardiovascular:      Rate and Rhythm: Normal rate.    Pulmonary:      Effort: No respiratory distress. Abdominal:      Palpations: Abdomen is soft. Tenderness: There is no abdominal tenderness. There is no guarding. Comments: Incisions healing appropriately, mild bruising    Musculoskeletal:         General: No swelling. Skin:     General: Skin is warm. Neurological:      General: No focal deficit present. Mental Status: She is alert. Psychiatric:         Mood and Affect: Mood normal.       Pathology:  Amendments:   A   Final Pathologic Diagnosis:   Stomach; partial sleeve gastrectomy:   -     Mild chronic gastritis and reactive changes. ASSESSMENT & PLAN:    1. S/P laparoscopic sleeve gastrectomy  -Reviewed pathology report  -600 estephania / d  -60 g protein / d  -Increase activity  -Advance diet per protocol. Next stage is pureed starting 9/26.   -F/u 2 weeks with CNP  -Start MVI  -Call with any questions, concerns, or issues whatsoever. -BP was slightly low, may need BP medication titrated. Pt to discuss with PCP. As of current visit, regarding obesity-related co-morbid conditions:  TENNILLE [] compliant [] no longer using [] resolved per sleep study; hypertension [] medications; hyperlipidemia [] medications; GERD [] medications; DM [] insulin [] non-insulin [] no meds         No orders of the defined types were placed in this encounter. No orders of the defined types were placed in this encounter. Follow Up:  No follow-ups on file.     Koffi Francisco MD

## 2022-10-04 ENCOUNTER — OFFICE VISIT (OUTPATIENT)
Dept: BARIATRICS/WEIGHT MGMT | Age: 37
End: 2022-10-04
Payer: COMMERCIAL

## 2022-10-04 VITALS
HEART RATE: 78 BPM | SYSTOLIC BLOOD PRESSURE: 128 MMHG | BODY MASS INDEX: 48.82 KG/M2 | DIASTOLIC BLOOD PRESSURE: 80 MMHG | WEIGHT: 293 LBS | OXYGEN SATURATION: 99 % | HEIGHT: 65 IN

## 2022-10-04 DIAGNOSIS — Z98.84 S/P LAPAROSCOPIC SLEEVE GASTRECTOMY: Primary | ICD-10-CM

## 2022-10-04 PROCEDURE — 99213 OFFICE O/P EST LOW 20 MIN: CPT | Performed by: NURSE PRACTITIONER

## 2022-10-04 RX ORDER — M-VIT,TX,IRON,MINS/CALC/FOLIC 27MG-0.4MG
1 TABLET ORAL DAILY
COMMUNITY

## 2022-10-04 RX ORDER — MULTIVIT WITH MINERALS/LUTEIN
250 TABLET ORAL DAILY
COMMUNITY

## 2022-10-04 ASSESSMENT — ENCOUNTER SYMPTOMS
RESPIRATORY NEGATIVE: 1
EYES NEGATIVE: 1
GASTROINTESTINAL NEGATIVE: 1
ALLERGIC/IMMUNOLOGIC NEGATIVE: 1

## 2022-10-04 ASSESSMENT — PATIENT HEALTH QUESTIONNAIRE - PHQ9
SUM OF ALL RESPONSES TO PHQ QUESTIONS 1-9: 0
1. LITTLE INTEREST OR PLEASURE IN DOING THINGS: 0
SUM OF ALL RESPONSES TO PHQ QUESTIONS 1-9: 0
SUM OF ALL RESPONSES TO PHQ9 QUESTIONS 1 & 2: 0
2. FEELING DOWN, DEPRESSED OR HOPELESS: 0
SUM OF ALL RESPONSES TO PHQ QUESTIONS 1-9: 0
SUM OF ALL RESPONSES TO PHQ QUESTIONS 1-9: 0

## 2022-10-04 NOTE — PROGRESS NOTES
BARIATRIC SURGERY OFFICE PROGRESS NOTE    SUBJECTIVE:    Patient presenting today referred from Velasquez Fuentes, for   Chief Complaint   Patient presents with    Post-Op Check     2nd PO KAREN Maxwell @ [facility] 9/12/22   . Vitals:    10/04/22 1333   BP: 128/80   Pulse: 78   SpO2: 99%        BMI: Body mass index is 56.45 kg/m². Weight History: Wt Readings from Last 3 Encounters:   10/04/22 (!) 339 lb 3.2 oz (153.9 kg)   09/20/22 (!) 349 lb 8 oz (158.5 kg)   09/12/22 (!) 362 lb 6.4 oz (164.4 kg)        If within 30 days of bariatric surgery date, have you been to the ED: Gen Soler is a 30 y.o. female presenting in  bariatric 3 week POST-OP visit. Total weight loss/gain:   -10.3 lbs since last visit  -20.3 lbs since surgery  -75.5 lbs since starting program    Changes in health since last visit: denies    Pt tracking calories: about 600 daily; 60 gms protein    Pt exercising: walking daily half hour    Pt taking vitamins: supplementing    Fluid intake: 64 oz daily    Past Medical History:   Diagnosis Date    H/O Doppler echocardiogram 04/21/2022    EF 55-60%. MIld concentric LV hypertrophy. RVSP 35 mild PHTN. History of exercise stress test 04/21/2022    ECG portion of stress test is negative for ischemia by diagnostic criteria. HTN respnse to exercise and frequent PVCs noted post exercise. Norman score 3.     Hypertension     Sleep apnea         Patient Active Problem List   Diagnosis    Essential hypertension    Patient overweight    Polycystic ovary syndrome    Pneumonia due to COVID-19 virus    TENNILLE (obstructive sleep apnea)    Class 3 severe obesity due to excess calories without serious comorbidity with body mass index (BMI) of 60.0 to 69.9 in adult [facility])    Mild pulmonary hypertension ([address]    Morbid obesity ([address]       Past Surgical History:   Procedure Laterality Date    BARIATRIC SURGERY  09/12/2022    ENDOSCOPY, COLON, DIAGNOSTIC      SLEEVE GASTRECTOMY N/A 9/12/2022    GASTRECTOMY SLEEVE LAPAROSCOPIC ROBOTIC performed by Candance Herbert, MD at [address] [facility] 06/17/2022    EGD BIOPSY performed by Candance Herbert, MD at [facility] ENDOSCOPY       Current Outpatient Medications   Medication Sig Dispense Refill    Ascorbic Acid (VITAMIN C) 250 MG tablet Take 250 mg by mouth daily      Multiple Vitamins-Minerals (THERAPEUTIC MULTIVITAMIN-MINERALS) tablet Take 1 tablet by mouth daily      pantoprazole (PROTONIX) 20 MG tablet Take 1 tablet by mouth 2 times daily 60 tablet 3    senna (SENOKOT) 8.6 MG tablet Take 1 tablet by mouth 2 times daily 60 tablet 11    valsartan-hydroCHLOROthiazide (DIOVAN-HCT) 80-12.5 MG per tablet TAKE 1 TABLET BY MOUTH EVERY DAY 90 tablet 3    CPAP Machine MISC 14 cm by CPAP route nightly      ondansetron (ZOFRAN-ODT) 4 MG disintegrating tablet Take 1 tablet by mouth 3 times daily as needed for Nausea or Vomiting (Patient not taking: Reported on 9/20/2022) 21 tablet 2    Collagen-Vitamin C-Biotin (COLLAGEN 1500/C PO) Take by mouth (Patient not taking: Reported on 9/20/2022)       No current facility-administered medications for this visit. No Known Allergies      Review of Systems   Constitutional: Negative. HENT: Negative. Eyes: Negative. Respiratory: Negative. Cardiovascular: Negative. Gastrointestinal: Negative. Endocrine: Negative. Genitourinary: Negative. Musculoskeletal: Negative. Skin: Negative. Allergic/Immunologic: Negative. Neurological: Negative. Hematological: Negative. Psychiatric/Behavioral: Negative. OBJECTIVE:    /80 (Site: Right Upper Arm, Position: Sitting, Cuff Size: Large Adult)   Pulse 78   Ht 5' 5\" (1.651 m)   Wt (!) 339 lb 3.2 oz (153.9 kg)   SpO2 99%   BMI 56.45 kg/m²      Physical Exam  Constitutional:       Appearance: Normal appearance. She is obese. HENT:      Head: Normocephalic.       Nose: Nose normal.      Mouth/Throat:      Pharynx: Oropharynx is clear.   Eyes:      Conjunctiva/sclera: Conjunctivae normal.      Pupils: Pupils are equal, round, and reactive to light. Cardiovascular:      Rate and Rhythm: Normal rate. Pulses: Normal pulses. Pulmonary:      Effort: Pulmonary effort is normal.   Abdominal:      Comments: Incisions healing appropriately   Musculoskeletal:         General: Normal range of motion. Cervical back: Normal range of motion. Skin:     General: Skin is warm and dry. Capillary Refill: Capillary refill takes less than 2 seconds. Neurological:      General: No focal deficit present. Mental Status: She is alert and oriented to person, place, and time. Psychiatric:         Mood and Affect: Mood normal.         Behavior: Behavior normal.       ASSESSMENT & PLAN:    1. S/P laparoscopic sleeve gastrectomy  - Doing well; Down 20.3 pounds since surgery  - Incisions healing appropriately  - Normal Bm  - Denies nausea or vomiting  - Increasing activity as tolerated  - Tolerating pureed diet; advance to soft foods in one week    - Patient was encouraged to journal all food intake. - Keep calorie level at approximately 600-800, per discussion / plan with registered dietician. - Protein intake is to be a minimum of 50-60 grams per day. - Water drinking was encouraged with a goal of 64oz-128oz daily. Beverages are to be calorie free except for milk. Avoid soda. - Continue supplements  - RTC in one month       As of current visit, regarding obesity-related co-morbid conditions:  TENNILLE [] compliant [] no longer using [] resolved per sleep study; hypertension [] medications; hyperlipidemia [] medications; GERD [] medications; DM [] insulin [] non-insulin [] no meds  NONE      The patient expressed understanding and willingness to comply nicely; all questions and concerns addressed. No orders of the defined types were placed in this encounter. No orders of the defined types were placed in this encounter.       Follow Up:  Return in about 1 month (around 11/4/2022).     Mayo Howe, TRICAI - CNP

## 2022-10-19 RX ORDER — SENNA PLUS 8.6 MG/1
1 TABLET ORAL 2 TIMES DAILY
Qty: 60 TABLET | Refills: 11 | Status: SHIPPED | OUTPATIENT
Start: 2022-10-19 | End: 2023-10-19

## 2022-10-19 RX ORDER — PANTOPRAZOLE SODIUM 20 MG/1
20 TABLET, DELAYED RELEASE ORAL 2 TIMES DAILY
Qty: 60 TABLET | Refills: 3 | Status: SHIPPED | OUTPATIENT
Start: 2022-10-19

## 2022-10-19 NOTE — TELEPHONE ENCOUNTER
----- Message from Vadim Payne sent at 10/19/2022  9:45 AM EDT -----  Regarding: Med refills  Good morning. I have 2 medications that I need refilled and the [facility] pharmacy said I had to have you, as your name is on the prescription, transfer them to my pharmacy. They are the Senna and Pantoprazole. They were filled in the hospital before I left after surgery. Its the [facility] in [place]. If I need to call the office or do anything else please let me know! Thank you!

## 2022-10-28 ENCOUNTER — OFFICE VISIT (OUTPATIENT)
Dept: CARDIOLOGY CLINIC | Age: 37
End: 2022-10-28
Payer: COMMERCIAL

## 2022-10-28 VITALS
SYSTOLIC BLOOD PRESSURE: 120 MMHG | HEIGHT: 65 IN | WEIGHT: 293 LBS | BODY MASS INDEX: 48.82 KG/M2 | DIASTOLIC BLOOD PRESSURE: 80 MMHG | HEART RATE: 76 BPM

## 2022-10-28 DIAGNOSIS — I27.20 MILD PULMONARY HYPERTENSION (HCC): ICD-10-CM

## 2022-10-28 DIAGNOSIS — G47.33 OSA (OBSTRUCTIVE SLEEP APNEA): ICD-10-CM

## 2022-10-28 DIAGNOSIS — E66.01 CLASS 3 SEVERE OBESITY DUE TO EXCESS CALORIES WITHOUT SERIOUS COMORBIDITY WITH BODY MASS INDEX (BMI) OF 60.0 TO 69.9 IN ADULT (HCC): Primary | ICD-10-CM

## 2022-10-28 DIAGNOSIS — I10 ESSENTIAL HYPERTENSION: ICD-10-CM

## 2022-10-28 PROCEDURE — 99214 OFFICE O/P EST MOD 30 MIN: CPT | Performed by: NURSE PRACTITIONER

## 2022-10-28 PROCEDURE — 3078F DIAST BP <80 MM HG: CPT | Performed by: NURSE PRACTITIONER

## 2022-10-28 PROCEDURE — 3074F SYST BP LT 130 MM HG: CPT | Performed by: NURSE PRACTITIONER

## 2022-10-28 NOTE — PROGRESS NOTES
[facility] PHYSICAL REHABILITATION [facility] [address] [facility],Third Floor  Phone: [phone]    Fax [phone]                  Griffin Lamas MD, Layne Thompson MD, [address] [facility], MD, MD Kike Scherer MD, Cornell Turcios MD, Yonathan Shepherd MD, [address], APRN       Caroline Talamantes, APRN  Naveen Reddy, APRN       Juan Sierra, APRN        Cardiology Progress Note      10/28/2022    RE: Ángela Higginbotham  ([id number])                             Primary cardiologist: Dr. Brittany Dubois      Subjective:  CC:   1. Class 3 severe obesity due to excess calories without serious comorbidity with body mass index (BMI) of 60.0 to 69.9 in adult [facility])    2. Essential hypertension    3. Mild pulmonary hypertension ([address]    4. TENNILLE (obstructive sleep apnea)        HPI: Ángela Higginbotham, who is a  30y.o. year old female with a past medical history as listed below. Patient presents to the office for follow up on obesity, HTN,  TENNILLE, and hyperlipidemia. Patient had gastric surgery 6 weeks prior and has lost 60 lbs. Patient is  an active female who walks regularly. Patient is  compliant with medications. Patient denies any chest pain, shortness of breath, dizziness, syncope, or palpitations. Past Medical History:   Diagnosis Date    H/O Doppler echocardiogram 04/21/2022    EF 55-60%. MIld concentric LV hypertrophy. RVSP 35 mild PHTN. History of exercise stress test 04/21/2022    ECG portion of stress test is negative for ischemia by diagnostic criteria. HTN respnse to exercise and frequent PVCs noted post exercise. Norman score 3.     Hypertension     Mild pulmonary hypertension ([address] 5/3/2022    Sleep apnea        Current Outpatient Medications   Medication Sig Dispense Refill    senna (SENOKOT) 8.6 MG tablet Take 1 tablet by mouth 2 times daily 60 tablet 11    pantoprazole (PROTONIX) 20 MG tablet Take 1 tablet by mouth 2 times daily 60 tablet 3    Ascorbic Acid (VITAMIN C) 250 MG tablet Take 250 mg by mouth daily      Multiple Vitamins-Minerals (THERAPEUTIC MULTIVITAMIN-MINERALS) tablet Take 1 tablet by mouth daily      valsartan-hydroCHLOROthiazide (DIOVAN-HCT) 80-12.5 MG per tablet TAKE 1 TABLET BY MOUTH EVERY DAY 90 tablet 3    CPAP Machine MISC 14 cm by CPAP route nightly      ondansetron (ZOFRAN-ODT) 4 MG disintegrating tablet Take 1 tablet by mouth 3 times daily as needed for Nausea or Vomiting (Patient not taking: No sig reported) 21 tablet 2    Collagen-Vitamin C-Biotin (COLLAGEN 1500/C PO) Take by mouth (Patient not taking: No sig reported)       No current facility-administered medications for this visit. Review of Systems:  Review of Systems   Cardiovascular:  Negative for chest pain, palpitations and leg swelling. Musculoskeletal: Negative. Skin: Negative. Neurological:  Negative for dizziness and weakness. All other systems reviewed and are negative. Objective:      Physical Exam:  /80   Pulse 76   Ht 5' 5\" (1.651 m)   Wt (!) 333 lb (151 kg)   BMI 55.41 kg/m²   Wt Readings from Last 3 Encounters:   10/28/22 (!) 333 lb (151 kg)   10/04/22 (!) 339 lb 3.2 oz (153.9 kg)   09/20/22 (!) 349 lb 8 oz (158.5 kg)     Body mass index is 55.41 kg/m². Physical exam:  Physical Exam  Constitutional:       Appearance: She is well-developed. Cardiovascular:      Rate and Rhythm: Normal rate and regular rhythm. Pulses: Intact distal pulses. Dorsalis pedis pulses are 2+ on the right side and 2+ on the left side. Posterior tibial pulses are 2+ on the right side and 2+ on the left side. Heart sounds: Normal heart sounds, S1 normal and S2 normal.   Pulmonary:      Effort: Pulmonary effort is normal.      Breath sounds: Normal breath sounds. Musculoskeletal:         General: Normal range of motion. Skin:     General: Skin is warm and dry.    Neurological:      Mental Status: She is alert and oriented to person, place, and time. DATA:  No results found for: CKTOTAL, CKMB, CKMBINDEX, TROPONINI  BNP:  No results found for: BNP  PT/INR:  No results found for: PTINR  Lab Results   Component Value Date    LABA1C 5.8 08/23/2022    LABA1C 6.6 (H) 11/25/2021     Lab Results   Component Value Date    CHOL 195 02/21/2022    TRIG 155 (H) 02/21/2022    HDL 38 (L) 02/21/2022    LDLCALC 126 (H) 02/21/2022     Lab Results   Component Value Date    ALT 39 09/13/2022    AST 25 09/13/2022     TSH:  No results found for: TSH    Vitals:    10/28/22 1312   BP: 120/80   Pulse: 76       Echo:4/21/22  Left ventricular systolic function is normal with an ejection fraction of   55-60%. Mild concentric left ventricular hypertrophy. No significant valvular disease noted. Right ventricular systolic pressure of 35 mmHg consistent with mild   pulmonary hypertension. No evidence of pericardial effusion. Stress Test:4/21/22  No ischemia, frequent PVC's       The ASCVD Risk score (Rogerio DK, et al., 2019) failed to calculate for the following reasons: The 2019 ASCVD risk score is only valid for ages 36 to 78      Assessment/ Plan:     Class 3 severe obesity due to excess calories without serious comorbidity with body mass index (BMI) of 60.0 to 69.9 in [facility])   -Discussed the importance of diet and exercise and assisting with weight loss. Patient informed of ideal body weight and high risk mortality associated with obesity. Patient voices understanding. Patient is now down 60 lbs since surgery. Essential hypertension   - Stable, continue with valsartan-hydrochlorothiazide 80-12.5 mg daily    TENNILLE (obstructive sleep apnea)   - Recommend weight loss, continue with admission nightly. Mild pulm hypertension noted on echo. Mild pulmonary hypertension (HCC)   - RSVP 35 mmHg, mild pulmonary hypertension. Recommend weight loss, TENNILLE- CPAP, quit tobacco use, BP control. Patient seen, interviewed and examined.  Testing was reviewed. Patient is encouraged to exercise even a brisk walk for 30 minutes at least 3 to 4 times a week. Lifestyle and risk factor modificatons discussed. Various goals are discussed and questions answered. Continue current medications. Appropriate prescriptions are addressed. Questions answered and patient verbalizes understanding. Call for any problems, questions, or concerns. Pt is to follow up in 6 months for Cardiac management    Electronically signed by Beatriz Gordon.  TRICIA Sinha CNP on 10/28/2022 at 1:51 PM

## 2022-11-01 ENCOUNTER — OFFICE VISIT (OUTPATIENT)
Dept: BARIATRICS/WEIGHT MGMT | Age: 37
End: 2022-11-01
Payer: COMMERCIAL

## 2022-11-01 VITALS
BODY MASS INDEX: 48.82 KG/M2 | HEART RATE: 87 BPM | DIASTOLIC BLOOD PRESSURE: 78 MMHG | WEIGHT: 293 LBS | SYSTOLIC BLOOD PRESSURE: 124 MMHG | HEIGHT: 65 IN | OXYGEN SATURATION: 99 %

## 2022-11-01 DIAGNOSIS — Z98.84 S/P LAPAROSCOPIC SLEEVE GASTRECTOMY: Primary | ICD-10-CM

## 2022-11-01 PROCEDURE — 99213 OFFICE O/P EST LOW 20 MIN: CPT | Performed by: NURSE PRACTITIONER

## 2022-11-01 PROCEDURE — 3078F DIAST BP <80 MM HG: CPT | Performed by: NURSE PRACTITIONER

## 2022-11-01 PROCEDURE — 3074F SYST BP LT 130 MM HG: CPT | Performed by: NURSE PRACTITIONER

## 2022-11-01 ASSESSMENT — ENCOUNTER SYMPTOMS
EYES NEGATIVE: 1
RESPIRATORY NEGATIVE: 1
GASTROINTESTINAL NEGATIVE: 1
ALLERGIC/IMMUNOLOGIC NEGATIVE: 1

## 2022-11-01 ASSESSMENT — PATIENT HEALTH QUESTIONNAIRE - PHQ9
SUM OF ALL RESPONSES TO PHQ QUESTIONS 1-9: 0
2. FEELING DOWN, DEPRESSED OR HOPELESS: 0
1. LITTLE INTEREST OR PLEASURE IN DOING THINGS: 0
SUM OF ALL RESPONSES TO PHQ QUESTIONS 1-9: 0
SUM OF ALL RESPONSES TO PHQ QUESTIONS 1-9: 0
SUM OF ALL RESPONSES TO PHQ9 QUESTIONS 1 & 2: 0
SUM OF ALL RESPONSES TO PHQ QUESTIONS 1-9: 0

## 2022-11-01 NOTE — PROGRESS NOTES
BARIATRIC SURGERY OFFICE PROGRESS NOTE    SUBJECTIVE:    Patient presenting today referred from Avinash Alston, for   Chief Complaint   Patient presents with    Weight Management     3rd PO KAREN SG @ [facility] 09/12/2022. .    Vitals:    11/01/22 1045   BP: 124/78   Pulse: 87   SpO2: 99%        BMI: Body mass index is 54.98 kg/m². Weight History: Wt Readings from Last 3 Encounters:   11/01/22 (!) 330 lb 6.4 oz (149.9 kg)   10/28/22 (!) 333 lb (151 kg)   10/04/22 (!) 339 lb 3.2 oz (153.9 kg)         Mckay Mills is a 30 y.o. female presenting in  bariatric 2 month POST-OP visit. Total weight loss/gain:   -8.8 lbs since last visit  -29.1 lbs since surgery  -84.3 lbs since starting program    Changes in health since last visit: denies    Pt tracking calories: tracking calories; about 600-650; 60-70 gms water daily    Pt exercising: walking and exercises at home    Pt taking vitamins: supplementing    Fluid intake: 60 oz daily    Past Medical History:   Diagnosis Date    H/O Doppler echocardiogram 04/21/2022    EF 55-60%. MIld concentric LV hypertrophy. RVSP 35 mild PHTN. History of exercise stress test 04/21/2022    ECG portion of stress test is negative for ischemia by diagnostic criteria. HTN respnse to exercise and frequent PVCs noted post exercise. Norman score 3.     Hypertension     Mild pulmonary hypertension ([address] 5/3/2022    Sleep apnea         Patient Active Problem List   Diagnosis    Essential hypertension    Patient overweight    Polycystic ovary syndrome    Pneumonia due to COVID-19 virus    TENNILLE (obstructive sleep apnea)    Class 3 severe obesity due to excess calories without serious comorbidity with body mass index (BMI) of 60.0 to 69.9 in adult [facility])    Mild pulmonary hypertension ([address]    Morbid obesity ([address]       Past Surgical History:   Procedure Laterality Date    BARIATRIC SURGERY  09/12/2022    ENDOSCOPY, COLON, DIAGNOSTIC      SLEEVE GASTRECTOMY N/A 9/12/2022    GASTRECTOMY SLEEVE LAPAROSCOPIC ROBOTIC performed by David Castañeda MD at [address] [facility] 06/17/2022    EGD BIOPSY performed by David Castañeda MD at [address] [facility] ENDOSCOPY       Current Outpatient Medications   Medication Sig Dispense Refill    CALCIUM CITRATE, BARIATRIC ADVANTAGE, 500MG LOZENGE Take 1 lozenge by mouth daily      senna (SENOKOT) 8.6 MG tablet Take 1 tablet by mouth 2 times daily 60 tablet 11    pantoprazole (PROTONIX) 20 MG tablet Take 1 tablet by mouth 2 times daily 60 tablet 3    Multiple Vitamins-Minerals (THERAPEUTIC MULTIVITAMIN-MINERALS) tablet Take 1 tablet by mouth daily      valsartan-hydroCHLOROthiazide (DIOVAN-HCT) 80-12.5 MG per tablet TAKE 1 TABLET BY MOUTH EVERY DAY 90 tablet 3    CPAP Machine MISC 14 cm by CPAP route nightly      Ascorbic Acid (VITAMIN C) 250 MG tablet Take 250 mg by mouth daily (Patient not taking: Reported on 11/1/2022)      ondansetron (ZOFRAN-ODT) 4 MG disintegrating tablet Take 1 tablet by mouth 3 times daily as needed for Nausea or Vomiting (Patient not taking: No sig reported) 21 tablet 2    Collagen-Vitamin C-Biotin (COLLAGEN 1500/C PO) Take by mouth (Patient not taking: No sig reported)       No current facility-administered medications for this visit. No Known Allergies      Review of Systems   Constitutional: Negative. HENT: Negative. Eyes: Negative. Respiratory: Negative. Cardiovascular: Negative. Gastrointestinal: Negative. Endocrine: Negative. Genitourinary: Negative. Musculoskeletal: Negative. Skin: Negative. Allergic/Immunologic: Negative. Neurological: Negative. Hematological: Negative. Psychiatric/Behavioral: Negative. OBJECTIVE:    /78 (Site: Left Upper Arm, Position: Sitting, Cuff Size: Medium Adult)   Pulse 87   Ht 5' 5\" (1.651 m)   Wt (!) 330 lb 6.4 oz (149.9 kg)   SpO2 99%   BMI 54.98 kg/m²      Physical Exam  Constitutional:       Appearance: Normal appearance.  She is obese.   HENT:      Head: Normocephalic. Nose: Nose normal.      Mouth/Throat:      Pharynx: Oropharynx is clear. Eyes:      Conjunctiva/sclera: Conjunctivae normal.      Pupils: Pupils are equal, round, and reactive to light. Cardiovascular:      Rate and Rhythm: Normal rate. Pulses: Normal pulses. Pulmonary:      Effort: Pulmonary effort is normal.   Abdominal:      Palpations: Abdomen is soft. Comments: Incisions completely healed   Musculoskeletal:         General: Normal range of motion. Cervical back: Normal range of motion. Skin:     General: Skin is warm and dry. Capillary Refill: Capillary refill takes less than 2 seconds. Neurological:      General: No focal deficit present. Mental Status: She is alert and oriented to person, place, and time. Psychiatric:         Mood and Affect: Mood normal.         Behavior: Behavior normal.       ASSESSMENT & PLAN:    1. S/P laparoscopic sleeve gastrectomy  - Doing well; Down 29.1 pounds since surgery  - Incisions completely healed  - Occasional constipation- continue daily Senna as needed  - Increasing activity as tolerated  - Tolerating regular diet    - Patient was encouraged to journal all food intake. - Keep calorie level at approximately 600-800, per discussion / plan with registered dietician. - Protein intake is to be a minimum of 50-60 grams per day. - Water drinking was encouraged with a goal of 64oz-128oz daily. Beverages are to be calorie free except for milk. Avoid soda. - RTC in one month       As of current visit, regarding obesity-related co-morbid conditions:  TENNILLE [] compliant [] no longer using [] resolved per sleep study; hypertension [] medications; hyperlipidemia [] medications; GERD [] medications; DM [] insulin [] non-insulin [] no meds      The patient expressed understanding and willingness to comply nicely; all questions and concerns addressed.     No orders of the defined types were placed in this encounter. No orders of the defined types were placed in this encounter. Follow Up:  Return in about 1 month (around 12/1/2022).     TRICIA Beltran - CNP

## 2022-11-21 RX ORDER — PANTOPRAZOLE SODIUM 20 MG/1
TABLET, DELAYED RELEASE ORAL
Qty: 60 TABLET | Refills: 3 | Status: SHIPPED | OUTPATIENT
Start: 2022-11-21

## 2022-12-01 ENCOUNTER — OFFICE VISIT (OUTPATIENT)
Dept: BARIATRICS/WEIGHT MGMT | Age: 37
End: 2022-12-01

## 2022-12-01 VITALS
OXYGEN SATURATION: 99 % | SYSTOLIC BLOOD PRESSURE: 136 MMHG | DIASTOLIC BLOOD PRESSURE: 90 MMHG | WEIGHT: 293 LBS | HEIGHT: 65 IN | HEART RATE: 80 BPM | BODY MASS INDEX: 48.82 KG/M2

## 2022-12-01 DIAGNOSIS — Z98.84 S/P LAPAROSCOPIC SLEEVE GASTRECTOMY: Primary | ICD-10-CM

## 2022-12-01 NOTE — PROGRESS NOTES
BARIATRIC SURGERY OFFICE PROGRESS NOTE    SUBJECTIVE:    Patient presenting today referred from Dalila Orozco, for   Chief Complaint   Patient presents with    Weight Management     4th p/o s/g 9/12/22    . Vitals:    12/01/22 1013   BP: (!) 136/90   Pulse: 80   SpO2: 99%        BMI: Body mass index is 52.72 kg/m². Weight History: Wt Readings from Last 3 Encounters:   12/01/22 (!) 316 lb 12.8 oz (143.7 kg)   11/01/22 (!) 330 lb 6.4 oz (149.9 kg)   10/28/22 (!) 333 lb (151 kg)       Qasim Rodriguez is a 30 y.o. female presenting in  bariatric 3 months POST-OP visit. Total weight loss/gain:   -13.6 lbs since last visit  -42.7 lbs since surgery  -97.9 lbs since starting program    Changes in health since last visit: denies    Pt tracking calories: tracking calories about 600 daily; about 60 gms daily    Pt exercising: increasing activity    Pt taking vitamins: supplementing    Fluid intake: 50-60 oz daily    Past Medical History:   Diagnosis Date    H/O Doppler echocardiogram 04/21/2022    EF 55-60%. MIld concentric LV hypertrophy. RVSP 35 mild PHTN. History of exercise stress test 04/21/2022    ECG portion of stress test is negative for ischemia by diagnostic criteria. HTN respnse to exercise and frequent PVCs noted post exercise. Norman score 3.     Hypertension     Mild pulmonary hypertension ([address] 5/3/2022    Sleep apnea         Patient Active Problem List   Diagnosis    Essential hypertension    Patient overweight    Polycystic ovary syndrome    Pneumonia due to COVID-19 virus    TENNILLE (obstructive sleep apnea)    Class 3 severe obesity due to excess calories without serious comorbidity with body mass index (BMI) of 60.0 to 69.9 in adult [facility])    Mild pulmonary hypertension ([address]    Morbid obesity ([address]       Past Surgical History:   Procedure Laterality Date    BARIATRIC SURGERY  09/12/2022    ENDOSCOPY, COLON, DIAGNOSTIC      SLEEVE GASTRECTOMY N/A 9/12/2022    GASTRECTOMY SLEEVE LAPAROSCOPIC ROBOTIC performed by David Castañeda MD at [address] [facility] 06/17/2022    EGD BIOPSY performed by David Castañeda MD at [facility] ENDOSCOPY       Current Outpatient Medications   Medication Sig Dispense Refill    pantoprazole (PROTONIX) 20 MG tablet TAKE 1 TABLET BY MOUTH TWICE A DAY 60 tablet 3    CALCIUM CITRATE, BARIATRIC ADVANTAGE, 500MG LOZENGE Take 1 lozenge by mouth daily      senna (SENOKOT) 8.6 MG tablet Take 1 tablet by mouth 2 times daily 60 tablet 11    Ascorbic Acid (VITAMIN C) 250 MG tablet Take 250 mg by mouth daily (Patient not taking: Reported on 11/1/2022)      Multiple Vitamins-Minerals (THERAPEUTIC MULTIVITAMIN-MINERALS) tablet Take 1 tablet by mouth daily      ondansetron (ZOFRAN-ODT) 4 MG disintegrating tablet Take 1 tablet by mouth 3 times daily as needed for Nausea or Vomiting (Patient not taking: No sig reported) 21 tablet 2    valsartan-hydroCHLOROthiazide (DIOVAN-HCT) 80-12.5 MG per tablet TAKE 1 TABLET BY MOUTH EVERY DAY 90 tablet 3    CPAP Machine MISC 14 cm by CPAP route nightly      Collagen-Vitamin C-Biotin (COLLAGEN 1500/C PO) Take by mouth (Patient not taking: No sig reported)       No current facility-administered medications for this visit. No Known Allergies      Review of Systems   All other systems reviewed and are negative. OBJECTIVE:    BP (!) 136/90   Pulse 80   Ht 5' 5\" (1.651 m)   Wt (!) 316 lb 12.8 oz (143.7 kg)   SpO2 99%   BMI 52.72 kg/m²      Physical Exam  Constitutional:       Appearance: Normal appearance. HENT:      Head: Normocephalic. Nose: Nose normal.      Mouth/Throat:      Pharynx: Oropharynx is clear. Eyes:      Conjunctiva/sclera: Conjunctivae normal.      Pupils: Pupils are equal, round, and reactive to light. Cardiovascular:      Rate and Rhythm: Normal rate. Pulses: Normal pulses. Pulmonary:      Effort: Pulmonary effort is normal.   Abdominal:      Palpations: Abdomen is soft. Musculoskeletal:         General: Normal range of motion. Cervical back: Normal range of motion. Skin:     General: Skin is warm and dry. Capillary Refill: Capillary refill takes less than 2 seconds. Neurological:      General: No focal deficit present. Mental Status: She is alert and oriented to person, place, and time. Psychiatric:         Mood and Affect: Mood normal.         Behavior: Behavior normal.       ASSESSMENT & PLAN:    1. S/P laparoscopic sleeve gastrectomy  - Doing well; Down -42.7 pounds since surgery  - Incisions well healed  - Normal Bm  - Increasing activity as tolerated  - Tolerating regular diet- vomited one time in a month due to over indulging  - Continue bariatric supplements    - Patient was encouraged to journal all food intake. - Keep calorie level at approximately 600-800, per discussion / plan with registered dietician. - Protein intake is to be a minimum of 50-60 grams per day. - Water drinking was encouraged with a goal of 64oz-128oz daily. Beverages are to be calorie free except for milk. Avoid soda. - RTC in one month with lab results       As of current visit, regarding obesity-related co-morbid conditions:  TENNILLE [] compliant [] no longer using [] resolved per sleep study; hypertension [] medications; hyperlipidemia [] medications; GERD [] medications; DM [] insulin [] non-insulin [] no meds      The patient expressed understanding and willingness to comply nicely; all questions and concerns addressed. No orders of the defined types were placed in this encounter.     Orders Placed This Encounter   Procedures    CBC with Auto Differential     Standing Status:   Future     Standing Expiration Date:   12/1/2023    Comprehensive Metabolic Panel     Standing Status:   Future     Standing Expiration Date:   12/1/2023    Hemoglobin A1C     Standing Status:   Future     Standing Expiration Date:   12/1/2023    Iron and TIBC     Standing Status:   Future Standing Expiration Date:   12/1/2023    Vitamin B12 & Folate     Standing Status:   Future     Standing Expiration Date:   12/1/2023         Follow Up:  Return in about 1 month (around 1/1/2023).     Quintin Hussein, TRICIA - CNP

## 2022-12-09 ENCOUNTER — HOSPITAL ENCOUNTER (OUTPATIENT)
Age: 37
Discharge: HOME | End: 2022-12-09
Payer: COMMERCIAL

## 2022-12-09 LAB
ALBUMIN SERPL-MCNC: 4.3 GM/DL (ref 3.4–5)
ALP BLD-CCNC: 86 IU/L (ref 40–129)
ALT SERPL-CCNC: 16 U/L (ref 10–40)
ANION GAP SERPL CALCULATED.3IONS-SCNC: 11 MMOL/L (ref 4–16)
AST SERPL-CCNC: 15 IU/L (ref 15–37)
BASOPHILS ABSOLUTE: 0 K/CU MM
BASOPHILS RELATIVE PERCENT: 0.4 % (ref 0–1)
BILIRUB SERPL-MCNC: 0.6 MG/DL (ref 0–1)
BUN BLDV-MCNC: 22 MG/DL (ref 6–23)
CALCIUM SERPL-MCNC: 9.9 MG/DL (ref 8.3–10.6)
CHLORIDE BLD-SCNC: 104 MMOL/L (ref 99–110)
CO2: 27 MMOL/L (ref 21–32)
CREAT SERPL-MCNC: 0.7 MG/DL (ref 0.6–1.1)
DIFFERENTIAL TYPE: ABNORMAL
EOSINOPHILS ABSOLUTE: 0.1 K/CU MM
EOSINOPHILS RELATIVE PERCENT: 1.2 % (ref 0–3)
ESTIMATED AVERAGE GLUCOSE: 108 MG/DL
FOLATE: 10.3 NG/ML (ref 3.1–17.5)
GFR SERPL CREATININE-BSD FRML MDRD: >60 ML/MIN/1.73M2
GLUCOSE FASTING: 97 MG/DL (ref 70–99)
HBA1C MFR BLD: 5.4 % (ref 4.2–6.3)
HCT VFR BLD CALC: 48.4 % (ref 37–47)
HEMOGLOBIN: 15.7 GM/DL (ref 12.5–16)
IMMATURE NEUTROPHIL %: 0.5 % (ref 0–0.43)
IRON: 105 UG/DL (ref 37–145)
LYMPHOCYTES ABSOLUTE: 4.1 K/CU MM
LYMPHOCYTES RELATIVE PERCENT: 36.7 % (ref 24–44)
MCH RBC QN AUTO: 30.2 PG (ref 27–31)
MCHC RBC AUTO-ENTMCNC: 32.4 % (ref 32–36)
MCV RBC AUTO: 93.1 FL (ref 78–100)
MONOCYTES ABSOLUTE: 0.8 K/CU MM
MONOCYTES RELATIVE PERCENT: 7.3 % (ref 0–4)
PCT TRANSFERRIN: 37 % (ref 10–44)
PDW BLD-RTO: 14.2 % (ref 11.7–14.9)
PLATELET # BLD: 318 K/CU MM (ref 140–440)
PMV BLD AUTO: 9.7 FL (ref 7.5–11.1)
POTASSIUM SERPL-SCNC: 4.2 MMOL/L (ref 3.5–5.1)
RBC # BLD: 5.2 M/CU MM (ref 4.2–5.4)
SEGMENTED NEUTROPHILS ABSOLUTE COUNT: 6 K/CU MM
SEGMENTED NEUTROPHILS RELATIVE PERCENT: 53.9 % (ref 36–66)
SODIUM BLD-SCNC: 142 MMOL/L (ref 135–145)
TOTAL IMMATURE NEUTOROPHIL: 0.06 K/CU MM
TOTAL IRON BINDING CAPACITY: 285 UG/DL (ref 250–450)
TOTAL PROTEIN: 7.1 GM/DL (ref 6.4–8.2)
UNSATURATED IRON BINDING CAPACITY: 180 UG/DL (ref 110–370)
VITAMIN B-12: 618 PG/ML (ref 211–911)
WBC # BLD: 11.1 K/CU MM (ref 4–10.5)

## 2022-12-09 PROCEDURE — 82746 ASSAY OF FOLIC ACID SERUM: CPT

## 2022-12-09 PROCEDURE — 36415 COLL VENOUS BLD VENIPUNCTURE: CPT

## 2022-12-09 PROCEDURE — 85025 COMPLETE CBC W/AUTO DIFF WBC: CPT

## 2022-12-09 PROCEDURE — 83540 ASSAY OF IRON: CPT

## 2022-12-09 PROCEDURE — 82607 VITAMIN B-12: CPT

## 2022-12-09 PROCEDURE — 83550 IRON BINDING TEST: CPT

## 2022-12-09 PROCEDURE — 83036 HEMOGLOBIN GLYCOSYLATED A1C: CPT

## 2022-12-09 PROCEDURE — 80053 COMPREHEN METABOLIC PANEL: CPT

## 2023-01-18 ENCOUNTER — OFFICE VISIT (OUTPATIENT)
Dept: BARIATRICS/WEIGHT MGMT | Age: 38
End: 2023-01-18
Payer: COMMERCIAL

## 2023-01-18 VITALS
WEIGHT: 293 LBS | OXYGEN SATURATION: 99 % | DIASTOLIC BLOOD PRESSURE: 80 MMHG | HEART RATE: 75 BPM | SYSTOLIC BLOOD PRESSURE: 132 MMHG | HEIGHT: 65 IN | BODY MASS INDEX: 48.82 KG/M2

## 2023-01-18 DIAGNOSIS — Z98.84 S/P LAPAROSCOPIC SLEEVE GASTRECTOMY: Primary | ICD-10-CM

## 2023-01-18 PROCEDURE — 3079F DIAST BP 80-89 MM HG: CPT | Performed by: NURSE PRACTITIONER

## 2023-01-18 PROCEDURE — 99213 OFFICE O/P EST LOW 20 MIN: CPT | Performed by: NURSE PRACTITIONER

## 2023-01-18 PROCEDURE — 3075F SYST BP GE 130 - 139MM HG: CPT | Performed by: NURSE PRACTITIONER

## 2023-01-18 ASSESSMENT — PATIENT HEALTH QUESTIONNAIRE - PHQ9
2. FEELING DOWN, DEPRESSED OR HOPELESS: 0
SUM OF ALL RESPONSES TO PHQ QUESTIONS 1-9: 0
1. LITTLE INTEREST OR PLEASURE IN DOING THINGS: 0
SUM OF ALL RESPONSES TO PHQ9 QUESTIONS 1 & 2: 0
SUM OF ALL RESPONSES TO PHQ QUESTIONS 1-9: 0

## 2023-01-18 NOTE — PROGRESS NOTES
BARIATRIC SURGERY OFFICE PROGRESS NOTE    SUBJECTIVE:    Patient presenting today referred from [facility], for   Chief Complaint   Patient presents with    Weight Management     5th PO S/G @ [address] 09/12/2022     . Vitals:    01/18/23 1348   BP: 132/80   Pulse: 75   SpO2: 99%        BMI: Body mass index is 51.65 kg/m². Weight History: Wt Readings from Last 3 Encounters:   01/18/23 (!) 310 lb 6.4 oz (140.8 kg)   12/01/22 (!) 316 lb 12.8 oz (143.7 kg)   11/01/22 (!) 330 lb 6.4 oz (149.9 kg)         Amina Rojo is a 30 y.o. female presenting in  bariatric 4 month POST-OP visit. Total weight loss/gain:   -6.4 lbs since last visit  -49.1 lbs since surgery  -104.3 lbs since starting program    Changes in health since last visit: denies    Pt tracking calories: about 700 calories and 60 gms protein daily    Pt exercising: walking    Pt taking vitamins: chewable MVI    Fluid intake: 4-5 bottles daily    Past Medical History:   Diagnosis Date    H/O Doppler echocardiogram 04/21/2022    EF 55-60%. MIld concentric LV hypertrophy. RVSP 35 mild PHTN. History of exercise stress test 04/21/2022    ECG portion of stress test is negative for ischemia by diagnostic criteria. HTN respnse to exercise and frequent PVCs noted post exercise. Norman score 3.     Hypertension     Mild pulmonary hypertension ([address] 5/3/2022    Sleep apnea         Patient Active Problem List   Diagnosis    Essential hypertension    Patient overweight    Polycystic ovary syndrome    Pneumonia due to COVID-19 virus    TENNILLE (obstructive sleep apnea)    Class 3 severe obesity due to excess calories without serious comorbidity with body mass index (BMI) of 60.0 to 69.9 in adult [facility])    Mild pulmonary hypertension ([address]    Morbid obesity ([address]       Past Surgical History:   Procedure Laterality Date    BARIATRIC SURGERY  09/12/2022    ENDOSCOPY, COLON, DIAGNOSTIC      SLEEVE GASTRECTOMY N/A 9/12/2022    GASTRECTOMY SLEEVE LAPAROSCOPIC ROBOTIC performed by Marci Terrell MD at [address] [place] [address] 06/17/2022    EGD BIOPSY performed by Marci Terrell MD at [facility] ENDOSCOPY       Current Outpatient Medications   Medication Sig Dispense Refill    CALCIUM CITRATE, BARIATRIC ADVANTAGE, 500MG LOZENGE Take 1 lozenge by mouth daily      senna (SENOKOT) 8.6 MG tablet Take 1 tablet by mouth 2 times daily (Patient taking differently: Take 1 tablet by mouth as needed) 60 tablet 11    Multiple Vitamins-Minerals (THERAPEUTIC MULTIVITAMIN-MINERALS) tablet Take 1 tablet by mouth daily      valsartan-hydroCHLOROthiazide (DIOVAN-HCT) 80-12.5 MG per tablet TAKE 1 TABLET BY MOUTH EVERY DAY 90 tablet 3    Collagen-Vitamin C-Biotin (COLLAGEN 1500/C PO) Take by mouth      pantoprazole (PROTONIX) 20 MG tablet TAKE 1 TABLET BY MOUTH TWICE A DAY (Patient not taking: Reported on 1/18/2023) 60 tablet 3    Ascorbic Acid (VITAMIN C) 250 MG tablet Take 250 mg by mouth daily (Patient not taking: No sig reported)      ondansetron (ZOFRAN-ODT) 4 MG disintegrating tablet Take 1 tablet by mouth 3 times daily as needed for Nausea or Vomiting (Patient not taking: No sig reported) 21 tablet 2    CPAP Machine MISC 14 cm by CPAP route nightly (Patient not taking: Reported on 1/18/2023)       No current facility-administered medications for this visit. No Known Allergies      Review of Systems   All other systems reviewed and are negative. OBJECTIVE:    /80 (Site: Left Upper Arm, Position: Sitting, Cuff Size: Medium Adult)   Pulse 75   Ht 5' 5\" (1.651 m)   Wt (!) 310 lb 6.4 oz (140.8 kg)   SpO2 99%   BMI 51.65 kg/m²      Physical Exam  Constitutional:       Appearance: Normal appearance. She is obese. HENT:      Head: Normocephalic. Nose: Nose normal.      Mouth/Throat:      Pharynx: Oropharynx is clear. Eyes:      Conjunctiva/sclera: Conjunctivae normal.      Pupils: Pupils are equal, round, and reactive to light.    Cardiovascular: Rate and Rhythm: Normal rate. Pulses: Normal pulses. Pulmonary:      Effort: Pulmonary effort is normal.   Abdominal:      Palpations: Abdomen is soft. Musculoskeletal:         General: Normal range of motion. Cervical back: Normal range of motion. Skin:     General: Skin is warm and dry. Capillary Refill: Capillary refill takes less than 2 seconds. Neurological:      General: No focal deficit present. Mental Status: She is alert and oriented to person, place, and time. Psychiatric:         Mood and Affect: Mood normal.         Behavior: Behavior normal.       ASSESSMENT & PLAN:    1. S/P laparoscopic sleeve gastrectomy  - Doing well; Down 49.1 pounds since surgery  - Normal Bm  - Increasing activity as tolerated  - Tolerating regular diet  - Denies any nausea or vomiting    - Patient was encouraged to journal all food intake. - Keep calorie level at approximately 600-800, per discussion / plan with registered dietician. - Protein intake is to be a minimum of 50-60 grams per day. - Water drinking was encouraged with a goal of 64oz-128oz daily. Beverages are to be calorie free except for milk. Avoid soda. - Recent Labs reviewed- no concerns  - RTC in 2 months       As of current visit, regarding obesity-related co-morbid conditions:  TENNILLE [] compliant [] no longer using [] resolved per sleep study; hypertension [] medications; hyperlipidemia [] medications; GERD [] medications; DM [] insulin [] non-insulin [] no meds      The patient expressed understanding and willingness to comply nicely; all questions and concerns addressed. No orders of the defined types were placed in this encounter. No orders of the defined types were placed in this encounter. Follow Up:  Return in about 2 months (around 3/18/2023).     TRICIA Leija - CNP

## 2023-03-14 ENCOUNTER — OFFICE VISIT (OUTPATIENT)
Dept: BARIATRICS/WEIGHT MGMT | Age: 38
End: 2023-03-14
Payer: COMMERCIAL

## 2023-03-14 VITALS
DIASTOLIC BLOOD PRESSURE: 74 MMHG | SYSTOLIC BLOOD PRESSURE: 120 MMHG | WEIGHT: 293 LBS | HEIGHT: 65 IN | OXYGEN SATURATION: 97 % | BODY MASS INDEX: 48.82 KG/M2 | HEART RATE: 81 BPM

## 2023-03-14 DIAGNOSIS — Z98.84 S/P LAPAROSCOPIC SLEEVE GASTRECTOMY: Primary | ICD-10-CM

## 2023-03-14 PROCEDURE — 99213 OFFICE O/P EST LOW 20 MIN: CPT | Performed by: NURSE PRACTITIONER

## 2023-03-14 PROCEDURE — 3078F DIAST BP <80 MM HG: CPT | Performed by: NURSE PRACTITIONER

## 2023-03-14 PROCEDURE — 3074F SYST BP LT 130 MM HG: CPT | Performed by: NURSE PRACTITIONER

## 2023-03-29 ENCOUNTER — OFFICE VISIT (OUTPATIENT)
Dept: CARDIOLOGY CLINIC | Age: 38
End: 2023-03-29
Payer: COMMERCIAL

## 2023-03-29 VITALS
DIASTOLIC BLOOD PRESSURE: 78 MMHG | HEART RATE: 80 BPM | SYSTOLIC BLOOD PRESSURE: 120 MMHG | BODY MASS INDEX: 48.82 KG/M2 | HEIGHT: 65 IN | WEIGHT: 293 LBS

## 2023-03-29 DIAGNOSIS — I10 ESSENTIAL HYPERTENSION: ICD-10-CM

## 2023-03-29 DIAGNOSIS — G47.33 OSA (OBSTRUCTIVE SLEEP APNEA): ICD-10-CM

## 2023-03-29 DIAGNOSIS — I27.20 MILD PULMONARY HYPERTENSION (HCC): Primary | ICD-10-CM

## 2023-03-29 DIAGNOSIS — E66.01 CLASS 3 SEVERE OBESITY DUE TO EXCESS CALORIES WITHOUT SERIOUS COMORBIDITY WITH BODY MASS INDEX (BMI) OF 60.0 TO 69.9 IN ADULT (HCC): ICD-10-CM

## 2023-03-29 PROCEDURE — 3074F SYST BP LT 130 MM HG: CPT | Performed by: NURSE PRACTITIONER

## 2023-03-29 PROCEDURE — 3078F DIAST BP <80 MM HG: CPT | Performed by: NURSE PRACTITIONER

## 2023-03-29 PROCEDURE — 99214 OFFICE O/P EST MOD 30 MIN: CPT | Performed by: NURSE PRACTITIONER

## 2023-03-29 NOTE — PROGRESS NOTES
KG ([facility]    [address]  Phone: [phone]    Fax [phone]                  Jenise Bryant MD, Erica Wolff MD, Danella Duverney, MD, MD Adrienne Hong MD, Kalen Valle MD, Lyndsey Hutchinson MD, [address], APRN       Juan R Ryan, APRN  James Holcomb, APRN       Padmini Purcell, APRN        Cardiology Progress Note      3/29/2023    RE: Jocelyn Grant  ([birth year])                             Primary cardiologist: Dr. Purvi Lewis      Subjective:  CC:   1. Mild pulmonary hypertension ([facility]    2. Essential hypertension    3. Class 3 severe obesity due to excess calories without serious comorbidity with body mass index (BMI) of 60.0 to 69.9 in adult ([address]    4. TENNILLE (obstructive sleep apnea)        HPI: Jocelyn Grant, who is a  30y.o. year old female with a past medical history as listed below. Patient presents to the office for follow up on obesity, HTN,  TENNILLE, and hyperlipidemia. Patient had gastric surgery in September of 2022 and has lost over 100 lbs . Patient is  an active female who walks regularly. Patient is  compliant with medications. Patient denies any chest pain, shortness of breath, dizziness, syncope, or palpitations. Past Medical History:   Diagnosis Date    H/O Doppler echocardiogram 04/21/2022    EF 55-60%. MIld concentric LV hypertrophy. RVSP 35 mild PHTN. History of exercise stress test 04/21/2022    ECG portion of stress test is negative for ischemia by diagnostic criteria. HTN respnse to exercise and frequent PVCs noted post exercise. Norman score 3.     Hypertension     Mild pulmonary hypertension ([facility] 5/3/2022    Sleep apnea        Current Outpatient Medications   Medication Sig Dispense Refill    Multiple Vitamins-Minerals (THERAPEUTIC MULTIVITAMIN-MINERALS) tablet Take 1 tablet by mouth daily      valsartan-hydroCHLOROthiazide (DIOVAN-HCT) 80-12.5 MG per tablet TAKE 1

## 2023-05-24 ENCOUNTER — PATIENT MESSAGE (OUTPATIENT)
Dept: BARIATRICS/WEIGHT MGMT | Age: 38
End: 2023-05-24

## 2023-06-08 ENCOUNTER — TELEPHONE (OUTPATIENT)
Dept: SURGERY | Age: 38
End: 2023-06-08

## 2023-06-10 ENCOUNTER — HOSPITAL ENCOUNTER (OUTPATIENT)
Age: 38
Discharge: HOME | End: 2023-06-10
Payer: COMMERCIAL

## 2023-06-10 DIAGNOSIS — Z98.84 S/P LAPAROSCOPIC SLEEVE GASTRECTOMY: ICD-10-CM

## 2023-06-10 LAB
25(OH)D3 SERPL-MCNC: 39.46 NG/ML
ALBUMIN SERPL-MCNC: 4.2 GM/DL (ref 3.4–5)
ALP BLD-CCNC: 75 IU/L (ref 40–129)
ALT SERPL-CCNC: 9 U/L (ref 10–40)
ANION GAP SERPL CALCULATED.3IONS-SCNC: 11 MMOL/L (ref 4–16)
AST SERPL-CCNC: 14 IU/L (ref 15–37)
BASOPHILS ABSOLUTE: 0.1 K/CU MM
BASOPHILS RELATIVE PERCENT: 0.5 % (ref 0–1)
BILIRUB SERPL-MCNC: 0.7 MG/DL (ref 0–1)
BUN SERPL-MCNC: 14 MG/DL (ref 6–23)
CALCIUM SERPL-MCNC: 9.5 MG/DL (ref 8.3–10.6)
CHLORIDE BLD-SCNC: 105 MMOL/L (ref 99–110)
CHOLEST SERPL-MCNC: 205 MG/DL
CO2: 27 MMOL/L (ref 21–32)
CREAT SERPL-MCNC: 0.7 MG/DL (ref 0.6–1.1)
DIFFERENTIAL TYPE: ABNORMAL
EOSINOPHILS ABSOLUTE: 0.1 K/CU MM
EOSINOPHILS RELATIVE PERCENT: 1.2 % (ref 0–3)
ESTIMATED AVERAGE GLUCOSE: 108 MG/DL
FOLATE SERPL-MCNC: >20 NG/ML (ref 3.1–17.5)
GFR SERPL CREATININE-BSD FRML MDRD: >60 ML/MIN/1.73M2
GLUCOSE SERPL-MCNC: 94 MG/DL (ref 70–99)
HBA1C MFR BLD: 5.4 % (ref 4.2–6.3)
HCT VFR BLD CALC: 46.4 % (ref 37–47)
HDLC SERPL-MCNC: 40 MG/DL
HEMOGLOBIN: 14.9 GM/DL (ref 12.5–16)
IMMATURE NEUTROPHIL %: 0.4 % (ref 0–0.43)
IRON: 123 UG/DL (ref 37–145)
LDLC SERPL CALC-MCNC: 132 MG/DL
LYMPHOCYTES ABSOLUTE: 3.1 K/CU MM
LYMPHOCYTES RELATIVE PERCENT: 31.4 % (ref 24–44)
MAGNESIUM: 2.1 MG/DL (ref 1.8–2.4)
MCH RBC QN AUTO: 29.2 PG (ref 27–31)
MCHC RBC AUTO-ENTMCNC: 32.1 % (ref 32–36)
MCV RBC AUTO: 90.8 FL (ref 78–100)
MONOCYTES ABSOLUTE: 0.6 K/CU MM
MONOCYTES RELATIVE PERCENT: 6.3 % (ref 0–4)
PCT TRANSFERRIN: 42 % (ref 10–44)
PDW BLD-RTO: 13.9 % (ref 11.7–14.9)
PLATELET # BLD: 284 K/CU MM (ref 140–440)
PMV BLD AUTO: 9.2 FL (ref 7.5–11.1)
POTASSIUM SERPL-SCNC: 4.5 MMOL/L (ref 3.5–5.1)
RBC # BLD: 5.11 M/CU MM (ref 4.2–5.4)
SEGMENTED NEUTROPHILS ABSOLUTE COUNT: 5.9 K/CU MM
SEGMENTED NEUTROPHILS RELATIVE PERCENT: 60.2 % (ref 36–66)
SODIUM BLD-SCNC: 143 MMOL/L (ref 135–145)
TOTAL IMMATURE NEUTOROPHIL: 0.04 K/CU MM
TOTAL IRON BINDING CAPACITY: 293 UG/DL (ref 250–450)
TOTAL PROTEIN: 7.2 GM/DL (ref 6.4–8.2)
TRIGL SERPL-MCNC: 163 MG/DL
TSH SERPL DL<=0.005 MIU/L-ACNC: 1.59 UIU/ML (ref 0.27–4.2)
UNSATURATED IRON BINDING CAPACITY: 170 UG/DL (ref 110–370)
VITAMIN B-12: 705.6 PG/ML (ref 211–911)
WBC # BLD: 9.8 K/CU MM (ref 4–10.5)

## 2023-06-10 PROCEDURE — 84630 ASSAY OF ZINC: CPT

## 2023-06-10 PROCEDURE — 82306 VITAMIN D 25 HYDROXY: CPT

## 2023-06-10 PROCEDURE — 80053 COMPREHEN METABOLIC PANEL: CPT

## 2023-06-10 PROCEDURE — 85025 COMPLETE CBC W/AUTO DIFF WBC: CPT

## 2023-06-10 PROCEDURE — 83735 ASSAY OF MAGNESIUM: CPT

## 2023-06-10 PROCEDURE — 83036 HEMOGLOBIN GLYCOSYLATED A1C: CPT

## 2023-06-10 PROCEDURE — 83540 ASSAY OF IRON: CPT

## 2023-06-10 PROCEDURE — 80061 LIPID PANEL: CPT

## 2023-06-10 PROCEDURE — 36415 COLL VENOUS BLD VENIPUNCTURE: CPT

## 2023-06-10 PROCEDURE — 84443 ASSAY THYROID STIM HORMONE: CPT

## 2023-06-10 PROCEDURE — 84425 ASSAY OF VITAMIN B-1: CPT

## 2023-06-10 PROCEDURE — 82746 ASSAY OF FOLIC ACID SERUM: CPT

## 2023-06-10 PROCEDURE — 82607 VITAMIN B-12: CPT

## 2023-06-10 PROCEDURE — 83550 IRON BINDING TEST: CPT

## 2023-06-11 LAB — ZINC SERPL-MCNC: 87.2 UG/DL (ref 60–120)

## 2023-08-17 RX ORDER — VALSARTAN AND HYDROCHLOROTHIAZIDE 80; 12.5 MG/1; MG/1
TABLET, FILM COATED ORAL
Qty: 90 TABLET | Refills: 3 | Status: SHIPPED | OUTPATIENT
Start: 2023-08-17

## 2023-09-12 ENCOUNTER — OFFICE VISIT (OUTPATIENT)
Dept: BARIATRICS/WEIGHT MGMT | Age: 38
End: 2023-09-12
Payer: COMMERCIAL

## 2023-09-12 VITALS
HEART RATE: 85 BPM | SYSTOLIC BLOOD PRESSURE: 128 MMHG | DIASTOLIC BLOOD PRESSURE: 76 MMHG | HEIGHT: 65 IN | BODY MASS INDEX: 43.07 KG/M2 | WEIGHT: 258.5 LBS | OXYGEN SATURATION: 97 %

## 2023-09-12 DIAGNOSIS — Z90.3 S/P GASTRIC SLEEVE PROCEDURE: Primary | ICD-10-CM

## 2023-09-12 DIAGNOSIS — I10 ESSENTIAL HYPERTENSION: ICD-10-CM

## 2023-09-12 DIAGNOSIS — E66.01 MORBID OBESITY WITH BMI OF 40.0-44.9, ADULT (HCC): ICD-10-CM

## 2023-09-12 PROCEDURE — 3078F DIAST BP <80 MM HG: CPT | Performed by: SURGERY

## 2023-09-12 PROCEDURE — 3074F SYST BP LT 130 MM HG: CPT | Performed by: SURGERY

## 2023-09-12 PROCEDURE — 99214 OFFICE O/P EST MOD 30 MIN: CPT | Performed by: SURGERY

## 2023-09-12 ASSESSMENT — PATIENT HEALTH QUESTIONNAIRE - PHQ9
SUM OF ALL RESPONSES TO PHQ QUESTIONS 1-9: 0
SUM OF ALL RESPONSES TO PHQ QUESTIONS 1-9: 0
2. FEELING DOWN, DEPRESSED OR HOPELESS: 0
SUM OF ALL RESPONSES TO PHQ QUESTIONS 1-9: 0
1. LITTLE INTEREST OR PLEASURE IN DOING THINGS: 0
SUM OF ALL RESPONSES TO PHQ9 QUESTIONS 1 & 2: 0
SUM OF ALL RESPONSES TO PHQ QUESTIONS 1-9: 0

## 2023-09-27 ENCOUNTER — OFFICE VISIT (OUTPATIENT)
Dept: CARDIOLOGY CLINIC | Age: 38
End: 2023-09-27
Payer: COMMERCIAL

## 2023-09-27 VITALS
DIASTOLIC BLOOD PRESSURE: 66 MMHG | WEIGHT: 255 LBS | HEART RATE: 80 BPM | BODY MASS INDEX: 42.49 KG/M2 | HEIGHT: 65 IN | SYSTOLIC BLOOD PRESSURE: 120 MMHG

## 2023-09-27 DIAGNOSIS — I27.20 MILD PULMONARY HYPERTENSION (HCC): ICD-10-CM

## 2023-09-27 DIAGNOSIS — I10 ESSENTIAL HYPERTENSION: Primary | ICD-10-CM

## 2023-09-27 DIAGNOSIS — E66.01 CLASS 3 SEVERE OBESITY DUE TO EXCESS CALORIES WITHOUT SERIOUS COMORBIDITY WITH BODY MASS INDEX (BMI) OF 60.0 TO 69.9 IN ADULT (HCC): ICD-10-CM

## 2023-09-27 DIAGNOSIS — R06.02 SHORTNESS OF BREATH: ICD-10-CM

## 2023-09-27 PROCEDURE — 3074F SYST BP LT 130 MM HG: CPT | Performed by: INTERNAL MEDICINE

## 2023-09-27 PROCEDURE — 3078F DIAST BP <80 MM HG: CPT | Performed by: INTERNAL MEDICINE

## 2023-09-27 PROCEDURE — 99213 OFFICE O/P EST LOW 20 MIN: CPT | Performed by: INTERNAL MEDICINE

## 2023-12-12 ENCOUNTER — OFFICE VISIT (OUTPATIENT)
Dept: BARIATRICS/WEIGHT MGMT | Age: 38
End: 2023-12-12
Payer: MEDICAID

## 2023-12-12 VITALS — HEIGHT: 65 IN | BODY MASS INDEX: 40.95 KG/M2 | OXYGEN SATURATION: 100 % | WEIGHT: 245.8 LBS

## 2023-12-12 DIAGNOSIS — E66.01 MORBID OBESITY WITH BMI OF 40.0-44.9, ADULT (HCC): Primary | ICD-10-CM

## 2023-12-12 DIAGNOSIS — Z90.3 HISTORY OF SLEEVE GASTRECTOMY: ICD-10-CM

## 2023-12-12 DIAGNOSIS — Z79.899 MEDICATION MANAGEMENT: ICD-10-CM

## 2023-12-12 LAB
ALCOHOL URINE: NORMAL
AMPHETAMINE SCREEN, URINE: NORMAL
BARBITURATE SCREEN, URINE: NORMAL
BENZODIAZEPINE SCREEN, URINE: NORMAL
BUPRENORPHINE URINE: NORMAL
COCAINE METABOLITE SCREEN URINE: NORMAL
CONTROL: NORMAL
FENTANYL SCREEN, URINE: NORMAL
GABAPENTIN SCREEN, URINE: NORMAL
MDMA URINE: NORMAL
METHADONE SCREEN, URINE: NORMAL
METHAMPHETAMINE, URINE: NORMAL
OPIATE SCREEN URINE: NORMAL
OXYCODONE SCREEN URINE: NORMAL
PHENCYCLIDINE SCREEN URINE: NORMAL
PREGNANCY TEST URINE, POC: NORMAL
PROPOXYPHENE SCREEN, URINE: NORMAL
SYNTHETIC CANNABINOIDS(K2) SCREEN, URINE: NORMAL
THC SCREEN, URINE: NORMAL
TRAMADOL SCREEN URINE: NORMAL
TRICYCLIC ANTIDEPRESSANTS, UR: NORMAL

## 2023-12-12 PROCEDURE — 80305 DRUG TEST PRSMV DIR OPT OBS: CPT | Performed by: NURSE PRACTITIONER

## 2023-12-12 PROCEDURE — 81025 URINE PREGNANCY TEST: CPT | Performed by: NURSE PRACTITIONER

## 2023-12-12 PROCEDURE — 99214 OFFICE O/P EST MOD 30 MIN: CPT | Performed by: NURSE PRACTITIONER

## 2023-12-12 RX ORDER — PHENTERMINE HYDROCHLORIDE 37.5 MG/1
37.5 TABLET ORAL
Qty: 30 TABLET | Refills: 0 | Status: SHIPPED | OUTPATIENT
Start: 2023-12-12 | End: 2024-01-11

## 2023-12-13 ENCOUNTER — OFFICE VISIT (OUTPATIENT)
Dept: CARDIOLOGY CLINIC | Age: 38
End: 2023-12-13
Payer: MEDICAID

## 2023-12-13 VITALS
SYSTOLIC BLOOD PRESSURE: 116 MMHG | BODY MASS INDEX: 40.62 KG/M2 | DIASTOLIC BLOOD PRESSURE: 72 MMHG | HEIGHT: 65 IN | HEART RATE: 73 BPM | OXYGEN SATURATION: 98 % | WEIGHT: 243.8 LBS

## 2023-12-13 DIAGNOSIS — I27.20 MILD PULMONARY HYPERTENSION (HCC): ICD-10-CM

## 2023-12-13 DIAGNOSIS — I10 ESSENTIAL HYPERTENSION: Primary | ICD-10-CM

## 2023-12-13 DIAGNOSIS — E66.01 CLASS 3 SEVERE OBESITY DUE TO EXCESS CALORIES WITHOUT SERIOUS COMORBIDITY WITH BODY MASS INDEX (BMI) OF 40.0 TO 44.9 IN ADULT (HCC): ICD-10-CM

## 2023-12-13 PROCEDURE — 3078F DIAST BP <80 MM HG: CPT | Performed by: NURSE PRACTITIONER

## 2023-12-13 PROCEDURE — 99214 OFFICE O/P EST MOD 30 MIN: CPT | Performed by: NURSE PRACTITIONER

## 2023-12-13 PROCEDURE — 3074F SYST BP LT 130 MM HG: CPT | Performed by: NURSE PRACTITIONER

## 2023-12-13 PROCEDURE — 93000 ELECTROCARDIOGRAM COMPLETE: CPT | Performed by: NURSE PRACTITIONER

## 2023-12-13 RX ORDER — HYDROCHLOROTHIAZIDE 25 MG/1
25 TABLET ORAL DAILY
Qty: 30 TABLET | Refills: 2 | Status: SHIPPED | OUTPATIENT
Start: 2023-12-13

## 2023-12-13 NOTE — PROGRESS NOTES
KG ([facility]) [facility] PHYSICAL REHABILITATION [address], [address] [place] [address]  Phone: [phone]    Fax [phone]                  Kaiden Pimentel MD, Chico Galaviz MD, Joaquin William MD, MD Christopher Green MD, Lakisha Hernandez MD, Bladimir Barajas MD, [address] [place] [address] Adrian, TRICIA Stallings, TRICIA Torres        Cardiology Progress Note      12/13/2023    RE: Maddie Martínez  ([birth year])                             Primary cardiologist: Dr. Bard Ortega      Subjective:  CC:   1. Essential hypertension    2. Class 3 severe obesity due to excess calories without serious comorbidity with body mass index (BMI) of 40.0 to 44.9 in adult ([address])    3. Mild pulmonary hypertension (HCC)        HPI: Maddie Martínez, who is a  41y.o. year old female with a past medical history as listed below. Patient presents to the office for follow up on obesity, HTN, and hyperlipidemia. Patient is  an active female who walks regularly. Patient is  compliant with medications. Patient denies any chest pain, shortness of breath, dizziness, syncope, or palpitations. Past Medical History:   Diagnosis Date    H/O Doppler echocardiogram 04/21/2022    EF 55-60%. MIld concentric LV hypertrophy. RVSP 35 mild PHTN. History of exercise stress test 04/21/2022    ECG portion of stress test is negative for ischemia by diagnostic criteria. HTN respnse to exercise and frequent PVCs noted post exercise. Norman score 3. Hypertension     Mild pulmonary hypertension ([address]) 5/3/2022    Sleep apnea        Current Outpatient Medications   Medication Sig Dispense Refill    hydroCHLOROthiazide (HYDRODIURIL) 25 MG tablet Take 1 tablet by mouth daily 30 tablet 2    phentermine (ADIPEX-P) 37.5 MG tablet Take 1 tablet by mouth every morning (before breakfast) for 30 days. BMI 44.  Max Daily Amount: 37.5 mg 30 tablet 0    Multiple Vitamins-Minerals (THERAPEUTIC

## 2023-12-27 ENCOUNTER — NURSE ONLY (OUTPATIENT)
Dept: CARDIOLOGY CLINIC | Age: 38
End: 2023-12-27
Payer: MEDICAID

## 2023-12-27 VITALS
DIASTOLIC BLOOD PRESSURE: 84 MMHG | BODY MASS INDEX: 39.95 KG/M2 | WEIGHT: 239.8 LBS | HEART RATE: 84 BPM | HEIGHT: 65 IN | RESPIRATION RATE: 16 BRPM | SYSTOLIC BLOOD PRESSURE: 134 MMHG | OXYGEN SATURATION: 99 %

## 2023-12-27 DIAGNOSIS — I10 ESSENTIAL HYPERTENSION: Primary | ICD-10-CM

## 2023-12-27 PROCEDURE — 99213 OFFICE O/P EST LOW 20 MIN: CPT | Performed by: NURSE PRACTITIONER

## 2023-12-27 PROCEDURE — 3075F SYST BP GE 130 - 139MM HG: CPT | Performed by: NURSE PRACTITIONER

## 2023-12-27 PROCEDURE — 3079F DIAST BP 80-89 MM HG: CPT | Performed by: NURSE PRACTITIONER

## 2023-12-27 RX ORDER — LEVONORGESTREL 52 MG/1
INTRAUTERINE DEVICE INTRAUTERINE
COMMUNITY
Start: 2023-09-07

## 2023-12-27 NOTE — PROGRESS NOTES
KG ([facility] PHYSICAL REHABILITATION [address], [address] [facility]  Phone: [phone]    Fax [phone]                  Nichole Bauer MD, Epifanio Desai MD, [address] Sayda Muñiz MD, MD Jong Cuadra MD,[facility]    Dayton Lynne MD, Ondina Lagos MD, Jordy Hope, TRICIA Sosa, TRICIA Gaston, TRICIA Borges          HPI:Pt is here for a 2 week BP check. Patient has continued to lose weight through diet and exercise. Recently had Diovan stopped and started on just hydrochlorothiazide. Results:  Vitals:    12/27/23 0850   BP: 134/84   Site: Left Upper Arm   Position: Sitting   Cuff Size: Medium Adult   Pulse: 84   Resp: 16   SpO2: 99%   Weight: 108.8 kg (239 lb 12.8 oz)   Height: 1.651 m (5' 5\")        Wt Readings from Last 3 Encounters:   12/27/23 108.8 kg (239 lb 12.8 oz)   12/13/23 110.6 kg (243 lb 12.8 oz)   12/12/23 111.5 kg (245 lb 12.8 oz)        Assessment/Plan    Hypertension: Plan for pt is to continue with hydrochlorothiazide 25 mg daily. Follow up in 1 month. Pt is to report any dizziness or syncope to the office.         Electronically signed by TRICIA Major CNP on 12/27/2023 at 9:02 AM

## 2024-01-08 RX ORDER — HYDROCHLOROTHIAZIDE 25 MG/1
25 TABLET ORAL DAILY
Qty: 90 TABLET | Refills: 3 | Status: SHIPPED | OUTPATIENT
Start: 2024-01-08

## 2024-01-12 ENCOUNTER — OFFICE VISIT (OUTPATIENT)
Dept: BARIATRICS/WEIGHT MGMT | Age: 39
End: 2024-01-12

## 2024-01-12 VITALS
BODY MASS INDEX: 39.47 KG/M2 | WEIGHT: 236.9 LBS | DIASTOLIC BLOOD PRESSURE: 82 MMHG | OXYGEN SATURATION: 97 % | SYSTOLIC BLOOD PRESSURE: 112 MMHG | HEIGHT: 65 IN | HEART RATE: 70 BPM

## 2024-01-12 DIAGNOSIS — Z79.899 MEDICATION MANAGEMENT: ICD-10-CM

## 2024-01-12 DIAGNOSIS — Z90.3 HISTORY OF SLEEVE GASTRECTOMY: Primary | ICD-10-CM

## 2024-01-12 RX ORDER — SENNOSIDES A AND B 8.6 MG/1
1 TABLET, FILM COATED ORAL 2 TIMES DAILY
Qty: 60 TABLET | Refills: 11 | Status: SHIPPED | OUTPATIENT
Start: 2024-01-12 | End: 2025-01-11

## 2024-01-12 RX ORDER — PHENTERMINE HYDROCHLORIDE 37.5 MG/1
37.5 TABLET ORAL
Qty: 30 TABLET | Refills: 0 | Status: SHIPPED | OUTPATIENT
Start: 2024-01-12 | End: 2024-02-11

## 2024-01-12 ASSESSMENT — ENCOUNTER SYMPTOMS: CONSTIPATION: 1

## 2024-01-12 NOTE — PROGRESS NOTES
RX REFILLED.    -BMI is over 30, or over 27 with weight-related risk factors.     -Patient demonstrated weight loss since last visit.    -Patient aware that weight must be lost at each visit or medication will be discontinued.     - Patient must lose 5% of body weight every 3 months to remain on medication.    -Patient is aware that in order to be successful, must combine Adipex with appropriate diet and exercise plan.     -Pt aware must continue to meet monthly in person while on this medication. F/u in one month.    -Check [place] [facility] Reporting System. Any concerns: NONE Reported       -If elderly or renal impairment, will use lower dose (15 mg daily) and avoid all together if GFR is less than 15. N/A      No orders of the defined types were placed in this encounter.       Orders Placed This Encounter   Medications    senna (SENOKOT) 8.6 MG tablet     Sig: Take 1 tablet by mouth 2 times daily     Dispense:  60 tablet     Refill:  11    phentermine (ADIPEX-P) 37.5 MG tablet     Sig: Take 1 tablet by mouth every morning (before breakfast) for 30 days. BMI 44. Max Daily Amount: 37.5 mg     Dispense:  30 tablet     Refill:  0        Follow Up:  Return in about 1 month (around 2/12/2024).      TRICIA Leon - CNP

## (undated) DEVICE — TOWEL,OR,DSP,ST,BLUE,STD,6/PK,12PK/CS: Brand: MEDLINE

## (undated) DEVICE — GLOVE SURG SZ 65 THK91MIL LTX FREE SYN POLYISOPRENE

## (undated) DEVICE — GOWN,ECLIPSE,POLYRNF,BRTHSLV,L,30/CS: Brand: MEDLINE

## (undated) DEVICE — REDUCER: Brand: ENDOWRIST

## (undated) DEVICE — CANNULA SEAL

## (undated) DEVICE — ADHESIVE SKIN CLSR 0.7ML TOP DERMBND ADV

## (undated) DEVICE — ENDOSCOPY KIT: Brand: MEDLINE INDUSTRIES, INC.

## (undated) DEVICE — BLADELESS OBTURATOR: Brand: WECK VISTA

## (undated) DEVICE — SET TBNG DISP TIP FOR AHTO

## (undated) DEVICE — GLOVE SURG SZ 75 L12IN FNGR THK79MIL GRN LTX FREE

## (undated) DEVICE — LAPAROSCOPIC TROCAR SLEEVE/SINGLE USE: Brand: KII® OPTICAL ACCESS SYSTEM

## (undated) DEVICE — EXCEL 10FT (3.05 M) INSUFFLATION TUBING SET WITH 0.1 MICRON FILTER: Brand: EXCEL

## (undated) DEVICE — SUREFORM 45: Brand: SUREFORM

## (undated) DEVICE — GOWN,SIRUS,POLYRNF,BRTHSLV,XLN/XL,20/CS: Brand: MEDLINE

## (undated) DEVICE — SYRINGE MED 20ML STD CLR PLAS LUERLOCK TIP N CTRL DISP

## (undated) DEVICE — GLOVE SURG SZ 7 L12IN FNGR THK79MIL GRN LTX FREE

## (undated) DEVICE — GLOVE ORANGE PI 7 1/2   MSG9075

## (undated) DEVICE — SUTURE SZ 0 27IN 5/8 CIR UR-6  TAPER PT VIOLET ABSRB VICRYL J603H

## (undated) DEVICE — NEEDLE HYPO 20GA L1.5IN YEL POLYPR HUB S STL REG BVL STR

## (undated) DEVICE — SUTURE VCRL SZ 4-0 L18IN ABSRB UD L19MM PS-2 3/8 CIR PRIM J496H

## (undated) DEVICE — LARGE SUTURE CUT NEEDLE DRIVER: Brand: ENDOWRIST

## (undated) DEVICE — STAPLER 60 RELOAD GREEN: Brand: SUREFORM

## (undated) DEVICE — STAPLER 60 RELOAD BLUE: Brand: SUREFORM

## (undated) DEVICE — VESSEL SEALER EXTEND: Brand: ENDOWRIST

## (undated) DEVICE — STAPLER 60: Brand: SUREFORM

## (undated) DEVICE — ARM DRAPE

## (undated) DEVICE — SYRINGE 20ML LL S/C 50

## (undated) DEVICE — Device

## (undated) DEVICE — CADIERE FORCEPS: Brand: ENDOWRIST

## (undated) DEVICE — SUTURE STRATAFIX SPRL PDS + VLT 3-0 15CM DISPOSABLE/SNGLE SXPP1B420

## (undated) DEVICE — COLUMN DRAPE

## (undated) DEVICE — GLOVE ORANGE PI 7   MSG9070

## (undated) DEVICE — SEAL

## (undated) DEVICE — Z DISCONTINUED (USE MFG CAT MVABO)  TUBING GAS SAMPLING STD 6.5 FT FEMALE CONN SMRT CAPNOLINE

## (undated) DEVICE — FORCEPS BX L240CM JAW DIA2.8MM L CAP W/ NDL MIC MESH TOOTH